# Patient Record
Sex: MALE | Race: WHITE | NOT HISPANIC OR LATINO | URBAN - METROPOLITAN AREA
[De-identification: names, ages, dates, MRNs, and addresses within clinical notes are randomized per-mention and may not be internally consistent; named-entity substitution may affect disease eponyms.]

---

## 2024-07-07 ENCOUNTER — INPATIENT (INPATIENT)
Facility: HOSPITAL | Age: 64
LOS: 8 days | Discharge: HOME | End: 2024-07-16
Attending: INTERNAL MEDICINE | Admitting: INTERNAL MEDICINE
Payer: MEDICAID

## 2024-07-07 VITALS
TEMPERATURE: 98 F | HEART RATE: 60 BPM | WEIGHT: 164.91 LBS | SYSTOLIC BLOOD PRESSURE: 150 MMHG | OXYGEN SATURATION: 100 % | HEIGHT: 76 IN | RESPIRATION RATE: 16 BRPM | DIASTOLIC BLOOD PRESSURE: 86 MMHG

## 2024-07-07 DIAGNOSIS — E87.5 HYPERKALEMIA: ICD-10-CM

## 2024-07-07 DIAGNOSIS — Z29.9 ENCOUNTER FOR PROPHYLACTIC MEASURES, UNSPECIFIED: ICD-10-CM

## 2024-07-07 DIAGNOSIS — N13.8 OTHER OBSTRUCTIVE AND REFLUX UROPATHY: ICD-10-CM

## 2024-07-07 DIAGNOSIS — J32.9 CHRONIC SINUSITIS, UNSPECIFIED: ICD-10-CM

## 2024-07-07 DIAGNOSIS — N17.9 ACUTE KIDNEY FAILURE, UNSPECIFIED: ICD-10-CM

## 2024-07-07 DIAGNOSIS — R51.9 HEADACHE, UNSPECIFIED: ICD-10-CM

## 2024-07-07 DIAGNOSIS — R03.0 ELEVATED BLOOD-PRESSURE READING, WITHOUT DIAGNOSIS OF HYPERTENSION: ICD-10-CM

## 2024-07-07 LAB
ALBUMIN SERPL ELPH-MCNC: 4.7 G/DL — SIGNIFICANT CHANGE UP (ref 3.3–5)
ALP SERPL-CCNC: 56 U/L — SIGNIFICANT CHANGE UP (ref 40–120)
ALT FLD-CCNC: 7 U/L — SIGNIFICANT CHANGE UP (ref 4–41)
ANION GAP SERPL CALC-SCNC: 16 MMOL/L — HIGH (ref 7–14)
ANION GAP SERPL CALC-SCNC: 18 MMOL/L — HIGH (ref 7–14)
ANION GAP SERPL CALC-SCNC: 18 MMOL/L — HIGH (ref 7–14)
APPEARANCE UR: CLEAR — SIGNIFICANT CHANGE UP
AST SERPL-CCNC: <5 U/L — LOW (ref 4–40)
BACTERIA # UR AUTO: NEGATIVE /HPF — SIGNIFICANT CHANGE UP
BASOPHILS # BLD AUTO: 0.01 K/UL — SIGNIFICANT CHANGE UP (ref 0–0.2)
BASOPHILS NFR BLD AUTO: 0.2 % — SIGNIFICANT CHANGE UP (ref 0–2)
BILIRUB SERPL-MCNC: 0.3 MG/DL — SIGNIFICANT CHANGE UP (ref 0.2–1.2)
BILIRUB UR-MCNC: NEGATIVE — SIGNIFICANT CHANGE UP
BLOOD GAS VENOUS COMPREHENSIVE RESULT: SIGNIFICANT CHANGE UP
BUN SERPL-MCNC: 74 MG/DL — HIGH (ref 7–23)
BUN SERPL-MCNC: 76 MG/DL — HIGH (ref 7–23)
BUN SERPL-MCNC: 79 MG/DL — HIGH (ref 7–23)
CALCIUM SERPL-MCNC: 8.9 MG/DL — SIGNIFICANT CHANGE UP (ref 8.4–10.5)
CALCIUM SERPL-MCNC: 9 MG/DL — SIGNIFICANT CHANGE UP (ref 8.4–10.5)
CALCIUM SERPL-MCNC: 9.1 MG/DL — SIGNIFICANT CHANGE UP (ref 8.4–10.5)
CAST: 0 /LPF — SIGNIFICANT CHANGE UP (ref 0–4)
CHLORIDE SERPL-SCNC: 106 MMOL/L — SIGNIFICANT CHANGE UP (ref 98–107)
CHLORIDE SERPL-SCNC: 106 MMOL/L — SIGNIFICANT CHANGE UP (ref 98–107)
CHLORIDE SERPL-SCNC: 107 MMOL/L — SIGNIFICANT CHANGE UP (ref 98–107)
CO2 SERPL-SCNC: 14 MMOL/L — LOW (ref 22–31)
CO2 SERPL-SCNC: 15 MMOL/L — LOW (ref 22–31)
CO2 SERPL-SCNC: 17 MMOL/L — LOW (ref 22–31)
COLOR SPEC: YELLOW — SIGNIFICANT CHANGE UP
CREAT SERPL-MCNC: 4.94 MG/DL — HIGH (ref 0.5–1.3)
CREAT SERPL-MCNC: 5.14 MG/DL — HIGH (ref 0.5–1.3)
CREAT SERPL-MCNC: 5.25 MG/DL — HIGH (ref 0.5–1.3)
DIFF PNL FLD: NEGATIVE — SIGNIFICANT CHANGE UP
EGFR: 11 ML/MIN/1.73M2 — LOW
EGFR: 12 ML/MIN/1.73M2 — LOW
EGFR: 12 ML/MIN/1.73M2 — LOW
EOSINOPHIL # BLD AUTO: 0.11 K/UL — SIGNIFICANT CHANGE UP (ref 0–0.5)
EOSINOPHIL NFR BLD AUTO: 2.1 % — SIGNIFICANT CHANGE UP (ref 0–6)
GLUCOSE SERPL-MCNC: 107 MG/DL — HIGH (ref 70–99)
GLUCOSE SERPL-MCNC: 78 MG/DL — SIGNIFICANT CHANGE UP (ref 70–99)
GLUCOSE SERPL-MCNC: 83 MG/DL — SIGNIFICANT CHANGE UP (ref 70–99)
GLUCOSE UR QL: NEGATIVE MG/DL — SIGNIFICANT CHANGE UP
HCT VFR BLD CALC: 28.6 % — LOW (ref 39–50)
HGB BLD-MCNC: 9.1 G/DL — LOW (ref 13–17)
IANC: 3.68 K/UL — SIGNIFICANT CHANGE UP (ref 1.8–7.4)
IMM GRANULOCYTES NFR BLD AUTO: 0.4 % — SIGNIFICANT CHANGE UP (ref 0–0.9)
KETONES UR-MCNC: NEGATIVE MG/DL — SIGNIFICANT CHANGE UP
LEUKOCYTE ESTERASE UR-ACNC: ABNORMAL
LYMPHOCYTES # BLD AUTO: 0.93 K/UL — LOW (ref 1–3.3)
LYMPHOCYTES # BLD AUTO: 17.9 % — SIGNIFICANT CHANGE UP (ref 13–44)
MCHC RBC-ENTMCNC: 31 PG — SIGNIFICANT CHANGE UP (ref 27–34)
MCHC RBC-ENTMCNC: 31.8 GM/DL — LOW (ref 32–36)
MCV RBC AUTO: 97.3 FL — SIGNIFICANT CHANGE UP (ref 80–100)
MONOCYTES # BLD AUTO: 0.46 K/UL — SIGNIFICANT CHANGE UP (ref 0–0.9)
MONOCYTES NFR BLD AUTO: 8.8 % — SIGNIFICANT CHANGE UP (ref 2–14)
NEUTROPHILS # BLD AUTO: 3.68 K/UL — SIGNIFICANT CHANGE UP (ref 1.8–7.4)
NEUTROPHILS NFR BLD AUTO: 70.6 % — SIGNIFICANT CHANGE UP (ref 43–77)
NITRITE UR-MCNC: NEGATIVE — SIGNIFICANT CHANGE UP
NRBC # BLD: 0 /100 WBCS — SIGNIFICANT CHANGE UP (ref 0–0)
NRBC # FLD: 0.02 K/UL — HIGH (ref 0–0)
PH UR: 6 — SIGNIFICANT CHANGE UP (ref 5–8)
PLATELET # BLD AUTO: 152 K/UL — SIGNIFICANT CHANGE UP (ref 150–400)
POTASSIUM SERPL-MCNC: 5.5 MMOL/L — HIGH (ref 3.5–5.3)
POTASSIUM SERPL-MCNC: 5.5 MMOL/L — HIGH (ref 3.5–5.3)
POTASSIUM SERPL-MCNC: 6.2 MMOL/L — CRITICAL HIGH (ref 3.5–5.3)
POTASSIUM SERPL-SCNC: 5.5 MMOL/L — HIGH (ref 3.5–5.3)
POTASSIUM SERPL-SCNC: 5.5 MMOL/L — HIGH (ref 3.5–5.3)
POTASSIUM SERPL-SCNC: 6.2 MMOL/L — CRITICAL HIGH (ref 3.5–5.3)
PROT SERPL-MCNC: 7.5 G/DL — SIGNIFICANT CHANGE UP (ref 6–8.3)
PROT UR-MCNC: 100 MG/DL
RBC # BLD: 2.94 M/UL — LOW (ref 4.2–5.8)
RBC # FLD: 13.6 % — SIGNIFICANT CHANGE UP (ref 10.3–14.5)
RBC CASTS # UR COMP ASSIST: 0 /HPF — SIGNIFICANT CHANGE UP (ref 0–4)
SODIUM SERPL-SCNC: 137 MMOL/L — SIGNIFICANT CHANGE UP (ref 135–145)
SODIUM SERPL-SCNC: 139 MMOL/L — SIGNIFICANT CHANGE UP (ref 135–145)
SODIUM SERPL-SCNC: 141 MMOL/L — SIGNIFICANT CHANGE UP (ref 135–145)
SP GR SPEC: 1.01 — SIGNIFICANT CHANGE UP (ref 1–1.03)
SQUAMOUS # UR AUTO: 0 /HPF — SIGNIFICANT CHANGE UP (ref 0–5)
UROBILINOGEN FLD QL: 0.2 MG/DL — SIGNIFICANT CHANGE UP (ref 0.2–1)
WBC # BLD: 5.21 K/UL — SIGNIFICANT CHANGE UP (ref 3.8–10.5)
WBC # FLD AUTO: 5.21 K/UL — SIGNIFICANT CHANGE UP (ref 3.8–10.5)
WBC UR QL: 5 /HPF — SIGNIFICANT CHANGE UP (ref 0–5)

## 2024-07-07 PROCEDURE — 99223 1ST HOSP IP/OBS HIGH 75: CPT

## 2024-07-07 PROCEDURE — 99291 CRITICAL CARE FIRST HOUR: CPT

## 2024-07-07 PROCEDURE — 71250 CT THORAX DX C-: CPT | Mod: 26,MC

## 2024-07-07 PROCEDURE — 99255 IP/OBS CONSLTJ NEW/EST HI 80: CPT

## 2024-07-07 PROCEDURE — 70450 CT HEAD/BRAIN W/O DYE: CPT | Mod: 26,MC

## 2024-07-07 PROCEDURE — 74176 CT ABD & PELVIS W/O CONTRAST: CPT | Mod: 26,MC

## 2024-07-07 RX ORDER — TAMSULOSIN HYDROCHLORIDE 0.4 MG/1
0.4 CAPSULE ORAL AT BEDTIME
Refills: 0 | Status: DISCONTINUED | OUTPATIENT
Start: 2024-07-07 | End: 2024-07-16

## 2024-07-07 RX ORDER — ACETAMINOPHEN 325 MG
650 TABLET ORAL EVERY 6 HOURS
Refills: 0 | Status: DISCONTINUED | OUTPATIENT
Start: 2024-07-07 | End: 2024-07-16

## 2024-07-07 RX ORDER — LIDOCAINE HYDROCHLORIDE 20 MG/ML
5 INJECTION, SOLUTION EPIDURAL; INFILTRATION; INTRACAUDAL; PERINEURAL ONCE
Refills: 0 | Status: COMPLETED | OUTPATIENT
Start: 2024-07-07 | End: 2024-07-07

## 2024-07-07 RX ORDER — SODIUM CHLORIDE 0.9 % (FLUSH) 0.9 %
1500 SYRINGE (ML) INJECTION ONCE
Refills: 0 | Status: COMPLETED | OUTPATIENT
Start: 2024-07-07 | End: 2024-07-07

## 2024-07-07 RX ORDER — DEXTROSE MONOHYDRATE AND SODIUM CHLORIDE 5; .3 G/100ML; G/100ML
1000 INJECTION, SOLUTION INTRAVENOUS
Refills: 0 | Status: DISCONTINUED | OUTPATIENT
Start: 2024-07-07 | End: 2024-07-08

## 2024-07-07 RX ORDER — SODIUM CHLORIDE 0.65 %
1 AEROSOL, SPRAY (ML) NASAL
Refills: 0 | Status: DISCONTINUED | OUTPATIENT
Start: 2024-07-07 | End: 2024-07-16

## 2024-07-07 RX ORDER — SODIUM ZIRCONIUM CYCLOSILICATE 10 G/10G
10 POWDER, FOR SUSPENSION ORAL ONCE
Refills: 0 | Status: COMPLETED | OUTPATIENT
Start: 2024-07-07 | End: 2024-07-07

## 2024-07-07 RX ORDER — MAGNESIUM, ALUMINUM HYDROXIDE 400-400
30 TABLET,CHEWABLE ORAL EVERY 4 HOURS
Refills: 0 | Status: DISCONTINUED | OUTPATIENT
Start: 2024-07-07 | End: 2024-07-16

## 2024-07-07 RX ORDER — ONDANSETRON HYDROCHLORIDE 2 MG/ML
4 INJECTION INTRAMUSCULAR; INTRAVENOUS EVERY 8 HOURS
Refills: 0 | Status: DISCONTINUED | OUTPATIENT
Start: 2024-07-07 | End: 2024-07-16

## 2024-07-07 RX ORDER — CALCIUM GLUCONATE 98 MG/ML
1 INJECTION, SOLUTION INTRAVENOUS ONCE
Refills: 0 | Status: COMPLETED | OUTPATIENT
Start: 2024-07-07 | End: 2024-07-07

## 2024-07-07 RX ADMIN — SODIUM ZIRCONIUM CYCLOSILICATE 10 GRAM(S): 10 POWDER, FOR SUSPENSION ORAL at 11:23

## 2024-07-07 RX ADMIN — LIDOCAINE HYDROCHLORIDE 5 MILLILITER(S): 20 INJECTION, SOLUTION EPIDURAL; INFILTRATION; INTRACAUDAL; PERINEURAL at 09:57

## 2024-07-07 RX ADMIN — SODIUM ZIRCONIUM CYCLOSILICATE 10 GRAM(S): 10 POWDER, FOR SUSPENSION ORAL at 21:57

## 2024-07-07 RX ADMIN — Medication 1500 MILLILITER(S): at 11:33

## 2024-07-07 RX ADMIN — LIDOCAINE HYDROCHLORIDE 5 MILLILITER(S): 20 INJECTION, SOLUTION EPIDURAL; INFILTRATION; INTRACAUDAL; PERINEURAL at 13:32

## 2024-07-07 RX ADMIN — TAMSULOSIN HYDROCHLORIDE 0.4 MILLIGRAM(S): 0.4 CAPSULE ORAL at 22:02

## 2024-07-07 RX ADMIN — CALCIUM GLUCONATE 100 GRAM(S): 98 INJECTION, SOLUTION INTRAVENOUS at 11:23

## 2024-07-07 RX ADMIN — DEXTROSE MONOHYDRATE AND SODIUM CHLORIDE 75 MILLILITER(S): 5; .3 INJECTION, SOLUTION INTRAVENOUS at 23:07

## 2024-07-07 NOTE — H&P ADULT - PROBLEM SELECTOR PLAN 2
-pt noted with likely chronic b/l hydronephrosis and high PVR  -2/2 enlarged prostate and possible bladder mass  -silva placed however as per CT scan tip of the Silva catheter projects beyond the left posterolateral bladder wall margin, possibly within a collapsed diverticulum or external to the bladder  -appreciate urology recs. Monitor UOP. Started on IVF for post obstructive diuresis. Monitor urine color, hand irrigate as needed  -CT cystogram to assess for bladder integrity, r/o perforation   -will need outpt cysto to investigate bladder for mucosal anomalies and eval prostate  -start on flomax and proscar   -will likely need to continue silva on d/c as per urology   - Maintain strict intake and output   - Repeat BMP q6 hours and replete electrolytes as needed until UOP normalizes  -Minimize medications with anticholinergic, antihistaminergic, opioid and benzodiazapine properties at tolerated, maximize mobility and minimized constipation with good bowel regimen.

## 2024-07-07 NOTE — CONSULT NOTE ADULT - NS ATTEST RISK PROBLEM GEN_ALL_CORE FT
urinary retention with renal dysfunction and potential long term consequent bladder/ renal compromise

## 2024-07-07 NOTE — ED PROVIDER NOTE - OBJECTIVE STATEMENT
Patient is a 63-year-old male with no pertinent past medical history presents with urinary incontinence x 2 months.  Patient states he has had episodes of nocturnal urinary incontinence when falling asleep intermittently for the past 2 months.  Patient also reports intermittent mild discomfort at left lower quadrant.  He states 1 month ago, he had ultrasound performed which showed "a lobulated solid mass in the posterior wall of the urinary bladder...raising possibility of a neoplastic polyp.  Urinary bladder is markedly dilated with irregular wall thickening likely due to chronic cystitis.  PVR large.  Severe bilateral hydronephrosis.  Severe bilateral hydroureter.  Prostate is not grossly enlarged.  Urinary bladder obstruction considered."  Patient also brings copies of lab work from 6/7/2024 showing a BUN of 62 and a creatinine of 3.85.  Patient also reports intermittent episodes of difficulty starting urinary stream.  Patient also reports mild generalized headaches described as discomfort for the past 2 to 3 months.  He states no active headache at this time.  Patient denies any fevers, chills, changes in vision or hearing, numbness, weakness, dizziness, chest pain, shortness of breath, dysuria, cloudy urine, hematuria, flank pain, testicular pain.

## 2024-07-07 NOTE — H&P ADULT - PROBLEM SELECTOR PLAN 4
-noted with SBP 180s, unclear if related to urinary retention or pain however pt also with subconjunctival hemorrhage of left eye  -monitor BP closely and will need to start medications if continues to be elevated

## 2024-07-07 NOTE — ED ADULT NURSE NOTE - NSFALLUNIVINTERV_ED_ALL_ED
Bed/Stretcher in lowest position, wheels locked, appropriate side rails in place/Call bell, personal items and telephone in reach/Instruct patient to call for assistance before getting out of bed/chair/stretcher/Non-slip footwear applied when patient is off stretcher/[place] to call system/Physically safe environment - no spills, clutter or unnecessary equipment/Purposeful proactive rounding/Room/bathroom lighting operational, light cord in reach

## 2024-07-07 NOTE — H&P ADULT - ASSESSMENT
63-year-old male with no pertinent past medical history presents with urinary incontinence x 2 months and poor urinary stream w/ suprapubic pain found w/ YG on suspected CKD 2/2 obstructive nephropathy 2/2 suspected bladder mass

## 2024-07-07 NOTE — ED PROVIDER NOTE - CRITICAL CARE ATTENDING CONTRIBUTION TO CARE
Patient is a 63-year-old male with no pertinent past medical history presents with urinary incontinence x 2 months.  Patient states he has had episodes of nocturnal urinary incontinence when falling asleep intermittently for the past 2 months.  Patient also reports intermittent mild discomfort at left lower quadrant.  He states 1 month ago, he had ultrasound performed which showed "a lobulated solid mass in the posterior wall of the urinary bladder...raising possibility of a neoplastic polyp.  Urinary bladder is markedly dilated with irregular wall thickening likely due to chronic cystitis.  PVR large.  Severe bilateral hydronephrosis.  Severe bilateral hydroureter.  Prostate is not grossly enlarged.  Urinary bladder obstruction considered."  Patient also brings copies of lab work from 6/7/2024 showing a BUN of 62 and a creatinine of 3.85.  Patient also reports intermittent episodes of difficulty starting urinary stream.  Patient also reports mild generalized headaches described as discomfort for the past 2 to 3 months.  He states no active headache at this time.  Patient denies any fevers, chills, changes in vision or hearing, numbness, weakness, dizziness, chest pain, shortness of breath, dysuria, cloudy urine, hematuria, flank pain, testicular pain.    I performed a face to face evaluation of this patient and obtained a history and performed a full exam.  I agree with the history, physical exam and plan of the PA.   pt with elevated K, requiring intervention including Ca  for mild hyperacute t's. pt well appearing otherwise, hd stable.

## 2024-07-07 NOTE — H&P ADULT - PROBLEM SELECTOR PLAN 1
-Pt p/w Cr 5.25. Cr one month ago was 3.85. Also noted with hyperkalemia and metabolic acidosis 2/2 YG. In the setting of b/l hydronephrosis and distended bladder c/f obstructive process  -s/p silva placement in ED with 5.6L outpt since presentation. Bloody urine but no clots noted  -start on IVF given post obstructive diuresis, encourage pt to drink to thirst   -treat mild hyperkalemia w/ lokelma  -trend BMP q6h for now. F/u repeat VBG  -monitor Is and Os -Pt p/w Cr 5.25. Cr one month ago was 3.85. Also noted with hyperkalemia and metabolic acidosis 2/2 YG. In the setting of b/l hydronephrosis and distended bladder c/f obstructive process  -s/p silva placement in ED with 5.6L outpt since presentation. Bloody urine but no clots noted. Hematuria thought to be 2/2 decompression  -start on IVF given post obstructive diuresis, encourage pt to drink to thirst   -treat mild hyperkalemia w/ lokelma  -trend BMP q6h for now. F/u repeat VBG  -monitor Is and Os

## 2024-07-07 NOTE — CONSULT NOTE ADULT - ATTENDING COMMENTS
NAD   ncat abd nt soft    silva in place cranberry urine.    pt with high pvr, YG, cath placed with 3L drainage maddison chronic appearing hydro.    post cath ct with read suggesting possible extravesical location of cath tip.  suspect in fold of thinned decompressed bladder.  CT cystogram to assess for bladder integrity     will need outpt cysto to investigate bladder for mucosal anomalies and eval prostate.  uroselective alpha blocker if there are no contraindications. Minimize medications with anticholinergic, antihistaminergic, opioid and benzodiazapine properties at tolerated, maximize mobility and minimized constipation with good bowel regimen.    cont cath at discharge  anticipate decompression hematuria that should resolve over next few days may hand irrigate if needed.

## 2024-07-07 NOTE — H&P ADULT - HISTORY OF PRESENT ILLNESS
63-year-old male with no pertinent past medical history presents with urinary incontinence x 2 months and poor urinary stream w/ suprapubic pain. Patient states he has had episodes of nocturnal urinary incontinence when falling asleep intermittently for the past 2 months.  Patient also reports intermittent mild discomfort at left lower quadrant/suprapubic area.  He states 1 month ago, he had ultrasound performed which showed "a lobulated solid mass in the posterior wall of the urinary bladder...raising possibility of a neoplastic polyp.  Urinary bladder is markedly dilated with irregular wall thickening likely due to chronic cystitis.  PVR large.  Severe bilateral hydronephrosis.  Severe bilateral hydroureter.  Prostate is not grossly enlarged.  Urinary bladder obstruction considered."  Patient also brings copies of lab work from 6/7/2024 showing a BUN of 62 and a creatinine of 3.85.  Patient also reports intermittent episodes of difficulty starting urinary stream, has small volume urine output.  Patient also notes that over the last few weeks had had mild generalized headaches described as discomfort without associated visual changes or focal weakness. He also notes feeling malaise and nauseous, poor appetite   He states after placement of silva, he has resolution of headache and generalized malaise. Still noting some LLQ discomfort. Patient denies any fevers, chills, changes in vision or hearing, numbness, focal weakness, dizziness, chest pain, shortness of breath, dysuria, cloudy urine, hematuria, flank pain, testicular pain. He denies any facial pain, purulent nasal discharge or nasal congestion.  Notes blood shot left eye for the last few days, denies trauma, denies any change in visual acuity or eye pain. States he was evaluated by an opthalmologist in the ER and was told everything looked fine.

## 2024-07-07 NOTE — H&P ADULT - NSHPPHYSICALEXAM_GEN_ALL_CORE
GENERAL APPEARANCE: Well developed, alert and cooperative. NAD.   HEENT:  PERRL, EOMI. Subconjunctival hemorrhage of left eye. External auditory canals normal, hearing grossly intact. No facial pain on palpation.   NECK: Neck supple, non-tender   CARDIAC: Normal S1 and S2. No S3, S4 or murmurs. Rhythm is regular.  LUNGS: Clear to auscultation and percussion without rales, rhonchi, wheezing or diminished breath sounds.  ABDOMEN: Positive bowel sounds. Soft, nondistended, mild LLQ tenderness. No guarding or rebound.   : silva in place draining cranberry red urine  MUSCULOSKELETAL: ROM intact spine and extremities. No joint erythema or tenderness.   BACK: normal posture, no spinal deformity, symmetry of spinal muscles, without tenderness, decreased range of motion.  EXTREMITIES: No significant deformity or joint abnormality. No edema. Peripheral pulses intact.   NEUROLOGICAL: CN II-XII intact. Strength and sensation symmetric and intact throughout.   SKIN: Skin normal color, texture and turgor with no lesions or eruptions.  PSYCHIATRIC: AOx3.Normal affect and behavior.

## 2024-07-07 NOTE — H&P ADULT - PROBLEM SELECTOR PLAN 5
-incidential finding on CT head of: near-complete opacification of left sphenoid sinus with internal increased density may relate to fungal type infection/chronic sinusitis with obstruction of the sphenoethmoidal recess  -unlikely cause for headache  -pt without facial pain, nasal congestion, purulent drainage or reduced sense of smell. Low c/f acute sinusitis   -discussed with pt and can provide with ENT referral upon d/c  -saline nasal spray as needed

## 2024-07-07 NOTE — CONSULT NOTE ADULT - ASSESSMENT
Urology Consult    Patient is a 63-year-old male with no pertinent past medical history presents with urinary incontinence x 2 months.  Patient states he has had episodes of nocturnal urinary incontinence when falling asleep intermittently for the past 2 months.  Patient also reports intermittent mild discomfort at left lower quadrant.  He states 1 month ago, he had ultrasound performed which showed "a lobulated solid mass in the posterior wall of the urinary bladder...raising possibility of a neoplastic polyp.  Urinary bladder is markedly dilated with irregular wall thickening likely due to chronic cystitis.  PVR large.  Severe bilateral hydronephrosis.  Severe bilateral hydroureter.  Prostate is not grossly enlarged.  Urinary bladder obstruction considered."  Patient also brings copies of lab work from 6/7/2024 showing a BUN of 62 and a creatinine of 3.85.  Patient also reports intermittent episodes of difficulty starting urinary stream.  Patient also reports mild generalized headaches described as discomfort for the past 2 to 3 months.  He states no active headache at this time.  Patient denies any fevers, chills, changes in vision or hearing, numbness, weakness, dizziness, chest pain, shortness of breath, dysuria, cloudy urine, hematuria, flank pain, testicular pain.      Urology consulted due to CT findings demonstrating tip of the Silva catheter projects beyond the left posterolateral bladder wall margin, possibly within a collapsed diverticulum or external   to the bladder. Correlate with Silva catheter function. Consider revision of position as clinically warranted. Has experienced intermittent episodes of difficulty urinating and weak stream for the past few weeks. No previous episodes. No history of urological interventions, malignancy. Has not seen urologist in past. During ER course 16 French placed by ED staff w/ 3100 ml output. Found to have Cr 5.25, K+ 6.2, UA unremarkable and PH of 7.26. Denies fever, chills, nausea/vomiting.    ------------------------------------------------------------------------------------------------------  Preliminary Recommendations  - Monitor for post obstructive diuresis (urine output >200/hr for 3 hours)  - Maintain strict intake and output (document UOP in flowsheets ever 4 hours)  - Repeat BMP q6 hours and replete electrolytes as needed until UOP normalizes  - Allow patient to drink to thirst, place 2 liters of water at bedside at all times  - Will require 1/2 NS replacement if continued depletion despite adequate PO intake   - Follow up urine culture   - Continue flomax 0.4mg and proscar 5mg, ensure patient has Rx's on discharge  -Optimize for TOV: encourage ambulation/OOB, bowel regiment, minimize narcotics/benzodiazepines/anticholinergics/antihistamines, stand when voiding if possible, timed voids  - Keep silva for at least 5 days   Urology Consult    Patient is a 63-year-old male with no pertinent past medical history presents with urinary incontinence x 2 months.  Patient states he has had episodes of nocturnal urinary incontinence when falling asleep intermittently for the past 2 months.  Patient also reports intermittent mild discomfort at left lower quadrant.  He states 1 month ago, he had ultrasound performed which showed "a lobulated solid mass in the posterior wall of the urinary bladder...raising possibility of a neoplastic polyp.  Urinary bladder is markedly dilated with irregular wall thickening likely due to chronic cystitis.  PVR large.  Severe bilateral hydronephrosis.  Severe bilateral hydroureter.  Prostate is not grossly enlarged.  Urinary bladder obstruction considered."  Patient also brings copies of lab work from 6/7/2024 showing a BUN of 62 and a creatinine of 3.85.  Patient also reports intermittent episodes of difficulty starting urinary stream.  Patient also reports mild generalized headaches described as discomfort for the past 2 to 3 months.  He states no active headache at this time.  Patient denies any fevers, chills, changes in vision or hearing, numbness, weakness, dizziness, chest pain, shortness of breath, dysuria, cloudy urine, hematuria, flank pain, testicular pain.      Urology consulted due to CT findings demonstrating tip of the Silva catheter projects beyond the left posterolateral bladder wall margin, possibly within a collapsed diverticulum or external   to the bladder. Correlate with Silva catheter function. Consider revision of position as clinically warranted. Has experienced intermittent episodes of difficulty urinating and weak stream for the past few weeks. No previous episodes. No history of urological interventions, malignancy. Has not seen urologist in past. During ER course 16 French placed by ED staff w/ 3100 ml output. Found to have Cr 5.25, K+ 6.2, UA unremarkable and PH of 7.26. Denies fever, chills, nausea/vomiting.    ------------------------------------------------------------------------------------------------------   Recommendations  - No acute urologic interventions indicated at this time  - Obtain CT cystogram to r/o bladder perforation  - Minimize opioids anticholinergics and antihistamines as tolerated     - Monitor for post obstructive diuresis (urine output >200/hr for 3 hours)  - Maintain strict intake and output (document UOP in flowsheets ever 4 hours)  - Repeat BMP q6 hours and replete electrolytes as needed until UOP normalizes  - Allow patient to drink to thirst, place 2 liters of water at bedside at all times  - Will require 1/2 NS replacement if continued depletion despite adequate PO intake   - Follow up urine culture   - Continue flomax 0.4mg and proscar 5mg, ensure patient has Rx's on discharge  -Optimize for TOV: encourage ambulation/OOB, bowel regiment, minimize narcotics/benzodiazepines/anticholinergics/antihistamines, stand when voiding if possible, timed voids  - Keep silva for at least 5 days    Plan discussed with Dr. Bianchi   Urology Consult    Patient is a 63-year-old male with no pertinent past medical history presents with urinary incontinence x 2 months.  Patient states he has had episodes of nocturnal urinary incontinence when falling asleep intermittently for the past 2 months.  Patient also reports intermittent mild discomfort at left lower quadrant.  He states 1 month ago, he had ultrasound performed which showed "a lobulated solid mass in the posterior wall of the urinary bladder...raising possibility of a neoplastic polyp.  Urinary bladder is markedly dilated with irregular wall thickening likely due to chronic cystitis.  PVR large.  Severe bilateral hydronephrosis.  Severe bilateral hydroureter.  Prostate is not grossly enlarged.  Urinary bladder obstruction considered."  Patient also brings copies of lab work from 6/7/2024 showing a BUN of 62 and a creatinine of 3.85.  Patient also reports intermittent episodes of difficulty starting urinary stream.  Patient also reports mild generalized headaches described as discomfort for the past 2 to 3 months.  He states no active headache at this time.  Patient denies any fevers, chills, changes in vision or hearing, numbness, weakness, dizziness, chest pain, shortness of breath, dysuria, cloudy urine, hematuria, flank pain, testicular pain.      Urology consulted due to CT findings demonstrating tip of the Silva catheter projects beyond the left posterolateral bladder wall margin, possibly within a collapsed diverticulum or external   to the bladder. Correlate with Silva catheter function. Consider revision of position as clinically warranted. Has experienced intermittent episodes of difficulty urinating and weak stream for the past few weeks. No previous episodes. No history of urological interventions, malignancy. Has not seen urologist in past. During ER course 16 French placed by ED staff w/ 3100 ml output. Found to have Cr 5.25, K+ 6.2, UA unremarkable and PH of 7.26. Denies fever, chills, nausea/vomiting.    ------------------------------------------------------------------------------------------------------   Recommendations  - No acute urologic interventions indicated at this time  - Obtain CT cystogram to r/o bladder perforation  - Minimize opioids anticholinergics and antihistamines as tolerated     - Monitor for post obstructive diuresis (urine output >200/hr for 3 hours)  - Maintain strict intake and output (document UOP in flowsheets ever 4 hours)  - Repeat BMP q6 hours and replete electrolytes as needed until UOP normalizes  - Allow patient to drink to thirst, place 2 liters of water at bedside at all times  - Will require 1/2 NS replacement if continued depletion despite adequate PO intake   - Follow up urine culture   - Continue flomax 0.4mg and proscar 5mg, ensure patient has Rx's on discharge  -Optimize for TOV: encourage ambulation/OOB, bowel regiment, minimize narcotics/benzodiazepines/anticholinergics/antihistamines, stand when voiding if possible, timed voids  - Keep silva for at least 5 days    -f/u outpatient for evaluation of bladder mass    Plan discussed with Dr. Tash Kapadia [facility]  [address] [place], [place] [id number] [phone]

## 2024-07-07 NOTE — ED PROVIDER NOTE - CLINICAL SUMMARY MEDICAL DECISION MAKING FREE TEXT BOX
Patient is a 63-year-old male with no pertinent past medical history presents with urinary incontinence x 2 months.  Patient states he has had episodes of nocturnal urinary incontinence when falling asleep intermittently for the past 2 months.  Patient also reports intermittent mild discomfort at left lower quadrant.  He states 1 month ago, he had ultrasound performed which showed "a lobulated solid mass in the posterior wall of the urinary bladder...raising possibility of a neoplastic polyp.  Urinary bladder is markedly dilated with irregular wall thickening likely due to chronic cystitis.  PVR large.  Severe bilateral hydronephrosis.  Severe bilateral hydroureter.  Prostate is not grossly enlarged.  Urinary bladder obstruction considered."  Patient also brings copies of lab work from 6/7/2024 showing a BUN of 62 and a creatinine of 3.85.  Patient also reports intermittent episodes of difficulty starting urinary stream.  Patient also reports mild generalized headaches described as discomfort for the past 2 to 3 months.  He states no active headache at this time.  Patient denies any fevers, chills, changes in vision or hearing, numbness, weakness, dizziness, chest pain, shortness of breath, dysuria, cloudy urine, hematuria, flank pain, testicular pain.  This is a patient with a bladder mass, hydronephrosis and elevated creatinine as seen in an outpatient workup through his primary physician.  Plan to order labs, CT head, CT chest, CT abdomen pelvis, urinalysis.  Disposition pending workup.

## 2024-07-07 NOTE — ED ADULT NURSE REASSESSMENT NOTE - NS ED NURSE REASSESS COMMENT FT1
BREAK RN: pt. remains A&Ox4, awake and resting. pt. offers no new complaints at this time. no acute distress noted. respirations even and unlabored. VS as noted. MD Castro aware of BP.

## 2024-07-07 NOTE — H&P ADULT - NSHPREVIEWOFSYSTEMS_GEN_ALL_CORE
CONSTITUTIONAL:  +fatigue No weight loss, fever, chills  HEENT:  Eyes:  No visual loss, blurred vision, double vision or yellow sclerae. Ears, Nose, Throat:  No hearing loss, sneezing, congestion, runny nose or sore throat.  SKIN:  No rash or itching.  CARDIOVASCULAR:  No chest pain, chest pressure or chest discomfort. No palpitations or edema.  RESPIRATORY:  No shortness of breath, cough or sputum.  GASTROINTESTINAL:  +anorexia +nausea +abd pain No vomiting or diarrhea. No blood.  GENITOURINARY:  +hematuria after silva placement Denies dysuria.   NEUROLOGICAL: +headache No dizziness, syncope, paralysis, ataxia, numbness or tingling in the extremities. No change in bowel or bladder control.  MUSCULOSKELETAL:  No muscle, back pain, joint pain or stiffness.  PSYCHIATRIC:  No history of depression or anxiety.  ENDOCRINOLOGIC:  No reports of sweating, cold or heat intolerance. No polyuria or polydipsia.  ALLERGIES:  No history of asthma, hives, eczema or rhinitis.

## 2024-07-07 NOTE — ED PROVIDER NOTE - PROGRESS NOTE DETAILS
THAO WOODS:   mL.  Indwelling urinary catheter ordered.  Dr. Castro made aware. THAO WOODS:  Pt accepted for admission under Dr. Hussein.

## 2024-07-07 NOTE — ED ADULT TRIAGE NOTE - CHIEF COMPLAINT QUOTE
"I have a kidney issue." Pt refuses to elaborate, states wants to speak independently to one doctor so he doesn't have to repeat himself.

## 2024-07-07 NOTE — H&P ADULT - NSHPLABSRESULTS_GEN_ALL_CORE
(07-07 @ 09:15)                      9.1  5.21 )-----------( 152                 28.6    Neutrophils = 3.68 (70.6%)  Lymphocytes = 0.93 (17.9%)  Eosinophils = 0.11 (2.1%)  Basophils = 0.01 (0.2%)  Monocytes = 0.46 (8.8%)  Bands = --%    07-07    141  |  106  |  76<H>  ----------------------------<  107<H>  5.5<H>   |  17<L>  |  5.14<H>    Ca    8.9      07 Jul 2024 18:45    TPro  7.5  /  Alb  4.7  /  TBili  0.3  /  DBili  x   /  AST  <5<L>  /  ALT  7   /  AlkPhos  56  07-07          RVP:    Venous Blood Gas:  07-07 @ 10:25  7.26/37/26/17/39.2  VBG Lactate: 0.8        Urinalysis Basic - ( 07 Jul 2024 18:45 )    Color: x / Appearance: x / SG: x / pH: x  Gluc: 107 mg/dL / Ketone: x  / Bili: x / Urobili: x   Blood: x / Protein: x / Nitrite: x   Leuk Esterase: x / RBC: x / WBC x   Sq Epi: x / Non Sq Epi: x / Bacteria: x      < from: CT Chest No Cont (07.07.24 @ 10:09) >    IMPRESSION:    Limited noncontrast study.    CHEST:  -No pneumonia. No clear evidence of intrathoracic malignancy.  -Aortic valve calcification and multivessel coronary artery   calcifications.    ABDOMEN/PELVIS:  -Severe bilateral hydroureteronephrosis. Left renal collecting system   appears duplicated. Bilateral ureteral tortuosity is noted, difficult to   evaluate due to lack of contrast. No clear evidence of obstructing   ureteral calculus.  -Marked lobulated bladder wall thickening despite underdistention   secondary to Pratt catheter. Enlarged prostate with median lobe   hypertrophy. Presence or absence of bladder mass is not adequately   evaluated on this study. Correlate with urinalysis, urine cytology, PSA,   urology evaluation.  -Tip of the Pratt catheter projects beyond the left posterolateral   bladder wall margin, possibly within a collapsed diverticulum or external   to the bladder. Correlate with Pratt catheter function. Consider revision   of position as clinically warranted. This was discussed with Dr. Celestino Alvarado on 7/7/2024 at 11:24AM. CT cystogram can be obtained as clinically   required.  -Left para-aortic density measures 2.3 x 1.4 cm, image 95 series 301,   possibly a lymph node or of vascular etiology.  -Trace ascites.    < end of copied text >    < from: CT Head No Cont (07.07.24 @ 10:09) >      IMPRESSION:  No acute intracranial hemorrhage, mass effect, or midline shift.    Near-complete opacification of left sphenoid sinus with internal   increased density may relate to fungal type infection/chronic sinusitis   with obstruction of the sphenoethmoidal recess.    < end of copied text >        Labs personally reviewed  Imaging reviewed  EKG: sinus aditya @ 54, minimal voltage criteria for LVH

## 2024-07-08 ENCOUNTER — TRANSCRIPTION ENCOUNTER (OUTPATIENT)
Age: 64
End: 2024-07-08

## 2024-07-08 LAB
ANION GAP SERPL CALC-SCNC: 15 MMOL/L — HIGH (ref 7–14)
ANION GAP SERPL CALC-SCNC: 16 MMOL/L — HIGH (ref 7–14)
ANION GAP SERPL CALC-SCNC: 19 MMOL/L — HIGH (ref 7–14)
BASE EXCESS BLDV CALC-SCNC: -10 MMOL/L — LOW (ref -2–3)
BLOOD GAS VENOUS COMPREHENSIVE RESULT: SIGNIFICANT CHANGE UP
BUN SERPL-MCNC: 76 MG/DL — HIGH (ref 7–23)
BUN SERPL-MCNC: 83 MG/DL — HIGH (ref 7–23)
BUN SERPL-MCNC: 86 MG/DL — HIGH (ref 7–23)
CALCIUM SERPL-MCNC: 8.4 MG/DL — SIGNIFICANT CHANGE UP (ref 8.4–10.5)
CALCIUM SERPL-MCNC: 8.7 MG/DL — SIGNIFICANT CHANGE UP (ref 8.4–10.5)
CALCIUM SERPL-MCNC: 8.9 MG/DL — SIGNIFICANT CHANGE UP (ref 8.4–10.5)
CHLORIDE BLDV-SCNC: 110 MMOL/L — HIGH (ref 96–108)
CHLORIDE SERPL-SCNC: 106 MMOL/L — SIGNIFICANT CHANGE UP (ref 98–107)
CHLORIDE SERPL-SCNC: 108 MMOL/L — HIGH (ref 98–107)
CHLORIDE SERPL-SCNC: 108 MMOL/L — HIGH (ref 98–107)
CO2 BLDV-SCNC: 17.9 MMOL/L — LOW (ref 22–26)
CO2 SERPL-SCNC: 14 MMOL/L — LOW (ref 22–31)
CO2 SERPL-SCNC: 15 MMOL/L — LOW (ref 22–31)
CO2 SERPL-SCNC: 17 MMOL/L — LOW (ref 22–31)
CREAT SERPL-MCNC: 4.49 MG/DL — HIGH (ref 0.5–1.3)
CREAT SERPL-MCNC: 4.8 MG/DL — HIGH (ref 0.5–1.3)
CREAT SERPL-MCNC: 5.28 MG/DL — HIGH (ref 0.5–1.3)
CULTURE RESULTS: NO GROWTH — SIGNIFICANT CHANGE UP
EGFR: 11 ML/MIN/1.73M2 — LOW
EGFR: 13 ML/MIN/1.73M2 — LOW
EGFR: 14 ML/MIN/1.73M2 — LOW
GAS PNL BLDV: 138 MMOL/L — SIGNIFICANT CHANGE UP (ref 136–145)
GLUCOSE BLDV-MCNC: 89 MG/DL — SIGNIFICANT CHANGE UP (ref 70–99)
GLUCOSE SERPL-MCNC: 104 MG/DL — HIGH (ref 70–99)
GLUCOSE SERPL-MCNC: 123 MG/DL — HIGH (ref 70–99)
GLUCOSE SERPL-MCNC: 95 MG/DL — SIGNIFICANT CHANGE UP (ref 70–99)
HCO3 BLDV-SCNC: 17 MMOL/L — LOW (ref 22–29)
HCT VFR BLD CALC: 30.3 % — LOW (ref 39–50)
HCT VFR BLDA CALC: 31 % — LOW (ref 39–51)
HGB BLD CALC-MCNC: 10.2 G/DL — LOW (ref 12.6–17.4)
HGB BLD-MCNC: 9.8 G/DL — LOW (ref 13–17)
LACTATE BLDV-MCNC: 1.2 MMOL/L — SIGNIFICANT CHANGE UP (ref 0.5–2)
MAGNESIUM SERPL-MCNC: 1.3 MG/DL — LOW (ref 1.6–2.6)
MCHC RBC-ENTMCNC: 30.9 PG — SIGNIFICANT CHANGE UP (ref 27–34)
MCHC RBC-ENTMCNC: 32.3 GM/DL — SIGNIFICANT CHANGE UP (ref 32–36)
MCV RBC AUTO: 95.6 FL — SIGNIFICANT CHANGE UP (ref 80–100)
NRBC # BLD: 0 /100 WBCS — SIGNIFICANT CHANGE UP (ref 0–0)
NRBC # FLD: 0 K/UL — SIGNIFICANT CHANGE UP (ref 0–0)
PCO2 BLDV: 39 MMHG — LOW (ref 42–55)
PH BLDV: 7.24 — LOW (ref 7.32–7.43)
PHOSPHATE SERPL-MCNC: 3.7 MG/DL — SIGNIFICANT CHANGE UP (ref 2.5–4.5)
PLATELET # BLD AUTO: 160 K/UL — SIGNIFICANT CHANGE UP (ref 150–400)
PO2 BLDV: 25 MMHG — SIGNIFICANT CHANGE UP (ref 25–45)
POTASSIUM BLDV-SCNC: 4.9 MMOL/L — SIGNIFICANT CHANGE UP (ref 3.5–5.1)
POTASSIUM SERPL-MCNC: 4.7 MMOL/L — SIGNIFICANT CHANGE UP (ref 3.5–5.3)
POTASSIUM SERPL-MCNC: 5.1 MMOL/L — SIGNIFICANT CHANGE UP (ref 3.5–5.3)
POTASSIUM SERPL-MCNC: 6 MMOL/L — HIGH (ref 3.5–5.3)
POTASSIUM SERPL-SCNC: 4.7 MMOL/L — SIGNIFICANT CHANGE UP (ref 3.5–5.3)
POTASSIUM SERPL-SCNC: 5.1 MMOL/L — SIGNIFICANT CHANGE UP (ref 3.5–5.3)
POTASSIUM SERPL-SCNC: 6 MMOL/L — HIGH (ref 3.5–5.3)
RBC # BLD: 3.17 M/UL — LOW (ref 4.2–5.8)
RBC # FLD: 13.6 % — SIGNIFICANT CHANGE UP (ref 10.3–14.5)
SAO2 % BLDV: 35.3 % — LOW (ref 67–88)
SODIUM SERPL-SCNC: 138 MMOL/L — SIGNIFICANT CHANGE UP (ref 135–145)
SODIUM SERPL-SCNC: 139 MMOL/L — SIGNIFICANT CHANGE UP (ref 135–145)
SODIUM SERPL-SCNC: 141 MMOL/L — SIGNIFICANT CHANGE UP (ref 135–145)
SPECIMEN SOURCE: SIGNIFICANT CHANGE UP
WBC # BLD: 6.93 K/UL — SIGNIFICANT CHANGE UP (ref 3.8–10.5)
WBC # FLD AUTO: 6.93 K/UL — SIGNIFICANT CHANGE UP (ref 3.8–10.5)

## 2024-07-08 PROCEDURE — 99232 SBSQ HOSP IP/OBS MODERATE 35: CPT

## 2024-07-08 RX ORDER — DEXTROSE 30 % IN WATER 30 %
25 VIAL (ML) INTRAVENOUS ONCE
Refills: 0 | Status: COMPLETED | OUTPATIENT
Start: 2024-07-08 | End: 2024-07-08

## 2024-07-08 RX ORDER — ALBUTEROL 90 MCG
10 AEROSOL REFILL (GRAM) INHALATION ONCE
Refills: 0 | Status: COMPLETED | OUTPATIENT
Start: 2024-07-08 | End: 2024-07-08

## 2024-07-08 RX ORDER — SODIUM ZIRCONIUM CYCLOSILICATE 10 G/10G
10 POWDER, FOR SUSPENSION ORAL ONCE
Refills: 0 | Status: COMPLETED | OUTPATIENT
Start: 2024-07-08 | End: 2024-07-08

## 2024-07-08 RX ORDER — CALCIUM GLUCONATE 98 MG/ML
1 INJECTION, SOLUTION INTRAVENOUS ONCE
Refills: 0 | Status: COMPLETED | OUTPATIENT
Start: 2024-07-08 | End: 2024-07-08

## 2024-07-08 RX ORDER — INSULIN REGULAR, HUMAN 100/ML
5 VIAL (ML) INJECTION ONCE
Refills: 0 | Status: COMPLETED | OUTPATIENT
Start: 2024-07-08 | End: 2024-07-08

## 2024-07-08 RX ORDER — SODIUM BICARBONATE 650 MG/1
0.25 TABLET ORAL
Qty: 150 | Refills: 0 | Status: DISCONTINUED | OUTPATIENT
Start: 2024-07-08 | End: 2024-07-10

## 2024-07-08 RX ADMIN — Medication 10 MILLIGRAM(S): at 03:57

## 2024-07-08 RX ADMIN — SODIUM BICARBONATE 125 MEQ/KG/HR: 650 TABLET ORAL at 14:26

## 2024-07-08 RX ADMIN — Medication 25 MILLILITER(S): at 03:36

## 2024-07-08 RX ADMIN — CALCIUM GLUCONATE 100 GRAM(S): 98 INJECTION, SOLUTION INTRAVENOUS at 03:12

## 2024-07-08 RX ADMIN — Medication 5 MILLIGRAM(S): at 11:34

## 2024-07-08 RX ADMIN — SODIUM ZIRCONIUM CYCLOSILICATE 10 GRAM(S): 10 POWDER, FOR SUSPENSION ORAL at 03:12

## 2024-07-08 RX ADMIN — DEXTROSE MONOHYDRATE AND SODIUM CHLORIDE 150 MILLILITER(S): 5; .3 INJECTION, SOLUTION INTRAVENOUS at 04:04

## 2024-07-08 RX ADMIN — Medication 5 UNIT(S): at 03:36

## 2024-07-08 RX ADMIN — SODIUM ZIRCONIUM CYCLOSILICATE 10 GRAM(S): 10 POWDER, FOR SUSPENSION ORAL at 17:52

## 2024-07-08 NOTE — PROGRESS NOTE ADULT - ATTENDING COMMENTS
nad ncat abd nd nt     cr 5.28    AUR with probable unrecognized ongoing chronic component preceding admission  decompresion hematuria improved   uroselective alpha blocker if there are no contraindications. Minimize medications with anticholinergic, antihistaminergic, opioid and benzodiazapine properties at tolerated, maximize mobility and minimized constipation with good bowel regimen.      pending cystogram to confirm bladder intact/without extrav.

## 2024-07-08 NOTE — PROGRESS NOTE ADULT - ASSESSMENT
63-year-old male with no pertinent past medical history presents with urinary incontinence x 2 months. CT findings with bilateral hydronephrosis, left duplicated system and demonstrating tip of the silva catheter projects beyond the left posterolateral bladder wall margin, possibly within a collapsed diverticulum or external to the bladder. Correlate with Silva catheter function. Consider revision of position as clinically warranted. Has experienced intermittent episodes of difficulty urinating and weak stream for the past few weeks. No previous episodes. No history of urological interventions, malignancy. Has not seen urologist in past. During ER course 16 French placed by ED staff w/ 3100 ml output. Found to have Cr 5.25, K+ 6.2, UA unremarkable and PH of 7.26.    Plan:  - Obtain CT cystogram to r/o bladder perforation  - Trend cr, baseline 3.85 one month ago  - Minimize opioids anticholinergics and antihistamines as tolerated  - Monitor for post obstructive diuresis (urine output >200/hr for 3 hours)  - Repeat BMP q6 hours and replete electrolytes as needed until UOP normalizes  - Allow patient to drink to thirst, place 2 liters of water at bedside at all times  - Follow up urine culture   - Continue flomax 0.4mg and proscar 5mg, ensure patient has Rx's on discharge  - Continue silva at discharge  - Follow up with urology as an outpatient for TOV and cystoscopy    Bret Zheng, available on teams   Plan discussed with Dr. Tash [facility]  [address] [place], [place] [address] [phone]

## 2024-07-08 NOTE — CONSULT NOTE ADULT - ASSESSMENT
63-year-old male with no pertinent past medical history presents with urinary incontinence x 2 months. CT findings with bilateral hydronephrosis, left duplicated system and demonstrating tip of the silva catheter projects beyond the left posterolateral bladder wall margin, possibly within a collapsed diverticulum or external to the bladder.  nephrology consulted for yg and electrolyte abnormalities     YG  no hx of ckd per patient  YG likely sec to obstructive uropathy. scr improving. continue ivf   change 1/2ns to sodium bicarb gtt  silva inplace. drained >3L in ED  monitor bmp, urine output  f/u     acidosis  mix  likely sec to uremia and obstructive uropathy  change ivf as above  monitor    hyperkalemia  sec to yg and acidosis  ivf as above  renal function improving  s/p treatment  monitor close    severe b/l hydro  f/u gu  silva in place 63-year-old male with no pertinent past medical history presents with urinary incontinence x 2 months. CT findings with bilateral hydronephrosis, left duplicated system and demonstrating tip of the silva catheter projects beyond the left posterolateral bladder wall margin, possibly within a collapsed diverticulum or external to the bladder.  nephrology consulted for yg and electrolyte abnormalities     YG  no hx of ckd per patient  YG likely sec to obstructive uropathy. scr improving. continue ivf   change 1/2ns to sodium bicarb gtt  silva inplace. drained >3L in ED  monitor bmp, urine output  f/u     acidosis  mix-AG+non-AG  likely sec to uremia and obstructive uropathy  change ivf as above  monitor    hyperkalemia  sec to yg and acidosis  ivf as above  Serum K  improving  s/p treatment  low K diet  monitor close    severe b/l hydro  f/u gu  silva in place

## 2024-07-08 NOTE — DISCHARGE NOTE PROVIDER - PROVIDER TOKENS
FREE:[LAST:[Primary Care Provider],PHONE:[(   )    -],FAX:[(   )    -],FOLLOWUP:[Routine],ESTABLISHEDPATIENT:[T]] PROVIDER:[TOKEN:[[id number]],FOLLOWUP:[2 weeks]],FREE:[LAST:[Primary Care Provider],PHONE:[(   )    -],FAX:[(   )    -],FOLLOWUP:[1 week],ESTABLISHEDPATIENT:[T]],PROVIDER:[TOKEN:[[id number]],FOLLOWUP:[2 weeks]]

## 2024-07-08 NOTE — DISCHARGE NOTE PROVIDER - HOSPITAL COURSE
63-year-old male with no pertinent past medical history presents with urinary incontinence x 2 months and poor urinary stream w/ suprapubic pain found w/ YG on suspected CKD 2/2 obstructive nephropathy 2/2 suspected bladder mass.  Patient was admitted to telemetry service for further evaluation and treatment.  Labs notable for YG and hyperkalemia requiring lokelma as well as insulin & dextrose administration.  Indwelling silva catheter was placed with output of 5.6L.  Urology was consulted and recommended CT cystogram which showed---------.  Tamsulosin & proscar were continued, outpatient follow up with a cystoscopy and trial of void were recommended.  Patient was evaluated by infectious diseases and he was monitored off of antibiotics, urine culture was NGTD.  Nephorology was consulted given YG & acidemia, patient was treated w/IVF and bicarb w/improvement in renal function.     On ---- patient medically cleared for discharge home by Dr. Barone       63M no PMH presents with urinary incontinence x 2 months and poor urinary stream w/ suprapubic pain found w/ YG on suspected CKD 2/2 obstructive nephropathy 2/2 suspected bladder mass.    Acute kidney injury superimposed on CKD  - resolved; s/p silva placement  - Cr 5.25 on admission; Cr one month ago was 3.85; also noted w/ hyperK & metabolic acidosis 2/2 YG i/s/o b/l hydronephrosis and distended bladder c/f obstructive process  - nephro consulted: c/w sodium bicarb; outpatient follow up with Dr. Agustin in 2 weeks    Obstructive nephropathy  - patient noted with likely chronic b/l hydronephrosis and high PVR 2/2 enlarged prostate  - CT cystogram w/ enlarged prostate w/ prominent median lobe hypertrophy, diffuse bladder wall thickening 2/2 chronic bladder outlet obstruction  - urology consulted: c/w flomax, proscar; c/w silva catheter; no acute intervention; outpatient urology follow up with urology for TOV, cystoscopy    Chronic sinusitis  - incidental finding on CT head of near-complete opacification of L sphenoid sinus w/ internal inc'd density may relate to fungal type infection/chronic sinusitis w/ obstruction of sphenoethmoidal recess  - outpatient ENT follow up    Case discussed with Dr. Barone on 7/16. Patient is medically stable and optimized for discharge home as per attending. All medications were reviewed and prescriptions were sent to a mutually agreed upon pharmacy. Discharge plan reviewed with patient.

## 2024-07-08 NOTE — DISCHARGE NOTE PROVIDER - CARE PROVIDER_API CALL
Primary Care Provider,   Phone: (   )    -  Fax: (   )    -  Established Patient  Follow Up Time: Routine   Christiano Agustin  Nephrology  [address], [address]  [place] [address]  Phone: [phone]  Fax: [phone]  Follow Up Time: 2 weeks    Primary Care Provider,   Phone: (   )    -  Fax: (   )    -  Established Patient  Follow Up Time: 1 week    Florian Bianchi  Urology  [address] [place] [address], [address]  [place] [address]  Phone: [phone]  Fax: [phone]  Follow Up Time: 2 weeks

## 2024-07-08 NOTE — PROGRESS NOTE ADULT - ASSESSMENT
{\rtf1\iocuhb27213\ansi\dudpjan5814\ftnbj\uc1\deff0  {\fonttbl{\f0 \fnil Segoe UI;}{\f1 \fnil \fcharset0 Segoe UI;}{\f2 \fnil [employer] [place];}}  {\colortbl ;\plf773\wloty071\zosq688 ;\red0\green0\blue0 ;\red0\green0\toxl371 ;\red0\green0\blue0 ;}  {\stylesheet{\f0\fs20 Normal;}{\cs1 Default Paragraph Font;}{\cs2\f0\fs16 Line Number;}{\cs3\f2\fs24\ul\cf3 Hyperlink;}}  {\*\revtbl{Unknown;}}  \dumzdo97573\wrsnpu50308\xvaod4469\ijhfa3824\pwans7038\dyoyd0621\hbcaahg254\qozoqth773\nogrowautofit\vjugui442\formshade\nofeaturethrottle1\dntblnsbdb\fet4\aendnotes\aftnnrlc\pgbrdrhead\pgbrdrfoot  \sectd\yigfve62267\alzwdy15587\guttersxn0\caywnbmr2950\qmwcvjwj7017\zcaocqkl9352\gbtaimsh8780\amjoemr493\fmfjbxj024\sbkpage\pgncont\pgndec  \plain\plain\f0\fs24\ql\plain\f0\fs24\plain\f1\fs16\hyoc4242\hich\f1\dbch\f1\loch\f1\cf2\fs16 63-year-old male with no pertinent past medical history presents with urinary incontinence x 2 months and poor urinary stream w/ suprapubic pain found w/ YG   on suspected CKD 2/2 obstructive nephropathy 2/2 suspected bladder mass\par  \par  \par  \plain\f1\fs16\wunu4165\hich\f1\dbch\f1\loch\f1\cf2\fs16\b\ul{\field{\*\fldinst HYPERLINK [id number] }{\fldrslt Problem/Plan - 1:}}\plain\f1\fs16\hrvo6077\hich\f1\dbch\f1\loch\f1\cf2\fs16\ql\par  \'b7  {\*\bkmkstart jz24496472419}{\*\bkmkend hp07942958834}Problem: {\*\bkmkstart xs80465583765}{\*\bkmkend tv07278407686}Acute kidney injury superimposed on CKD. \par  \'b7  {\*\bkmkstart tl07214150987}{\*\bkmkend cd93990161512}Plan: {\*\bkmkstart oa37713793862}{\*\bkmkend cp43704388283}-Pt p/w Cr 5.25. Cr one month ago was 3.85. Also noted with hyperkalemia and metabolic acidosis 2/2 YG. In the setting of b/l hydronephrosis   and distended bladder c/f obstructive process\par  -s/p silva placement in ED with 5.6L outpt since presentation. Bloody urine but no clots noted. \par  -trend BMP q6h for now. -monitor Is and Os.\plain\f1\fs16\phaf8205\hich\f1\dbch\f1\loch\f1\cf2\fs16\strike\plain\f1\fs16\dqog0568\hich\f1\dbch\f1\loch\f1\cf2\fs16\par  \par  \plain\f1\fs16\ptyl7459\hich\f1\dbch\f1\loch\f1\cf2\fs16\b\ul{\field{\*\fldinst HYPERLINK [id number] }{\fldrslt Problem/Plan - 2:}}\plain\f1\fs16\yjcs7931\hich\f1\dbch\f1\loch\f1\cf2\fs16\ql\par  \'b7  {\*\bkmkstart jk57980799097}{\*\bkmkend fp19292239826}Problem: {\*\bkmkstart vv78612754412}{\*\bkmkend pk10759734655}Obstructive nephropathy. \par  \'b7  {\*\bkmkstart hl77329833841}{\*\bkmkend uq74299549822}Plan: {\*\bkmkstart st00843905893}{\*\bkmkend io55364268694}-pt noted with likely chronic b/l hydronephrosis and high PVR\par  -2/2 enlarged prostate and possible bladder mass\par  -silva placed however as per CT scan tip of the Silva catheter projects beyond the left posterolateral bladder wall margin, possibly within a collapsed diverticulum or external to the bladder\par  - check cystogram\par  - urology fu \par  \par  \plain\f1\fs16\uhai8644\hich\f1\dbch\f1\loch\f1\cf2\fs16\b\ul{\field{\*\fldinst HYPERLINK [id number] }{\fldrslt Problem/Plan - 3:}}\plain\f1\fs16\fvyv9863\hich\f1\dbch\f1\loch\f1\cf2\fs16\ql\par  \'b7  {\*\bkmkstart ax13903648388}{\*\bkmkend dy63288882885}Problem: {\*\bkmkstart lf48774499404}{\*\bkmkend jf59519518646}Headache. \par  \'b7  {\*\bkmkstart ku70784839591}{\*\bkmkend im98764987385}Plan: {\*\bkmkstart zc98844716351}{\*\bkmkend yf04576819349}-pt reports headache now resolved\par  -suspect likely tension headache 2/2 worsening uremia, urinary rentention\par  -CT head no acute pathology.\par  \par  \plain\f1\fs16\ubkf6079\hich\f1\dbch\f1\loch\f1\cf2\fs16\b\ul{\field{\*\fldinst HYPERLINK [id number] }{\fldrslt Problem/Plan - 4:}}\plain\f1\fs16\jczv3403\hich\f1\dbch\f1\loch\f1\cf2\fs16\ql\par  \'b7  {\*\bkmkstart xe19632826053}{\*\bkmkend bh73593856293}Problem: {\*\bkmkstart ro25771044037}{\*\bkmkend ll41815602839}Elevated blood pressure reading. \par  \'b7  {\*\bkmkstart ad50979036006}{\*\bkmkend it41183541287}Plan: {\*\bkmkstart oy27310543484}{\*\bkmkend js59184392839}-noted with SBP 180s, unclear if related to urinary retention or pain however pt also with subconjunctival hemorrhage of left eye\par  -monitor BP closely and will need to start medications if continues to be elevated.\par  \par  \plain\f1\fs16\pyvo4397\hich\f1\dbch\f1\loch\f1\cf2\fs16\b\ul{\field{\*\fldinst HYPERLINK [id number] }{\fldrslt Problem/Plan - 5:}}\plain\f1\fs16\znpq7635\hich\f1\dbch\f1\loch\f1\cf2\fs16\ql\par  \'b7  {\*\bkmkstart en23734216011}{\*\bkmkend pn32467588393}Problem: {\*\bkmkstart jy23120116598}{\*\bkmkend tu76363993932}Chronic sinusitis. \par  \'b7  {\*\bkmkstart mj14334204254}{\*\bkmkend qq70976014741}Plan: {\*\bkmkstart ke50138806070}{\*\bkmkend io99156595119}-incidential finding on CT head of: near-complete opacification of left sphenoid sinus with internal increased density may relate to   fungal type infection/chronic sinusitis with obstruction of the sphenoethmoidal recess\par  - outpt fu \par  \par  \plain\f1\fs16\ydsn3091\hich\f1\dbch\f1\loch\f1\cf2\fs16\b\ul{\field{\*\fldinst HYPERLINK [id number] }{\fldrslt Problem/Plan - 6:}}\plain\f1\fs16\xynp4416\hich\f1\dbch\f1\loch\f1\cf2\fs16\ql\par  \'b7  {\*\bkmkstart ed18726429476}{\*\bkmkend qb78335456574}Problem: {\*\bkmkstart zu84741113714}{\*\bkmkend ne31808787476}Need for prophylactic measure. \par  \'b7  {\*\bkmkstart bw87561502166}{\*\bkmkend ve07490524001}Plan: {\*\bkmkstart lt79983534273}{\*\bkmkend um87991117351}DVT ppx: SCDs given hematuria.\par  \par  \plain\f0\fs20\rhmb6124\hich\f0\dbch\f0\loch\f0\fs20\par  }

## 2024-07-08 NOTE — DISCHARGE NOTE PROVIDER - CARE PROVIDERS DIRECT ADDRESSES
,DirectAddress_Unknown ,[email or web address],DirectAddress_Unknown,DirectAddress_Unknown

## 2024-07-08 NOTE — CONSULT NOTE ADULT - ASSESSMENT
62 y/o M PMhx w/ no pertinent past medical history who presented w/ urinary incontinence and suprapubic pain    YG, hydronephrosis  afebrile, no leukocytosis  no dysuria, flank pain  UA negative  CT- Severe bilateral hydroureteronephrosis. No clear evidence of obstructing ureteral calculus. Marked lobulated bladder wall thickening despite underdistention secondary to Silva catheter. Enlarged prostate Presence or absence of bladder mass is not adequately evaluated. Tip of the Silva catheter projects beyond the left posterolateral bladder wall margin, possibly within a collapsed diverticulum or external to the bladder.   endorses frequency/ incontinence, nocturia- symptoms may be related to BPH or ?bladder mass    Recommendations  monitor off antibiotics  silva per nephrology    Alessandro Loaiza M.D.  [facility], Division of Infectious Diseases  [phone]  After 5pm on weekdays and all day on weekends - please call [phone]  64 y/o M PMhx w/ no pertinent past medical history who presented w/ urinary incontinence and suprapubic pain    YG, hydronephrosis, urinary retention  afebrile, no leukocytosis  no dysuria, flank pain  s/p silva placement  UA negative  CT- Severe bilateral hydroureteronephrosis. No clear evidence of obstructing ureteral calculus. Marked lobulated bladder wall thickening despite underdistention secondary to Sivla catheter. Enlarged prostate Presence or absence of bladder mass is not adequately evaluated. Tip of the Silva catheter projects beyond the left posterolateral bladder wall margin, possibly within a collapsed diverticulum or external to the bladder.   endorses frequency/ incontinence, nocturia- symptoms may be related to BPH or ?bladder mass    Recommendations  monitor off antibiotics  silva per nephrology    Alessandro Loaiza M.D.  [facility], Division of Infectious Diseases  [phone]  After 5pm on weekdays and all day on weekends - please call [phone]

## 2024-07-08 NOTE — DISCHARGE NOTE PROVIDER - NSDCFUADDAPPT_GEN_ALL_CORE_FT
Follow up with your primary care provider in 1-2 weeks of discharge.    Follow up with urology at the [facility] in 2 weeks of discharge for trial of void and cystoscopy.    Follow up with nephrology, Dr. Agustin, in 2 weeks of discharge.    Follow up with ENT, outpatient for evaluation of possible sinusitis seen on imaging.

## 2024-07-08 NOTE — DISCHARGE NOTE PROVIDER - NSFOLLOWUPCLINICS_GEN_ALL_ED_FT
[facility] at [facility]  Urology  [address] [place] [address], [place] [address]  Phone: [phone]  Fax:   Follow Up Time: Routine

## 2024-07-08 NOTE — DISCHARGE NOTE PROVIDER - NSDCCPTREATMENT_GEN_ALL_CORE_FT
PRINCIPAL PROCEDURE  Procedure: CT scan of abdomen and pelvis  Findings and Treatment: IMPRESSION:  Enlarged prostate with prominent median lobe hypertrophy. No evidence of bladder perforation. Diffuse bladder wall thickening with circumferential trabeculated appearance, likely in the setting of chronic bladder outlet obstruction. Debris surrounding Pratt catheter tip, possibly small blood products.      SECONDARY PROCEDURE  Procedure: CT head  Findings and Treatment: IMPRESSION:  No acute intracranial hemorrhage, mass effect, or midline shift.  Near-complete opacification of left sphenoid sinus with internal   increased density may relate to fungal type infection/chronic sinusitis   with obstruction of the sphenoethmoidal recess.

## 2024-07-08 NOTE — CONSULT NOTE ADULT - SUBJECTIVE AND OBJECTIVE BOX
[facility] [place] [facility]   MD LASHON GARCIA MD ANGELA WONG, PA        TEL:  OFFICE: [phone]  From 5pm-7am answering service [phone]    --- INITIAL RENAL CONSULT NOTE ---date of service 07-08-24 @ 11:54    HPI:  63-year-old male with no pertinent past medical history presents with urinary incontinence x 2 months and poor urinary stream w/ suprapubic pain. Patient states he has had episodes of nocturnal urinary incontinence when falling asleep intermittently for the past 2 months.  Patient also reports intermittent mild discomfort at left lower quadrant/suprapubic area.  He states 1 month ago, he had ultrasound performed which showed "a lobulated solid mass in the posterior wall of the urinary bladder...raising possibility of a neoplastic polyp.  Urinary bladder is markedly dilated with irregular wall thickening likely due to chronic cystitis.  PVR large.  Severe bilateral hydronephrosis.  Severe bilateral hydroureter.  Prostate is not grossly enlarged.  Urinary bladder obstruction considered."  Patient also brings copies of lab work from 6/7/2024 showing a BUN of 62 and a creatinine of 3.85.  Patient also reports intermittent episodes of difficulty starting urinary stream, has small volume urine output.  Patient also notes that over the last few weeks had had mild generalized headaches described as discomfort without associated visual changes or focal weakness. He also notes feeling malaise and nauseous, poor appetite   He states after placement of silva, he has resolution of headache and generalized malaise. Still noting some LLQ discomfort. Patient denies any fevers, chills, changes in vision or hearing, numbness, focal weakness, dizziness, chest pain, shortness of breath, dysuria, cloudy urine, hematuria, flank pain, testicular pain. He denies any facial pain, purulent nasal discharge or nasal congestion.  Notes blood shot left eye for the last few days, denies trauma, denies any change in visual acuity or eye pain. States he was evaluated by an opthalmologist in the ER and was told everything looked fine. nephrology consulted for al and electrolyte abnormalities         Allergies:  No Known Allergies      PAST MEDICAL & SURGICAL HISTORY:  No pertinent past medical history      No significant past surgical history          Home Medications Reviewed    Hospital Medications:   MEDICATIONS  (STANDING):  finasteride 5 milliGRAM(s) Oral daily  sodium bicarbonate  Infusion 0.251 mEq/kG/Hr (125 mL/Hr) IV Continuous <Continuous>  tamsulosin 0.4 milliGRAM(s) Oral at bedtime      SOCIAL HISTORY:  Denies ETOh, Smoking,     FAMILY HISTORY:  No pertinent family history in first degree relatives        REVIEW OF SYSTEMS:  CONSTITUTIONAL: + weakness, no fevers or chills  EYES/ENT: No visual changes;  No vertigo or throat pain, left eye blood shot  NECK: No pain or stiffness  RESPIRATORY: No cough, wheezing, hemoptysis; No shortness of breath  CARDIOVASCULAR: No chest pain or palpitations.  GASTROINTESTINAL: see hpi  GENITOURINARY: No dysuria, frequency, foamy urine, urinary urgency, incontinence or hematuria  NEUROLOGICAL: No numbness or weakness  SKIN: No itching, burning, rashes, or lesions   VASCULAR: No bilateral lower extremity edema.   All other review of systems is negative unless indicated above.    VITALS:  T(F): 98 (07-08-24 @ 09:04), Max: 98.7 (07-08-24 @ 02:00)  HR: 83 (07-08-24 @ 09:04)  BP: 118/74 (07-08-24 @ 09:04)  RR: 16 (07-08-24 @ 09:04)  SpO2: 100% (07-08-24 @ 09:04)  Wt(kg): --    07-07 @ 07:01  -  07-08 @ 07:00  --------------------------------------------------------  IN: 480 mL / OUT: 7350 mL / NET: -6870 mL          PHYSICAL EXAM:  General: NAD  HEENT: left conjunctival hemorrhage   Neck: No JVD  Respiratory: CTAB, no wheezes, rales or rhonchi  Cardiovascular: S1, S2, RRR  Gastrointestinal: BS+, soft, NT/ND  Extremities: No cyanosis or clubbing. No peripheral edema  Neurological: A/O x 3, no focal deficits  Psychiatric: Normal mood, normal affect  : + silva.   Skin: No rashes  Vascular Access: none    LABS:  07-08    139  |  106  |  83<H>  ----------------------------<  95  4.7   |  14<L>  |  4.80<H>    Ca    8.9      08 Jul 2024 07:05    TPro  7.5  /  Alb  4.7  /  TBili  0.3  /  DBili      /  AST  <5<L>  /  ALT  7   /  AlkPhos  56  07-07    Creatinine Trend: 4.80 <--, 5.28 <--, 5.14 <--, 4.94 <--, 5.25 <--                        9.8    6.93  )-----------( 160      ( 08 Jul 2024 07:05 )             30.3     Urine Studies:  Urinalysis Basic - ( 08 Jul 2024 07:05 )    Color:  / Appearance:  / SG:  / pH:   Gluc: 95 mg/dL / Ketone:   / Bili:  / Urobili:    Blood:  / Protein:  / Nitrite:    Leuk Esterase:  / RBC:  / WBC    Sq Epi:  / Non Sq Epi:  / Bacteria:           RADIOLOGY & ADDITIONAL STUDIES:                
[facility], Division of Infectious Diseases  DOMINGO Torre S. Shah, Y. Patel, G. Josse  [phone]  ([phone] - weekdays after 5pm and weekends)    CRYSTAL SIEGEL  63y, Male  [id number]    HPI--  HPI:  64 y/o M PMhx w/ no pertinent past medical history who presented w/ urinary incontinence and suprapubic pain. He reports urinary incontinence for about 2 months w/ weak urinary stream along w/ nocturia. States lower quadrant/suprapubic pain for 1-2 months. During this time has also had nausea and decreased PO intake. Per notes prior US demonstrated "a lobulated solid mass in the posterior wall of the urinary bladder...raising possibility of a neoplastic polyp." "severe bilateral hydronephrosis". Also endorsed headache which has improved since admission. Denies sinus pain, discharge, nasal congestion. Reports waking one day w/ color change to his left eye for which he saw an ophthalmologist and was told it will clear up on its own in a couple of days.  Denies fever, chills, chest pain, SOB, diarrhea, dysuria.     ROS: 10 point review of systems completed, pertinent positives and negatives as per HPI.    Allergies: No Known Allergies    PMH -- No pertinent past medical history      PSH -- No significant past surgical history      FH -- No pertinent family history in first degree relatives      Social History -- denies tobacco, alcohol or illicit drug use    Physical Exam--  Vital Signs Last 24 Hrs  T(F): 98 (08 Jul 2024 09:04), Max: 98.7 (08 Jul 2024 02:00)  HR: 83 (08 Jul 2024 09:04) (76 - 87)  BP: 118/74 (08 Jul 2024 09:04) (118/74 - 183/109)  RR: 16 (08 Jul 2024 09:04) (16 - 18)  SpO2: 100% (08 Jul 2024 09:04) (98% - 100%)  General: nontoxic-appearing, no acute distress  HEENT: anicteric, conjunctival hemorrhage  Lungs: Clear bilaterally without rales  Heart: S1, S2, normal rate.   Abdomen: Soft. Nondistended. Nontender.   : silva  Neuro: AAOx3, no obvious focal deficits   Back: No costovertebral angle tenderness.  Extremities: trace LE edema.   Skin: Warm. Dry. No rash.  Psychiatric: Appropriate affect and mood for situation.     Laboratory & Imaging Data--  CBC:                       9.8    6.93  )-----------( 160      ( 08 Jul 2024 07:05 )             30.3     WBC Count: 6.93 K/uL (07-08-24 @ 07:05)  WBC Count: 5.21 K/uL (07-07-24 @ 09:15)    CMP: 07-08    139  |  106  |  83<H>  ----------------------------<  95  4.7   |  14<L>  |  4.80<H>    Ca    8.9      08 Jul 2024 07:05    TPro  7.5  /  Alb  4.7  /  TBili  0.3  /  DBili  x   /  AST  <5<L>  /  ALT  7   /  AlkPhos  56  07-07    LIVER FUNCTIONS - ( 07 Jul 2024 09:15 )  Alb: 4.7 g/dL / Pro: 7.5 g/dL / ALK PHOS: 56 U/L / ALT: 7 U/L / AST: <5 U/L / GGT: x           Urinalysis Basic - ( 08 Jul 2024 07:05 )    Color: x / Appearance: x / SG: x / pH: x  Gluc: 95 mg/dL / Ketone: x  / Bili: x / Urobili: x   Blood: x / Protein: x / Nitrite: x   Leuk Esterase: x / RBC: x / WBC x   Sq Epi: x / Non Sq Epi: x / Bacteria: x      Microbiology:       Radiology--  ***  Active Medications--  acetaminophen     Tablet .. 650 milliGRAM(s) Oral every 6 hours PRN  aluminum hydroxide/magnesium hydroxide/simethicone Suspension 30 milliLiter(s) Oral every 4 hours PRN  finasteride 5 milliGRAM(s) Oral daily  melatonin 3 milliGRAM(s) Oral at bedtime PRN  ondansetron Injectable 4 milliGRAM(s) IV Push every 8 hours PRN  sodium bicarbonate  Infusion 0.251 mEq/kG/Hr IV Continuous <Continuous>  sodium chloride 0.65% Nasal 1 Spray(s) Both Nostrils two times a day PRN  tamsulosin 0.4 milliGRAM(s) Oral at bedtime    Antimicrobials:     Immunologic:   
Urology Consult    Patient is a 63-year-old male with no pertinent past medical history presents with urinary incontinence x 2 months.  Patient states he has had episodes of nocturnal urinary incontinence when falling asleep intermittently for the past 2 months.  Patient also reports intermittent mild discomfort at left lower quadrant.  He states 1 month ago, he had ultrasound performed which showed "a lobulated solid mass in the posterior wall of the urinary bladder...raising possibility of a neoplastic polyp.  Urinary bladder is markedly dilated with irregular wall thickening likely due to chronic cystitis.  PVR large.  Severe bilateral hydronephrosis.  Severe bilateral hydroureter.  Prostate is not grossly enlarged.  Urinary bladder obstruction considered."  Patient also brings copies of lab work from 2024 showing a BUN of 62 and a creatinine of 3.85.  Patient also reports intermittent episodes of difficulty starting urinary stream.  Patient also reports mild generalized headaches described as discomfort for the past 2 to 3 months.  He states no active headache at this time.  Patient denies any fevers, chills, changes in vision or hearing, numbness, weakness, dizziness, chest pain, shortness of breath, dysuria, cloudy urine, hematuria, flank pain, testicular pain.      Urology consulted due to CT findings demonstrating tip of the Pratt catheter projects beyond the left posterolateral bladder wall margin, possibly within a collapsed diverticulum or external   to the bladder. Correlate with Pratt catheter function. Consider revision of position as clinically warranted. Has experienced intermittent episodes of difficulty urinating and weak stream for the past few weeks. No previous episodes. No history of urological interventions, malignancy. Has not seen urologist in past. During ER course 16 Ecu[place] placed by ED staff w/ 3100 ml output. Found to have Cr 5.25, K+ 6.2, UA unremarkable and PH of 7.26. Denies fever, chills, nausea/vomiting.         PAST MEDICAL & SURGICAL HISTORY:    FAMILY HISTORY:    SOCIAL HISTORY:   Tobacco hx:    MEDICATIONS  (STANDING):    MEDICATIONS  (PRN):    Allergies    No Known Allergies    Intolerances    REVIEW OF SYSTEMS: Pertinent positives and negatives as stated in HPI, otherwise negative    Vital signs  T(C): 36.7 (24 @ 07:45), Max: 36.7 (24 @ 07:45)  HR: 60 (24 @ 07:45)  BP: 150/86 (24 @ 07:45)  SpO2: 100% (24 @ 07:45)  Wt(kg): --    Vital signs reviewed.   CONSTITUTIONAL: Well-appearing; well-nourished; in no apparent distress. Non-toxic appearing.   HEAD: Normocephalic, atraumatic.  EYES: Normal conjunctiva and no sclera injection noted  ENT: Normal nose; no rhinorrhea.  CARD: Normal S1, S2  RESP: Normal chest excursion with respiration; breath sounds clear and equal bilaterally  ABD/GI: Soft, non-distended; non-tender.  EXT/MS: moves all extremities; distal pulses are normal, no pedal edema.  SKIN: Normal for age and race; warm  NEURO: Awake, alert, oriented x 3  PSYCH: Normal mood; appropriate affect.    LABS:       @ 09:15    WBC 5.21  / Hct 28.6  / SCr 5.25         137  |  106  |  79<H>  ----------------------------<  83  6.2<HH>   |  15<L>  |  5.25<H>    Ca    9.1      2024 09:15    TPro  7.5  /  Alb  4.7  /  TBili  0.3  /  DBili  x   /  AST  <5<L>  /  ALT  7   /  AlkPhos  56        Urinalysis Basic - ( 2024 09:15 )    Color: Yellow / Appearance: Clear / S.014 / pH: x  Gluc: 83 mg/dL / Ketone: Negative mg/dL  / Bili: Negative / Urobili: 0.2 mg/dL   Blood: x / Protein: 100 mg/dL / Nitrite: Negative   Leuk Esterase: Trace / RBC: 0 /HPF / WBC 5 /HPF   Sq Epi: x / Non Sq Epi: 0 /HPF / Bacteria: Negative /HPF        Urine Cx:   Blood Cx:    RADIOLOGY:      CONTRAST/COMPLICATIONS:  IV Contrast: NONE  Oral Contrast: NONE  Complications: None reported at time of study completion    PROCEDURE:  CT of the Chest, Abdomen and Pelvis was performed.  Sagittal and coronal reformats were performed.    FINDINGS:    CHEST:  LUNGS AND LARGE AIRWAYS: Central airways are patent. No large focal   consolidation. Minimal lung scarring.  PLEURA: No pleural effusion or pneumothorax.  VESSELS: Normal caliber of the thoracic aorta and main pulmonary artery.  HEART: Heart size is qualitatively within normal limits. Aortic valve and   multivessel coronary artery calcifications. Trace pericardial fluid. Low   attenuation of the intracardiac blood pool suggesting anemia.  MEDIASTINUM AND LALO: No enlarged lymph nodes of the thorax. Esophagus is   nondistended  CHEST WALL AND LOWER NECK: No chest wall hematoma. Visualized thyroid is   unremarkable.    ABDOMEN AND PELVIS:    Solid organ evaluation limited due to noncontrast technique.    LIVER: Liver size within normal limits.  BILE DUCTS: No distention  GALLBLADDER: Unremarkable CT appearance  SPLEEN: Spleen size within normal limits  PANCREAS: No acute peripancreatic inflammation  ADRENALS: Unremarkable  KIDNEYS/URETERS: Severe bilateral hydroureteronephrosis. Left renal   collecting system appears duplicated, poorly delineated due to lack of IV   contrast. Bilateral ureteral tortuosity is noted, difficult to evaluate   due to lack of contrast. No clear evidence of obstructing ureteral   calculus.    BLADDER: Marked lobulated bladder wall thickening despite underdistention   secondary to Pratt catheter. Correlate with urinalysis, urine cytology,   and urology evaluation given concern for bladder mass. Presence or   absence of bladder mass is not adequately evaluated on this study.    Tip of the Pratt catheter projects beyond the left posterolateral bladder   wall margin, possibly within a collapsed diverticulum or external to the   bladder. Correlate with Pratt catheter function. Consider revision of   position as clinically warranted. CT cystogram can be obtained as   clinically required.    REPRODUCTIVE ORGANS: Enlarged prostate, protruding into the urinary   bladder base secondary to median lobe hypertrophy.    BOWEL: Stomach is underdistended. No small bowel distention. Appendix is   not visualized. Mild stool burden of the colon limits evaluation of the   colonic mucosa. Colonic diverticulosis, without diverticulitis.  PERITONEUM/RETROPERITONEUM: Trace ascites.  VESSELS: No abdominal aortic aneurysm. Atheromatous changes.  LYMPH NODES: Left para-aortic density measures 2.3 x 1.4 cm, image 95   series 301, possibly a lymph node or of vascular etiology.  ABDOMINAL WALL: Tiny fat-containing umbilical hernia.  BONES: Degenerative changes. Osseous demineralization.    IMPRESSION:    Limited noncontrast study.    CHEST:  -No pneumonia. No clear evidence of intrathoracic malignancy.  -Aortic valve calcification and multivessel coronary artery   calcifications.    ABDOMEN/PELVIS:  -Severe bilateral hydroureteronephrosis. Left renal collecting system   appears duplicated. Bilateral ureteral tortuosity is noted, difficult to   evaluate due to lack of contrast. No clear evidence of obstructing   ureteral calculus.  -Marked lobulated bladder wall thickening despite underdistention   secondary to Pratt catheter. Enlarged prostate with median lobe   hypertrophy. Presence or absence of bladder mass is not adequately   evaluated on this study. Correlate with urinalysis, urine cytology, PSA,   urology evaluation.  -Tip of the Pratt catheter projects beyond the left posterolateral   bladder wall margin, possibly within a collapsed diverticulum or external   to the bladder. Correlate with Pratt catheter function. Consider revision   of position as clinically warranted. This was discussed with Dr. Celestino Alvarado on 2024 at 11:24AM. CT cystogram can be obtained as clinically   required.  -Left para-aortic density measures 2.3 x 1.4 cm, image 95 series 301,   possibly a lymph node or of vascular etiology.  -Trace ascites.    --- End of Report ---      MARCELINA STEWART MD; Resident Radiologist  This document has been electronically signed.  YESSY LOYD M.D., ATTENDING RADIOLOGIST  This document has been electronically signed. 2024 11:26AM

## 2024-07-08 NOTE — DISCHARGE NOTE PROVIDER - NSDCCPCAREPLAN_GEN_ALL_CORE_FT
PRINCIPAL DISCHARGE DIAGNOSIS  Diagnosis: Hyperkalemia  Assessment and Plan of Treatment: Your potassium levels were high because your kidney function was damaged.  Continue to follow up with your kidney doctor for continued monitoring of your kidney function.      SECONDARY DISCHARGE DIAGNOSES  Diagnosis: YG (acute kidney injury)  Assessment and Plan of Treatment:     Diagnosis: Obstructive nephropathy  Assessment and Plan of Treatment: You will go home with your silva catheter.  Shanna melendez with urology as an outpatient for a cystoscopy and trial of void (catheter removal)     PRINCIPAL DISCHARGE DIAGNOSIS  Diagnosis: Hyperkalemia  Assessment and Plan of Treatment: Your potassium levels were high because your kidney function was decreased. Continue to follow up with your kidney doctor for continued monitoring of your kidney function. Follow up with your primary care provider in 1-2 weeks of discharge.      SECONDARY DISCHARGE DIAGNOSES  Diagnosis: Obstructive nephropathy  Assessment and Plan of Treatment: You will go home with your silva catheter.  Follow up with urology, Dr. Bianchi, at the [facility] as an outpatient for a cystoscopy and trial of void (catheter removal).    Diagnosis: YG (acute kidney injury)  Assessment and Plan of Treatment: You were found to have an acute injury superimposed on chronic kidney disease due to obstruction caused by an enlarged prostate.    Diagnosis: Chronic sinusitis  Assessment and Plan of Treatment: Follow up with an ear, nose, and throat doctor on discharge for evaluation of chronic sinusitis (inflammation of your sinuses).

## 2024-07-08 NOTE — DISCHARGE NOTE PROVIDER - NSDCMRMEDTOKEN_GEN_ALL_CORE_FT
finasteride 5 mg oral tablet: 1 tab(s) orally once a day  sodium bicarbonate 650 mg oral tablet: 1 tab(s) orally 3 times a day  sodium chloride 0.65% nasal spray: 1 application nasal 2 times a day  tamsulosin 0.4 mg oral capsule: 1 cap(s) orally once a day (at bedtime)

## 2024-07-09 LAB
ANION GAP SERPL CALC-SCNC: 18 MMOL/L — HIGH (ref 7–14)
BUN SERPL-MCNC: 67 MG/DL — HIGH (ref 7–23)
CALCIUM SERPL-MCNC: 8.4 MG/DL — SIGNIFICANT CHANGE UP (ref 8.4–10.5)
CHLORIDE SERPL-SCNC: 104 MMOL/L — SIGNIFICANT CHANGE UP (ref 98–107)
CO2 SERPL-SCNC: 17 MMOL/L — LOW (ref 22–31)
CREAT SERPL-MCNC: 3.71 MG/DL — HIGH (ref 0.5–1.3)
EGFR: 17 ML/MIN/1.73M2 — LOW
GLUCOSE SERPL-MCNC: 100 MG/DL — HIGH (ref 70–99)
HCT VFR BLD CALC: 27.2 % — LOW (ref 39–50)
HGB BLD-MCNC: 9.2 G/DL — LOW (ref 13–17)
MAGNESIUM SERPL-MCNC: 1.7 MG/DL — SIGNIFICANT CHANGE UP (ref 1.6–2.6)
MCHC RBC-ENTMCNC: 31.1 PG — SIGNIFICANT CHANGE UP (ref 27–34)
MCHC RBC-ENTMCNC: 33.8 GM/DL — SIGNIFICANT CHANGE UP (ref 32–36)
MCV RBC AUTO: 91.9 FL — SIGNIFICANT CHANGE UP (ref 80–100)
NRBC # BLD: 0 /100 WBCS — SIGNIFICANT CHANGE UP (ref 0–0)
NRBC # FLD: 0 K/UL — SIGNIFICANT CHANGE UP (ref 0–0)
PHOSPHATE SERPL-MCNC: 4.3 MG/DL — SIGNIFICANT CHANGE UP (ref 2.5–4.5)
PLATELET # BLD AUTO: 145 K/UL — LOW (ref 150–400)
POTASSIUM SERPL-MCNC: 4.8 MMOL/L — SIGNIFICANT CHANGE UP (ref 3.5–5.3)
POTASSIUM SERPL-SCNC: 4.8 MMOL/L — SIGNIFICANT CHANGE UP (ref 3.5–5.3)
RBC # BLD: 2.96 M/UL — LOW (ref 4.2–5.8)
RBC # FLD: 13.6 % — SIGNIFICANT CHANGE UP (ref 10.3–14.5)
SODIUM SERPL-SCNC: 139 MMOL/L — SIGNIFICANT CHANGE UP (ref 135–145)
WBC # BLD: 5.51 K/UL — SIGNIFICANT CHANGE UP (ref 3.8–10.5)
WBC # FLD AUTO: 5.51 K/UL — SIGNIFICANT CHANGE UP (ref 3.8–10.5)

## 2024-07-09 PROCEDURE — 99232 SBSQ HOSP IP/OBS MODERATE 35: CPT

## 2024-07-09 PROCEDURE — 74176 CT ABD & PELVIS W/O CONTRAST: CPT | Mod: 26

## 2024-07-09 RX ADMIN — SODIUM BICARBONATE 125 MEQ/KG/HR: 650 TABLET ORAL at 09:27

## 2024-07-09 RX ADMIN — Medication 5 MILLIGRAM(S): at 13:06

## 2024-07-09 RX ADMIN — SODIUM BICARBONATE 125 MEQ/KG/HR: 650 TABLET ORAL at 22:19

## 2024-07-09 RX ADMIN — SODIUM BICARBONATE 125 MEQ/KG/HR: 650 TABLET ORAL at 06:10

## 2024-07-09 NOTE — PROGRESS NOTE ADULT - ASSESSMENT
64 y/o M PMhx w/ no pertinent past medical history who presented w/ urinary incontinence and suprapubic pain    YG, hydronephrosis, urinary retention  afebrile, no leukocytosis  no dysuria, flank pain  s/p silva placement  UA and urine culture  CT- Severe bilateral hydroureteronephrosis. Marked lobulated bladder wall thickening. Enlarged prostate; Presence or absence of bladder mass is not adequately evaluated. Tip of the Silva catheter projects beyond the left posterolateral bladder wall margin, possibly within a collapsed diverticulum or external to the bladder.   endorses frequency/ incontinence, nocturia- symptoms may be related to BPH or ?bladder mass    Recommendations  monitor off antibiotics  silva per urology  CT urogram pending    Alessandro Loaiza M.D.  [facility], Division of Infectious Diseases  [phone]  After 5pm on weekdays and all day on weekends - please call [phone]  64 y/o M PMhx w/ no pertinent past medical history who presented w/ urinary incontinence and suprapubic pain    YG, hydronephrosis, urinary retention  afebrile, no leukocytosis  no dysuria, flank pain  s/p silva placement  UA and urine culture  CT- Severe bilateral hydroureteronephrosis. Marked lobulated bladder wall thickening. Enlarged prostate; Presence or absence of bladder mass is not adequately evaluated. Tip of the Silva catheter projects beyond the left posterolateral bladder wall margin, possibly within a collapsed diverticulum or external to the bladder.   endorses frequency/ incontinence, nocturia- symptoms may be related to BPH or ?bladder mass    Recommendations  monitor off antibiotics  trend creatinine  silva per urology  CT urogram pending    Alessandro Loaiza M.D.  [facility], Division of Infectious Diseases  [phone]  After 5pm on weekdays and all day on weekends - please call [phone]

## 2024-07-09 NOTE — PROGRESS NOTE ADULT - ASSESSMENT
63-year-old male with no pertinent past medical history presents with urinary incontinence x 2 months and poor urinary stream w/ suprapubic pain found w/ YG on suspected CKD 2/2 obstructive nephropathy 2/2 suspected bladder mass      Problem/Plan - 1:  ·  Problem: Acute kidney injury superimposed on CKD.   ·  Plan: -Pt p/w Cr 5.25. Cr one month ago was 3.85. Also noted with hyperkalemia and metabolic acidosis 2/2 YG. In the setting of b/l hydronephrosis and distended bladder c/f obstructive process  -s/p silva placement in ED with 5.6L outpt since presentation. Bloody urine but no clots noted.   -trend BMP q6h for now. -monitor Is and Os.    Problem/Plan - 2:  ·  Problem: Obstructive nephropathy.   ·  Plan: -pt noted with likely chronic b/l hydronephrosis and high PVR  -2/2 enlarged prostate and possible bladder mass  -silva placed however as per CT scan tip of the Silva catheter projects beyond the left posterolateral bladder wall margin, possibly within a collapsed diverticulum or external to the bladder  - check cystogram  - urology fu   Problem/Plan - 3:  ·  Problem: Headache.   ·  Plan: -pt reports headache now resolved  -suspect likely tension headache 2/2 worsening uremia, urinary rentention  -CT head no acute pathology.    Problem/Plan - 4:  ·  Problem: Elevated blood pressure reading.   ·  Plan: -noted with SBP 180s, unclear if related to urinary retention or pain however pt also with subconjunctival hemorrhage of left eye  -monitor BP closely and will need to start medications if continues to be elevated.    Problem/Plan - 5:  ·  Problem: Chronic sinusitis.   ·  Plan: -incidential finding on CT head of: near-complete opacification of left sphenoid sinus with internal increased density may relate to fungal type infection/chronic sinusitis with obstruction of the sphenoethmoidal recess  - outpt fu     Problem/Plan - 6:  ·  Problem: Need for prophylactic measure.   ·  Plan: DVT ppx: SCDs given hematuria.

## 2024-07-09 NOTE — PROGRESS NOTE ADULT - ASSESSMENT
63-year-old male with no pertinent past medical history presents with urinary incontinence x 2 months. CT findings with bilateral hydronephrosis, left duplicated system and demonstrating tip of the silva catheter projects beyond the left posterolateral bladder wall margin, possibly within a collapsed diverticulum or external to the bladder.  nephrology consulted for yg and electrolyte abnormalities     YG  no hx of ckd per patient  YG likely sec to obstructive uropathy. scr improving.   continue ivf sodium bicarb gtt  silva inplace. nonoliguric   monitor bmp, urine output  f/u     acidosis  mix-AG+non-AG  likely sec to uremia and obstructive uropathy  continue bicarb gtt  monitor    hyperkalemia  sec to yg and acidosis  ivf as above  Serum K  improving  s/p treatment  low K diet  monitor close    severe b/l hydro  f/u gu  silva in place

## 2024-07-09 NOTE — CHART NOTE - NSCHARTNOTEFT_GEN_A_CORE
63-year-old male with no pertinent past medical history presents with urinary incontinence x 2 months. CT findings with bilateral hydronephrosis, left duplicated system and demonstrating tip of the silva catheter projects beyond the left posterolateral bladder wall margin, possibly within a collapsed diverticulum or external to the bladder. Correlate with Silva catheter function. Consider revision of position as clinically warranted. Has experienced intermittent episodes of difficulty urinating and weak stream for the past few weeks. No previous episodes. No history of urological interventions, malignancy. Has not seen urologist in past. During ER course 16 [place] placed by ED staff w/ 3100 ml output. Found to have Cr 5.25, K+ 6.2, UA unremarkable and PH of 7.26. CT cystogram as below:    BLADDER: Diffuse bladder wall thickening with circumferential   trabeculated appearance. Silva catheter in place. Tip appears to be   within small bladder diverticulum. There is debris in this area   surrounding the Silva tip, possibly small blood products.  No extravasation to suggest bladder perforation.    Plan:  - No further urologic intervention at this time  - No indication for intervention for hematuria, patient refusing irrigations   - Trend cr, improving   - Minimize opioids anticholinergics and antihistamines as tolerated  - Monitor for post obstructive diuresis (urine output >200/hr for 3 hours)  - Continue flomax 0.4mg and proscar 5mg, ensure patient has Rx's on discharge  - Continue silva at discharge  - Follow up with urology as an outpatient for TOV and cystoscopy    Discussed with Dr. Bianchi
Spoke with urology regarding plan of care regarding ongoing hematuria with small clots. Per discussion, urology offered to flush silva catheter at bedside, however pt refused. Pt is AxOx4 and has capacity to refuse treatment. Urology to sign off.     Gloria Chaudhari PA-C  Department of Medicine   In-house pager #[phone]

## 2024-07-10 LAB
ANION GAP SERPL CALC-SCNC: 13 MMOL/L — SIGNIFICANT CHANGE UP (ref 7–14)
BUN SERPL-MCNC: 55 MG/DL — HIGH (ref 7–23)
CALCIUM SERPL-MCNC: 8.4 MG/DL — SIGNIFICANT CHANGE UP (ref 8.4–10.5)
CHLORIDE SERPL-SCNC: 102 MMOL/L — SIGNIFICANT CHANGE UP (ref 98–107)
CO2 SERPL-SCNC: 28 MMOL/L — SIGNIFICANT CHANGE UP (ref 22–31)
CREAT SERPL-MCNC: 3.27 MG/DL — HIGH (ref 0.5–1.3)
EGFR: 20 ML/MIN/1.73M2 — LOW
GLUCOSE SERPL-MCNC: 90 MG/DL — SIGNIFICANT CHANGE UP (ref 70–99)
HCT VFR BLD CALC: 26.1 % — LOW (ref 39–50)
HGB BLD-MCNC: 8.5 G/DL — LOW (ref 13–17)
MAGNESIUM SERPL-MCNC: 1.6 MG/DL — SIGNIFICANT CHANGE UP (ref 1.6–2.6)
MCHC RBC-ENTMCNC: 31.4 PG — SIGNIFICANT CHANGE UP (ref 27–34)
MCHC RBC-ENTMCNC: 32.6 GM/DL — SIGNIFICANT CHANGE UP (ref 32–36)
MCV RBC AUTO: 96.3 FL — SIGNIFICANT CHANGE UP (ref 80–100)
MRSA PCR RESULT.: SIGNIFICANT CHANGE UP
NRBC # BLD: 0 /100 WBCS — SIGNIFICANT CHANGE UP (ref 0–0)
NRBC # FLD: 0 K/UL — SIGNIFICANT CHANGE UP (ref 0–0)
PHOSPHATE SERPL-MCNC: 3.8 MG/DL — SIGNIFICANT CHANGE UP (ref 2.5–4.5)
PLATELET # BLD AUTO: 125 K/UL — LOW (ref 150–400)
POTASSIUM SERPL-MCNC: 3.7 MMOL/L — SIGNIFICANT CHANGE UP (ref 3.5–5.3)
POTASSIUM SERPL-SCNC: 3.7 MMOL/L — SIGNIFICANT CHANGE UP (ref 3.5–5.3)
RBC # BLD: 2.71 M/UL — LOW (ref 4.2–5.8)
RBC # FLD: 13.3 % — SIGNIFICANT CHANGE UP (ref 10.3–14.5)
S AUREUS DNA NOSE QL NAA+PROBE: SIGNIFICANT CHANGE UP
SODIUM SERPL-SCNC: 143 MMOL/L — SIGNIFICANT CHANGE UP (ref 135–145)
WBC # BLD: 4.35 K/UL — SIGNIFICANT CHANGE UP (ref 3.8–10.5)
WBC # FLD AUTO: 4.35 K/UL — SIGNIFICANT CHANGE UP (ref 3.8–10.5)

## 2024-07-10 RX ORDER — SODIUM CHLORIDE 0.9 % (FLUSH) 0.9 %
1000 SYRINGE (ML) INJECTION
Refills: 0 | Status: DISCONTINUED | OUTPATIENT
Start: 2024-07-10 | End: 2024-07-13

## 2024-07-10 RX ADMIN — Medication 1 APPLICATION(S): at 11:01

## 2024-07-10 RX ADMIN — SODIUM BICARBONATE 125 MEQ/KG/HR: 650 TABLET ORAL at 01:10

## 2024-07-10 RX ADMIN — Medication 125 MILLILITER(S): at 10:15

## 2024-07-10 RX ADMIN — Medication 125 MILLILITER(S): at 23:48

## 2024-07-10 NOTE — PROGRESS NOTE ADULT - ASSESSMENT
63-year-old male with no pertinent past medical history presents with urinary incontinence x 2 months and poor urinary stream w/ suprapubic pain found w/ YG on suspected CKD 2/2 obstructive nephropathy 2/2 suspected bladder mass      Problem/Plan - 1:  ·  Problem: Acute kidney injury superimposed on CKD.   ·  Plan: -Pt p/w Cr 5.25. Cr one month ago was 3.85. Also noted with hyperkalemia and metabolic acidosis 2/2 YG. In the setting of b/l hydronephrosis and distended bladder c/f obstructive process  -s/p silva placement in ED with 5.6L outpt since presentation. Bloody urine but no clots noted.    -monitor Is and Os.    Problem/Plan - 2:  ·  Problem: Obstructive nephropathy. ·  Plan: -pt noted with likely chronic b/l hydronephrosis and high PVR  -2/2 enlarged prostate and possible bladder mass  -silva placed however as per CT scan tip of the Silva catheter projects beyond the left posterolateral bladder wall margin, possibly within a collapsed diverticulum or external to the bladder  - urology fu     Problem/Plan - 3:  ·  Problem: Headache.   ·  Plan: -pt reports headache now resolved  -suspect likely tension headache 2/2 worsening uremia, urinary rentention  -CT head no acute pathology.    Problem/Plan - 4:  ·  Problem: Elevated blood pressure reading.   ·  Plan: -noted with SBP 180s, unclear if related to urinary retention or pain however pt also with subconjunctival hemorrhage of left eye  -monitor BP closely and will need to start medications if continues to be elevated.    Problem/Plan - 5:  ·  Problem: Chronic sinusitis.   ·  Plan: -incidential finding on CT head of: near-complete opacification of left sphenoid sinus with internal increased density may relate to fungal type infection/chronic sinusitis with obstruction of the sphenoethmoidal recess  - outpt fu     Problem/Plan - 6:  ·  Problem: Need for prophylactic measure.   ·  Plan: DVT ppx: SCDs given hematuria.

## 2024-07-10 NOTE — PROGRESS NOTE ADULT - ASSESSMENT
64 y/o M PMhx w/ no pertinent past medical history who presented w/ urinary incontinence and suprapubic pain    YG, hydronephrosis, urinary retention  afebrile, no leukocytosis  no dysuria, flank pain  s/p silva placement  UA and urine culture  CT- Severe bilateral hydroureteronephrosis. Presence or absence of bladder mass is not adequately evaluated. Tip of the Silva catheter projects beyond the left posterolateral bladder wall margin, possibly within a collapsed diverticulum or external to the bladder.   CT urogram- Enlarged prostate. No evidence of bladder perforation. Diffuse bladder wall thickening with circumferential trabeculated appearance, likely in the setting of chronic bladder outlet obstruction.     Recommendations  continue off antibiotics  trend creatinine  silva care  f/u nephrology and urology recs    Alessandro Loaiza M.D.  [facility], Division of Infectious Diseases  [phone]  After 5pm on weekdays and all day on weekends - please call [phone]

## 2024-07-10 NOTE — PROGRESS NOTE ADULT - ASSESSMENT
63-year-old male with no pertinent past medical history presents with urinary incontinence x 2 months. CT findings with bilateral hydronephrosis, left duplicated system and demonstrating tip of the silva catheter projects beyond the left posterolateral bladder wall margin, possibly within a collapsed diverticulum or external to the bladder.  nephrology consulted for yg and electrolyte abnormalities     YG  no hx of ckd per patient  GY likely sec to obstructive uropathy. scr improving.   on sodium bicarb gtt can change to NS IV given improved bicarb  silva inplace. nonoliguric   monitor bmp, urine output  repeat CT with improving hydro. cleared from renal for dc. pt to follow up with Dr Agustin in 1-2 wks  dc on oral sodium bicarb 650 tid.  continue IVF while patient in hospital  f/u     acidosis  mix-AG+non-AG  likely sec to uremia and obstructive uropathy  improved. dc bicarb gtt  po bicarb on dc  monitor    hyperkalemia  sec to yg and acidosis  ivf as above  Serum K  improving  s/p treatment  low K diet  monitor close    severe b/l hydro  f/u gu  silva in place  ct shows improvment

## 2024-07-11 RX ORDER — SODIUM BICARBONATE 650 MG/1
650 TABLET ORAL THREE TIMES A DAY
Refills: 0 | Status: DISCONTINUED | OUTPATIENT
Start: 2024-07-11 | End: 2024-07-16

## 2024-07-11 RX ADMIN — SODIUM BICARBONATE 650 MILLIGRAM(S): 650 TABLET ORAL at 13:08

## 2024-07-11 RX ADMIN — SODIUM BICARBONATE 650 MILLIGRAM(S): 650 TABLET ORAL at 22:25

## 2024-07-11 NOTE — PROGRESS NOTE ADULT - ASSESSMENT
63-year-old male with no pertinent past medical history presents with urinary incontinence x 2 months and poor urinary stream w/ suprapubic pain found w/ YG on suspected CKD 2/2 obstructive nephropathy 2/2 suspected bladder mass      Problem/Plan - 1:  ·  Problem: Acute kidney injury superimposed on CKD.   ·  Plan: -Pt p/w Cr 5.25. Cr one month ago was 3.85. Also noted with hyperkalemia and metabolic acidosis 2/2 YG. In the setting of b/l hydronephrosis and distended bladder c/f obstructive process  -s/p silva placement in ED with 5.6L outpt since presentation. Bloody urine but no clots noted.    -monitor Is and Os.    Problem/Plan - 2:·  Problem: Obstructive nephropathy. ·  Plan: -pt noted with likely chronic b/l hydronephrosis and high PVR  -2/2 enlarged prostate and possible bladder mass  -silva placed however as per CT scan tip of the Silva catheter projects beyond the left posterolateral bladder wall margin, possibly within a collapsed diverticulum or external to the bladder  - urology fu   - maintain Silva till hematuria resolves     Problem/Plan - 3:  ·  Problem: Headache.   ·  Plan: -pt reports headache now resolved  -suspect likely tension headache 2/2 worsening uremia, urinary rentention  -CT head no acute pathology.    Problem/Plan - 4:  ·  Problem: Elevated blood pressure reading.   ·  Plan: -noted with SBP 180s, unclear if related to urinary retention or pain however pt also with subconjunctival hemorrhage of left eye  -monitor BP closely and will need to start medications if continues to be elevated.    Problem/Plan - 5:  ·  Problem: Chronic sinusitis.   ·  Plan: -incidential finding on CT head of: near-complete opacification of left sphenoid sinus with internal increased density may relate to fungal type infection/chronic sinusitis with obstruction of the sphenoethmoidal recess  - outpt fu     Problem/Plan - 6:  ·  Problem: Need for prophylactic measure.   ·  Plan: DVT ppx: SCDs given hematuria.

## 2024-07-11 NOTE — PROGRESS NOTE ADULT - ASSESSMENT
62 y/o M PMhx w/ no pertinent past medical history who presented w/ urinary incontinence and suprapubic pain    YG, hydronephrosis, urinary retention  afebrile, no leukocytosis  no dysuria, flank pain  s/p silva placement  UA and urine culture  CT- Severe bilateral hydroureteronephrosis. Presence or absence of bladder mass is not adequately evaluated. Tip of the Silva catheter projects beyond the left posterolateral bladder wall margin, possibly within a collapsed diverticulum or external to the bladder.   CT urogram- Enlarged prostate. No evidence of bladder perforation. Diffuse bladder wall thickening with circumferential trabeculated appearance, likely in the setting of chronic bladder outlet obstruction.   hematuria resolved    Recommendations  continue off antibiotics  silva care  rest of care per nephrology and urology recs    Alessandro Loaiza M.D.  [facility], Division of Infectious Diseases  [phone]  After 5pm on weekdays and all day on weekends - please call [phone]

## 2024-07-11 NOTE — PROGRESS NOTE ADULT - ASSESSMENT
63-year-old male with no pertinent past medical history presents with urinary incontinence x 2 months. CT findings with bilateral hydronephrosis, left duplicated system and demonstrating tip of the silva catheter projects beyond the left posterolateral bladder wall margin, possibly within a collapsed diverticulum or external to the bladder.  nephrology consulted for yg and electrolyte abnormalities     YG  no hx of ckd per patient  YG likely sec to obstructive uropathy. scr improving.   on NS f/u lab today  silva inplace. nonoliguric   monitor bmp, urine output  repeat CT with improving hydro. cleared from renal for dc. pt to follow up with Dr Agustin in 1-2 wks  dc on oral sodium bicarb 650 tid.  continue IVF while patient in hospital  f/u     acidosis  mix-AG+non-AG  likely sec to uremia and obstructive uropathy  improved. dc bicarb gtt  po bicarb 650 tid on dc  monitor    hyperkalemia  sec to yg and acidosis  ivf as above  Serum K  improving  s/p treatment  low K diet  monitor close    severe b/l hydro  f/u gu  silva in place  ct shows improvment

## 2024-07-11 NOTE — PATIENT PROFILE ADULT - STATED REASON FOR ADMISSION
63-year-old male with no pertinent past medical history presents with urinary incontinence x 2 months.  ultrasound showed "a lobulated solid mass in the posterior wall of the urinary bladder...raising possibility of a neoplastic polyp.

## 2024-07-11 NOTE — PATIENT PROFILE ADULT - DO YOU EVER NEED HELP READING HOSPITAL MATERIALS?
Daily Note     Today's date: 2023  Patient name: Aggie Travis  : 2021  MRN: [id number]  Referring provider: Corry Jimenez PA-C  Dx:   Encounter Diagnosis     ICD-10-CM    1. In-toeing of both feet  M20.5X1     M20.5X2       2. Torticollis  M43.6                      Subjective:  Jessica Sawant arrived with her mother and sister to physical therapy today, with family remaining on the pool deck for the session. Jessica Sawant again had significantly improved tolerance to completing her home exercises this past week. Mother does not have any concerns with Radha's current gross motor skills. Objective:  Wearing Puddle Jumper throughout session  - Independent swimming through pool  - Use of portable stand for:   - Single-leg stance on each leg: Right 8 seconds, left 5 seconds   - Single leg stance to lift ring on ankle to pool water surface without hand support   - Manual stretching into right cervical rotation and left lateral flexion   - Active right cervical rotation with left shoulder blocked  - Tailor sitting on blue floatation mat (left leg over right) while manipulating squirt gun  - Single-leg hopping trials on pool deck each leg: Bilateral 1-2 hops   - Followed by DL forward jumping into shallow end with 2 hands held  - Pool stairs with use of one railing, verbal cues for reciprocal pattern    Assessment: Jessica Sawant tolerated her second pool therapy session very well. She showed improvements in her single leg balance, specifically on her right leg, which is typically the harder leg for her to balance on. Jessica Sawant demonstrated carryover of single leg hopping, and mother reports that she is able to complete an increased number of consecutive repetitions at home, closer to 3-4 reps per leg. Jessica Sawant showed greater independence in swimming today, and also greater ability to transition between prone and supine independently.   In regards to her torticollis, Jessica Sawant was able to be stretched to 85-90 degrees of cervical rotation over each shoulder. She also reached full left lateral flexion range passively. She continues to have deficits in strength to rotate and turn over her right shoulder, today reaching about 70 degrees of right cervical rotation. Therapist and mother discussed Radha's current progress and plans to continue home exercise program to meet remaining areas of deficit. Plan:  Continue per plan of care. Kennedi Duran is scheduled to return to therapy for a follow-up in 2-3 months, given her improved tolerance to home exercise program.  Family does not have eminent concerns of Radha's gross motor skills. Therapist emailed home exercise program to family following today's visit. Family was instructed to reach out if any new concerns arise prior to follow-up. no

## 2024-07-12 RX ADMIN — Medication 1 APPLICATION(S): at 13:07

## 2024-07-12 RX ADMIN — Medication 125 MILLILITER(S): at 22:58

## 2024-07-12 NOTE — PROGRESS NOTE ADULT - ASSESSMENT
64 y/o M PMhx w/ no pertinent past medical history who presented w/ urinary incontinence and suprapubic pain    YG, hydronephrosis, urinary retention, hematuria  afebrile, no leukocytosis  no dysuria, flank pain  s/p silva placement  UA and urine culture negative  CT- Severe bilateral hydroureteronephrosis. Presence or absence of bladder mass is not adequately evaluated. Tip of the Silva catheter projects beyond the left posterolateral bladder wall margin, possibly within a collapsed diverticulum or external to the bladder.   CT urogram- Enlarged prostate. No evidence of bladder perforation. Diffuse bladder wall thickening with circumferential trabeculated appearance, likely in the setting of chronic bladder outlet obstruction.     Recommendations  continue off antibiotics  silva care  rest of care per nephrology and urology recs    We will sign off. Thank you for allowing us to participate in the care of Mr. Love. Please feel free to call with any questions or concerns.     Alessandro Loaiza M.D.  [facility], Division of Infectious Diseases  [phone]  After 5pm on weekdays and all day on weekends - please call [phone]  62 y/o M PMhx w/ no pertinent past medical history who presented w/ urinary incontinence and suprapubic pain    YG, hydronephrosis, urinary retention, hematuria  afebrile, no leukocytosis  no dysuria, flank pain  s/p silva placement  UA and urine culture negative  CT- Severe bilateral hydroureteronephrosis. Presence or absence of bladder mass is not adequately evaluated. Tip of the Silva catheter projects beyond the left posterolateral bladder wall margin, possibly within a collapsed diverticulum or external to the bladder.   CT urogram- Enlarged prostate. No evidence of bladder perforation. Diffuse bladder wall thickening with circumferential trabeculated appearance, likely in the setting of chronic bladder outlet obstruction.     chronic sinusitis  CT head- near-complete opacification of left sphenoid sinus with internal increased density may relate to fungal type infection/chronic sinusitis with obstruction of the sphenoethmoidal recess.  asymptomatic  no treatment needed    Recommendations  continue off antibiotics  silva care  rest of care per nephrology and urology recs    We will sign off. Thank you for allowing us to participate in the care of Mr. Love. Please feel free to call with any questions or concerns.     Alessandro Loaiza M.D.  [facility], Division of Infectious Diseases  [phone]  After 5pm on weekdays and all day on weekends - please call [phone]

## 2024-07-12 NOTE — PROGRESS NOTE ADULT - ASSESSMENT
63-year-old male with no pertinent past medical history presents with urinary incontinence x 2 months. CT findings with bilateral hydronephrosis, left duplicated system and demonstrating tip of the silva catheter projects beyond the left posterolateral bladder wall margin, possibly within a collapsed diverticulum or external to the bladder.  nephrology consulted for yg and electrolyte abnormalities     YG  no hx of ckd per patient  YG likely sec to obstructive uropathy. scr improving.   on NS f/u lab today  silva inplace. nonoliguric   monitor bmp, urine output  repeat CT with improving hydro. cleared from renal for dc. pt to follow up with Dr Agustin in 1-2 wks  dc on oral sodium bicarb 650 tid.  continue IVF while patient in hospital  f/u     acidosis  mix-AG+non-AG  likely sec to uremia and obstructive uropathy  improved. dc bicarb gtt  po bicarb 650 tid on dc  monitor    hyperkalemia  sec to yg and acidosis  ivf as above  Serum K  improving  s/p treatment  low K diet  monitor close    severe b/l hydro  f/u gu  silva in place  ct shows improvement

## 2024-07-13 LAB
ANION GAP SERPL CALC-SCNC: 13 MMOL/L — SIGNIFICANT CHANGE UP (ref 7–14)
BUN SERPL-MCNC: 42 MG/DL — HIGH (ref 7–23)
CALCIUM SERPL-MCNC: 8.4 MG/DL — SIGNIFICANT CHANGE UP (ref 8.4–10.5)
CHLORIDE SERPL-SCNC: 110 MMOL/L — HIGH (ref 98–107)
CO2 SERPL-SCNC: 14 MMOL/L — LOW (ref 22–31)
CREAT SERPL-MCNC: 2.49 MG/DL — HIGH (ref 0.5–1.3)
EGFR: 28 ML/MIN/1.73M2 — LOW
GLUCOSE SERPL-MCNC: 83 MG/DL — SIGNIFICANT CHANGE UP (ref 70–99)
HCT VFR BLD CALC: 28.5 % — LOW (ref 39–50)
HGB BLD-MCNC: 8.8 G/DL — LOW (ref 13–17)
MAGNESIUM SERPL-MCNC: 1.4 MG/DL — LOW (ref 1.6–2.6)
MCHC RBC-ENTMCNC: 30.9 GM/DL — LOW (ref 32–36)
MCHC RBC-ENTMCNC: 31.3 PG — SIGNIFICANT CHANGE UP (ref 27–34)
MCV RBC AUTO: 101.4 FL — HIGH (ref 80–100)
NRBC # BLD: 0 /100 WBCS — SIGNIFICANT CHANGE UP (ref 0–0)
NRBC # FLD: 0 K/UL — SIGNIFICANT CHANGE UP (ref 0–0)
PHOSPHATE SERPL-MCNC: 2.7 MG/DL — SIGNIFICANT CHANGE UP (ref 2.5–4.5)
PLATELET # BLD AUTO: 125 K/UL — LOW (ref 150–400)
POTASSIUM SERPL-MCNC: 5.2 MMOL/L — SIGNIFICANT CHANGE UP (ref 3.5–5.3)
POTASSIUM SERPL-SCNC: 5.2 MMOL/L — SIGNIFICANT CHANGE UP (ref 3.5–5.3)
RBC # BLD: 2.81 M/UL — LOW (ref 4.2–5.8)
RBC # FLD: 13.3 % — SIGNIFICANT CHANGE UP (ref 10.3–14.5)
SODIUM SERPL-SCNC: 137 MMOL/L — SIGNIFICANT CHANGE UP (ref 135–145)
WBC # BLD: 4.76 K/UL — SIGNIFICANT CHANGE UP (ref 3.8–10.5)
WBC # FLD AUTO: 4.76 K/UL — SIGNIFICANT CHANGE UP (ref 3.8–10.5)

## 2024-07-13 RX ORDER — MAGNESIUM OXIDE 400 MG/1
400 TABLET ORAL
Refills: 0 | Status: COMPLETED | OUTPATIENT
Start: 2024-07-13 | End: 2024-07-14

## 2024-07-13 RX ORDER — SODIUM BICARBONATE 650 MG/1
0.2 TABLET ORAL
Qty: 150 | Refills: 0 | Status: DISCONTINUED | OUTPATIENT
Start: 2024-07-13 | End: 2024-07-16

## 2024-07-13 RX ORDER — MAGNESIUM SULFATE 100 %
1 POWDER (GRAM) MISCELLANEOUS ONCE
Refills: 0 | Status: COMPLETED | OUTPATIENT
Start: 2024-07-13 | End: 2024-07-13

## 2024-07-13 RX ADMIN — Medication 1 APPLICATION(S): at 13:22

## 2024-07-13 RX ADMIN — SODIUM BICARBONATE 100 MEQ/KG/HR: 650 TABLET ORAL at 15:14

## 2024-07-13 RX ADMIN — Medication 125 MILLILITER(S): at 06:56

## 2024-07-13 RX ADMIN — Medication 100 GRAM(S): at 15:14

## 2024-07-13 RX ADMIN — SODIUM BICARBONATE 100 MEQ/KG/HR: 650 TABLET ORAL at 20:20

## 2024-07-13 RX ADMIN — Medication 125 MILLILITER(S): at 13:18

## 2024-07-13 RX ADMIN — MAGNESIUM OXIDE 400 MILLIGRAM(S): 400 TABLET ORAL at 18:35

## 2024-07-13 NOTE — PROGRESS NOTE ADULT - ASSESSMENT
63-year-old male with no pertinent past medical history presents with urinary incontinence x 2 months. CT findings with bilateral hydronephrosis, left duplicated system and demonstrating tip of the silva catheter projects beyond the left posterolateral bladder wall margin, possibly within a collapsed diverticulum or external to the bladder.  nephrology consulted for yg and electrolyte abnormalities     YG  no hx of ckd per patient  YG likely sec to obstructive uropathy. scr improving.   on NS f/u lab today  silva inplace. nonoliguric   monitor bmp, urine output  repeat CT with improving hydro.   continue IVF while patient in hospital  f/u     acidosis  mix-AG+non-AG  likely sec to uremia and obstructive uropathy  on po bicarb 650 tid    acidosis-worsened  change IVF NaHCO3 15meq/l-100cc/hr  monitor    hyperkalemia  sec to yg and acidosis  ivf as above  Serum K  improving  s/p treatment  low K diet  monitor close    severe b/l hydro  f/u gu  silva in place  ct shows improvement    63-year-old male with no pertinent past medical history presents with urinary incontinence x 2 months. CT findings with bilateral hydronephrosis, left duplicated system and demonstrating tip of the silva catheter projects beyond the left posterolateral bladder wall margin, possibly within a collapsed diverticulum or external to the bladder.  nephrology consulted for yg and electrolyte abnormalities     YG  no hx of ckd per patient  YG likely sec to obstructive uropathy. scr improving.   on NS f/u lab today  silva inplace. nonoliguric   monitor bmp, urine output  repeat CT with improving hydro.   continue IVF while patient in hospital  f/u     acidosis  mix-AG+non-AG  likely sec to uremia and obstructive uropathy  on po bicarb 650 tid    acidosis-worsened  change IVF NaHCO3 15meq/l-100cc/hr  monitor    hyperkalemia  sec to yg and acidosis  ivf as above  Serum K  improving  s/p treatment  low K diet  monitor close    Hypomagnesemia:  supplement MgSo4 2gm IV one dose    severe b/l hydro  f/u gu  silva in place  ct shows improvement    63-year-old male with no pertinent past medical history presents with urinary incontinence x 2 months. CT findings with bilateral hydronephrosis, left duplicated system and demonstrating tip of the silva catheter projects beyond the left posterolateral bladder wall margin, possibly within a collapsed diverticulum or external to the bladder.  nephrology consulted for yg and electrolyte abnormalities     YG  no hx of ckd per patient  YG likely sec to obstructive uropathy. scr improving.   silva inplace. nonoliguric   monitor bmp, urine output  repeat CT with improving hydro.   continue IVF-change to bicarb as below  f/u     acidosis  mix-AG+non-AG  likely sec to uremia and obstructive uropathy  on po bicarb 650 tid    acidosis-worsened  change IVF NaHCO3 15meq/l-100cc/hr  monitor    hyperkalemia  sec to yg and acidosis  ivf as above  Serum K  improving  s/p treatment  low K diet  monitor close    Hypomagnesemia:  supplement MgSo4 1gm IV one dose    severe b/l hydro  f/u gu  silva in place  ct shows improvement

## 2024-07-14 LAB — SARS-COV-2 RNA SPEC QL NAA+PROBE: SIGNIFICANT CHANGE UP

## 2024-07-14 RX ADMIN — MAGNESIUM OXIDE 400 MILLIGRAM(S): 400 TABLET ORAL at 12:07

## 2024-07-14 RX ADMIN — MAGNESIUM OXIDE 400 MILLIGRAM(S): 400 TABLET ORAL at 08:50

## 2024-07-14 RX ADMIN — Medication 1 APPLICATION(S): at 12:09

## 2024-07-14 NOTE — DIETITIAN INITIAL EVALUATION ADULT - REASON FOR ADMISSION
63-year-old male with no pertinent past medical history presents with urinary incontinence x 2 months and poor urinary stream w/ suprapubic pain found w/ YG on suspected CKD 2/2 obstructive nephropathy 2/2 suspected bladder mass per chart

## 2024-07-14 NOTE — DIETITIAN INITIAL EVALUATION ADULT - OTHER INFO
Pt initially noted with hyperkalemia. Currently noted resolved per updated labs. GFR improving since admit. Pt initially noted with hyperkalemia. Currently noted resolved per updated labs. GFR improving since admit. Pt with mild to severe depletions per NFPE findings suggestive of chronic etiology however unable to confirm further criteria for mln dx at this time based on current assessment information. Pt at nutritional risk.

## 2024-07-14 NOTE — DIETITIAN INITIAL EVALUATION ADULT - ORAL INTAKE PTA/DIET HISTORY
As pet patient, intake is reported to be good at home usually completes meals. Denied any concerns for N/V/D or abdominal pain prior to admission. Reports NKFA. Denied chewing or swallowing difficulty. Reported UBW is 170 lbs (77.2 kg) prior to admit. As per chart pt is 164 lbs/74.8 kg reflects -3.5% wt loss over 1 week based on reported UBW. No recent HIE wts to assess wt trend.

## 2024-07-14 NOTE — DIETITIAN INITIAL EVALUATION ADULT - EDUCATION DIETARY MODIFICATIONS
Explained initial hyperkalemia and indication for renal diet in setting of YG. Answered questions. Encouraged general healthful diet, increasing protein rich foods, F/V for micronutrient needs./(2) meets goals/outcomes/verbalization

## 2024-07-14 NOTE — PROGRESS NOTE ADULT - ASSESSMENT
63-year-old male with no pertinent past medical history presents with urinary incontinence x 2 months and poor urinary stream w/ suprapubic pain found w/ YG on suspected CKD 2/2 obstructive nephropathy 2/2 suspected bladder mass      Problem/Plan - 1:  ·  Problem: Acute kidney injury superimposed on CKD.   ·  Plan: - improving cr  - renal fu   -s/p silva placement in ED    -monitor Is and Os.    Problem/Plan - 2:·  Problem: Obstructive nephropathy. ·  Plan: -pt noted with likely chronic b/l hydronephrosis and high PVR  -2/2 enlarged prostate and possible bladder mass  -silva placed however as per CT scan tip of the Silva catheter projects beyond the left posterolateral bladder wall margin, possibly within a collapsed diverticulum or external to the bladder  - urology fu   - maintain Silva till hematuria resolves     Problem/Plan - 3:  ·  Problem: Headache.   ·  Plan: -pt reports headache now resolved  -suspect likely tension headache 2/2 worsening uremia, urinary rentention  -CT head no acute pathology.    Problem/Plan - 4:  ·  Problem: Elevated blood pressure reading.   ·  Plan: -noted with SBP 180s, unclear if related to urinary retention or pain however pt also with subconjunctival hemorrhage of left eye  -monitor BP closely and will need to start medications if continues to be elevated.    Problem/Plan - 5:  ·  Problem: Chronic sinusitis.   ·  Plan: -incidential finding on CT head of: near-complete opacification of left sphenoid sinus with internal increased density may relate to fungal type infection/chronic sinusitis with obstruction of the sphenoethmoidal recess  - outpt fu   Problem/Plan - 6:  ·  Problem: Need for prophylactic measure.   ·  Plan: DVT ppx: SCDs given hematuria.

## 2024-07-14 NOTE — DIETITIAN INITIAL EVALUATION ADULT - PROBLEM SELECTOR PLAN 1
-Pt p/w Cr 5.25. Cr one month ago was 3.85. Also noted with hyperkalemia and metabolic acidosis 2/2 YG. In the setting of b/l hydronephrosis and distended bladder c/f obstructive process  -s/p silva placement in ED with 5.6L outpt since presentation. Bloody urine but no clots noted. Hematuria thought to be 2/2 decompression  -start on IVF given post obstructive diuresis, encourage pt to drink to thirst   -treat mild hyperkalemia w/ lokelma  -trend BMP q6h for now. F/u repeat VBG  -monitor Is and Os

## 2024-07-14 NOTE — DIETITIAN INITIAL EVALUATION ADULT - ENERGY INTAKE
As per patient pt is eating 100% of meals in the hospital enjoys meals provided. Recent RN flowsheet documentation noted % intake at meal. Denied current concerns for N/V/D or abdominal pain or other factors affecting appetite or intake. Pt however observed with physical depletions per NFPE. Acceptable to receive Orgain ONS to support nutritional needs.  Adequate (%) As per patient pt is eating 100% of meals in the hospital enjoys meals provided. Recent RN flowsheet documentation noted % intake at meal. Denied current concerns for N/V/D or abdominal pain or other factors affecting appetite or intake.  Acceptable to receive Orgain ONS to support nutritional needs. Additional preferences discussed.

## 2024-07-14 NOTE — DIETITIAN INITIAL EVALUATION ADULT - PROBLEM SELECTOR PLAN 3
-pt reports headache now resolved  -suspect likely tension headache 2/2 worsening uremia, urinary rentention  -CT head no acute pathology

## 2024-07-14 NOTE — DIETITIAN INITIAL EVALUATION ADULT - PERTINENT MEDS FT
MEDICATIONS  (STANDING):  chlorhexidine 2% Cloths 1 Application(s) Topical daily  finasteride 5 milliGRAM(s) Oral daily  sodium bicarbonate 650 milliGRAM(s) Oral three times a day  sodium bicarbonate  Infusion 0.201 mEq/kG/Hr (100 mL/Hr) IV Continuous <Continuous>  tamsulosin 0.4 milliGRAM(s) Oral at bedtime    MEDICATIONS  (PRN):  acetaminophen     Tablet .. 650 milliGRAM(s) Oral every 6 hours PRN Temp greater or equal to 38C (100.4F), Mild Pain (1 - 3)  aluminum hydroxide/magnesium hydroxide/simethicone Suspension 30 milliLiter(s) Oral every 4 hours PRN Dyspepsia  melatonin 3 milliGRAM(s) Oral at bedtime PRN Insomnia  ondansetron Injectable 4 milliGRAM(s) IV Push every 8 hours PRN Nausea and/or Vomiting  sodium chloride 0.65% Nasal 1 Spray(s) Both Nostrils two times a day PRN Nasal Congestion

## 2024-07-14 NOTE — DIETITIAN INITIAL EVALUATION ADULT - PROBLEM SELECTOR PLAN 4
Follow up with PMD in 1-2 days. -noted with SBP 180s, unclear if related to urinary retention or pain however pt also with subconjunctival hemorrhage of left eye  -monitor BP closely and will need to start medications if continues to be elevated

## 2024-07-14 NOTE — PROGRESS NOTE ADULT - ASSESSMENT
63-year-old male with no pertinent past medical history presents with urinary incontinence x 2 months. CT findings with bilateral hydronephrosis, left duplicated system and demonstrating tip of the silva catheter projects beyond the left posterolateral bladder wall margin, possibly within a collapsed diverticulum or external to the bladder.  nephrology consulted for yg and electrolyte abnormalities     YG  no hx of ckd per patient  YG likely sec to obstructive uropathy. scr improving.   silva in place. non oliguric   monitor bmp, urine output (no labs today)  repeat CT with improving hydro.   continue IV bicarb   f/u     acidosis  mix-AG+non-AG  likely sec to uremia and obstructive uropathy  on po bicarb 650 tid and IVF NaHCO3 15meq/l-100cc/hr  acidosis-worsened. No labs today  monitor    hyperkalemia  sec to yg and acidosis  ivf as above  Serum K  improving (no labs today)  s/p treatment  low K diet  monitor close    Hypomagnesemia:  supplemented  No labs today  Monitor    Anemia  No labs today  Transfuse to maintain Hb above 8  Monitor Hb      severe b/l hydro  f/u gu  silva in place  ct shows improvement

## 2024-07-15 LAB — SARS-COV-2 RNA SPEC QL NAA+PROBE: SIGNIFICANT CHANGE UP

## 2024-07-15 RX ADMIN — SODIUM BICARBONATE 650 MILLIGRAM(S): 650 TABLET ORAL at 11:38

## 2024-07-15 NOTE — PROVIDER CONTACT NOTE (OTHER) - ASSESSMENT
Pt is A&O x4. He denies chest pain, difficulty breathing and numbness/tingling in hands and feet. No s/s of distress noted
pt states "he does not want morning labs, he has too many holes" RN educated pt on importance of blood work, pt responded "he will stay a couple of months if he has to". pt is refusing AM labs at this time.
Patient admitted with YG and on continuos fluids, patient refusing PO sodium bicarb and Finasteride patient states he no longer takes Finasteride and only wants sodium bicarb in IVPB form
pt A&Ox4, denies nausea, vomiting, dizziness, chest pain
Patient A&Ox4. Patient denies chest pain, shortness of breath, generalized pain, or any other signs of distress. Pt breathing even and unlabored on room air. Pt educated on importance of taking meds, being compliant with blood draws and vital sign monitoring.
Patient A&Ox4.Patient denies chest pain, shortness of breath, generalized pain, or any other signs of distress. Pt breathing even and unlabored on room air. Pt educated on importance of taking meds and being compliant with blood draws.
Pt A&Ox4. VSS. NAD.
pt asymptomatic. no s/s of distress noted.
pt states "he wants his tele off until later". RN educated patient on importance of telemetry monitory. Pt states "he will put it on later".
pt no c/o pain from silva insertion site but urine appears bright red. pt very firm and refusing blood draw.

## 2024-07-15 NOTE — PROVIDER CONTACT NOTE (OTHER) - BACKGROUND
pt admit dx of hyperkalemia. No pertinent past medical history.
63-year-old male with no pertinent past medical history presents with urinary incontinence x 2 months. Admitted with hyperkalemia and YG
63-year-old male with no pertinent past medical history presents with urinary incontinence x 2 months. Admitted with hyperkalemia and YG.
63-year-old male with no pertinent past medical history presents with urinary incontinence x 2 months. Admitted with hyperkalemia and YG
PmHx: Hyperkalemia  (PMH) No pertinent past medical history  (Principal DC/DX) Hyperkalemia  (Problem/DX) Need for prophylactic measure  (Problem/DX) Chronic sinusitis
63 yr old male presenting to ED with nocturnal urinary incontinence for 2 months, no pmhx
pt admit dx of hyperkalemia. no pertinent past medical hx.

## 2024-07-15 NOTE — PROVIDER CONTACT NOTE (OTHER) - DATE AND TIME:
07-Jul-2024 15:55
09-Jul-2024 16:48
15-Jul-2024 21:00
10-Jul-2024 23:29
12-Jul-2024 04:29
12-Jul-2024 07:01
13-Jul-2024 22:00
09-Jul-2024 10:00
13-Jul-2024 13:27
13-Jul-2024 23:15

## 2024-07-15 NOTE — PROGRESS NOTE ADULT - ASSESSMENT
63-year-old male with no pertinent past medical history presents with urinary incontinence x 2 months. CT findings with bilateral hydronephrosis, left duplicated system and demonstrating tip of the silva catheter projects beyond the left posterolateral bladder wall margin, possibly within a collapsed diverticulum or external to the bladder.  nephrology consulted for yg and electrolyte abnormalities     YG  no hx of ckd per patient  YG likely sec to obstructive uropathy. scr improving. (no lab today)  silva in place. non oliguric   monitor bmp, urine output (no labs today)  repeat CT with improving hydro.   on IVF  f/u     acidosis  mix-AG+non-AG  likely sec to uremia and obstructive uropathy  on po bicarb 650 tid and IVF NaHCO3 15meq/l-100cc/hr  acidosis-worsened. No labs today  monitor    hyperkalemia  sec to yg and acidosis  ivf as above  Serum K  improving (no labs today)  s/p treatment  low K diet  monitor close    Hypomagnesemia:  supplemented  No labs today  Monitor    Anemia  No labs today  Transfuse to maintain Hb above 8  Monitor Hb      severe b/l hydro  f/u gu  silva in place  ct shows improvement

## 2024-07-15 NOTE — PROVIDER CONTACT NOTE (OTHER) - ACTION/TREATMENT ORDERED:
Provider made aware.
will relay to day RN to attempt later on in the AM
continue to educated pt on importance of blood draw in relation to bloody urine
provider michelle suero notified and aware
No blood draws or PO meds at this time, continue to monitor.
No blood draws, PO meds, or vital signs at this time, continue to monitor.
Pt's decision respected. No further actions taken at time this.
continue to monitor patient on fluids, discuss whether IV sodium bicarb is necessary or appropriate
none at this time

## 2024-07-15 NOTE — PROVIDER CONTACT NOTE (OTHER) - NAME OF MD/NP/PA/DO NOTIFIED:
YADIEL Gordillo
YADIEL Chaudhari
ACP Bindhu
Moody Muir, NP
YADIEL Alas
YADIEL Walters
michelle suero
YADIEL Chaudhari
Brenden Walters
YADIEL Ramirez

## 2024-07-15 NOTE — PROVIDER CONTACT NOTE (OTHER) - REASON
Pt refusing all medications including continuous fluids.
pt bp 183/109
pt is refusing AM labs
pt is refusing tele
Pt refusing all PO meds, labs, vitals
patient refusing medications
pt refusing blood draw for BMP
Pt refused flomax
pt silva urine appear brighter red, but pt refusing blood draw
Pt refusing all PO meds and labs

## 2024-07-15 NOTE — PROVIDER CONTACT NOTE (OTHER) - RECOMMENDATIONS
patient educated on indication for sodium bicarb and Finasteride and risk of not taking medications, patient verbalizes he does not need them and only want IV sodium bicarb
YADIEL Chaudhari made aware
will relay to day RN to attempt during the day
Pt's decision respected. No further actions taken at time this.
Provider notified. Education provided.
YADIEL Chaudhari made aware
Provider notified. Education provided.

## 2024-07-15 NOTE — PROVIDER CONTACT NOTE (OTHER) - SITUATION
Pt refusing all PO meds and labs.
Pt is refusing to take all his medications (sodium bicarbonate and tamsulosin) despite explained the benefits and risks associated with this behavior.
Pt refused flomax. Educated pt regarding purpose of medication. Pt verbalized understanding.
pt is AxOx4, no signs of distress
patient refusing PO sodium bicarb, refused it this AM as well, patient refused PO Finasteride as well states "I don't take that"
pt refusing blood draw for BMP
pt silva urine appear brighter red, but pt refusing blood draw
pt bp 183/109
pt is AxOx4, no signs of distress noted
Pt refusing all PO meds, labs, and taking vital signs.

## 2024-07-15 NOTE — PROGRESS NOTE ADULT - ASSESSMENT
63-year-old male with no pertinent past medical history presents with urinary incontinence x 2 months and poor urinary stream w/ suprapubic pain found w/ GY on suspected CKD 2/2 obstructive nephropathy 2/2 suspected bladder mass      Problem/Plan - 1:  ·  Problem: Acute kidney injury superimposed on CKD.   ·  Plan: - improving cr  - renal fu   -s/p silva placement in ED    -monitor Is and Os.    Problem/Plan - 2:·  Problem: Obstructive nephropathy. ·  Plan: -pt noted with likely chronic b/l hydronephrosis and high PVR  -2/2 enlarged prostate and possible bladder mass  -silva placed however as per CT scan tip of the Silva catheter projects beyond the left posterolateral bladder wall margin, possibly within a collapsed diverticulum or external to the bladder  - urology fu   - maintain Silva till hematuria resolves   - urology fu     Problem/Plan - 3:  ·  Problem: Headache.   ·  Plan: -pt reports headache now resolved  -suspect likely tension headache 2/2 worsening uremia, urinary rentention  -CT head no acute pathology.    Problem/Plan - 4:  ·  Problem: Elevated blood pressure reading.   ·  Plan: -noted with SBP 180s, unclear if related to urinary retention or pain however pt also with subconjunctival hemorrhage of left eye  -monitor BP closely and will need to start medications if continues to be elevated.    Problem/Plan - 5:  ·  Problem: Chronic sinusitis.   ·  Plan: -incidential finding on CT head of: near-complete opacification of left sphenoid sinus with internal increased density may relate to fungal type infection/chronic sinusitis with obstruction of the sphenoethmoidal recess  - outpt fu   Problem/Plan - 6:  ·  Problem: Need for prophylactic measure.   ·  Plan: DVT ppx: SCDs given hematuria.

## 2024-07-16 ENCOUNTER — TRANSCRIPTION ENCOUNTER (OUTPATIENT)
Age: 64
End: 2024-07-16

## 2024-07-16 VITALS
TEMPERATURE: 98 F | HEART RATE: 86 BPM | DIASTOLIC BLOOD PRESSURE: 68 MMHG | RESPIRATION RATE: 17 BRPM | SYSTOLIC BLOOD PRESSURE: 102 MMHG | OXYGEN SATURATION: 100 %

## 2024-07-16 LAB
ALBUMIN SERPL ELPH-MCNC: 3.8 G/DL — SIGNIFICANT CHANGE UP (ref 3.3–5)
ALP SERPL-CCNC: 56 U/L — SIGNIFICANT CHANGE UP (ref 40–120)
ALT FLD-CCNC: 12 U/L — SIGNIFICANT CHANGE UP (ref 4–41)
ANION GAP SERPL CALC-SCNC: 15 MMOL/L — HIGH (ref 7–14)
AST SERPL-CCNC: 5 U/L — SIGNIFICANT CHANGE UP (ref 4–40)
BILIRUB SERPL-MCNC: 0.2 MG/DL — SIGNIFICANT CHANGE UP (ref 0.2–1.2)
BUN SERPL-MCNC: 64 MG/DL — HIGH (ref 7–23)
CALCIUM SERPL-MCNC: 9.1 MG/DL — SIGNIFICANT CHANGE UP (ref 8.4–10.5)
CHLORIDE SERPL-SCNC: 104 MMOL/L — SIGNIFICANT CHANGE UP (ref 98–107)
CO2 SERPL-SCNC: 19 MMOL/L — LOW (ref 22–31)
CREAT SERPL-MCNC: 2.8 MG/DL — HIGH (ref 0.5–1.3)
EGFR: 24 ML/MIN/1.73M2 — LOW
GLUCOSE SERPL-MCNC: 98 MG/DL — SIGNIFICANT CHANGE UP (ref 70–99)
HCT VFR BLD CALC: 27.1 % — LOW (ref 39–50)
HGB BLD-MCNC: 8.5 G/DL — LOW (ref 13–17)
MAGNESIUM SERPL-MCNC: 1.8 MG/DL — SIGNIFICANT CHANGE UP (ref 1.6–2.6)
MCHC RBC-ENTMCNC: 30.9 PG — SIGNIFICANT CHANGE UP (ref 27–34)
MCHC RBC-ENTMCNC: 31.4 GM/DL — LOW (ref 32–36)
MCV RBC AUTO: 98.5 FL — SIGNIFICANT CHANGE UP (ref 80–100)
NRBC # BLD: 0 /100 WBCS — SIGNIFICANT CHANGE UP (ref 0–0)
NRBC # FLD: 0 K/UL — SIGNIFICANT CHANGE UP (ref 0–0)
PHOSPHATE SERPL-MCNC: 3 MG/DL — SIGNIFICANT CHANGE UP (ref 2.5–4.5)
PLATELET # BLD AUTO: 131 K/UL — LOW (ref 150–400)
POTASSIUM SERPL-MCNC: 4.6 MMOL/L — SIGNIFICANT CHANGE UP (ref 3.5–5.3)
POTASSIUM SERPL-SCNC: 4.6 MMOL/L — SIGNIFICANT CHANGE UP (ref 3.5–5.3)
PROT SERPL-MCNC: 6.7 G/DL — SIGNIFICANT CHANGE UP (ref 6–8.3)
RBC # BLD: 2.75 M/UL — LOW (ref 4.2–5.8)
RBC # FLD: 13.1 % — SIGNIFICANT CHANGE UP (ref 10.3–14.5)
SODIUM SERPL-SCNC: 138 MMOL/L — SIGNIFICANT CHANGE UP (ref 135–145)
WBC # BLD: 7.27 K/UL — SIGNIFICANT CHANGE UP (ref 3.8–10.5)
WBC # FLD AUTO: 7.27 K/UL — SIGNIFICANT CHANGE UP (ref 3.8–10.5)

## 2024-07-16 RX ORDER — TAMSULOSIN HYDROCHLORIDE 0.4 MG/1
1 CAPSULE ORAL
Qty: 30 | Refills: 0
Start: 2024-07-16 | End: 2024-08-14

## 2024-07-16 RX ORDER — SODIUM BICARBONATE 650 MG/1
1 TABLET ORAL
Qty: 90 | Refills: 0
Start: 2024-07-16 | End: 2024-08-14

## 2024-07-16 RX ORDER — TAMSULOSIN HCL 0.4 MG
1 CAPSULE ORAL
Qty: 30 | Refills: 0 | DISCHARGE
Start: 2024-07-16 | End: 2024-08-14

## 2024-07-16 RX ORDER — FINASTERIDE 5 MG/1
1 TABLET, FILM COATED ORAL
Qty: 30 | Refills: 0 | DISCHARGE
Start: 2024-07-16 | End: 2024-08-14

## 2024-07-16 RX ORDER — SODIUM CHLORIDE 0.65 %
1 AEROSOL, SPRAY (ML) NASAL
Qty: 0 | Refills: 0 | DISCHARGE
Start: 2024-07-16

## 2024-07-16 NOTE — PROGRESS NOTE ADULT - ASSESSMENT
63-year-old male with no pertinent past medical history presents with urinary incontinence x 2 months. CT findings with bilateral hydronephrosis, left duplicated system and demonstrating tip of the silva catheter projects beyond the left posterolateral bladder wall margin, possibly within a collapsed diverticulum or external to the bladder.  nephrology consulted for yg and electrolyte abnormalities     YG  no hx of ckd per patient  YG likely sec to obstructive uropathy. scr was improving with slight bump today. pt has been refusing IVF  possible slight dehydration vs baseline cr.   silva in place. non oliguric   monitor bmp, urine output (no labs today)  repeat CT with improving hydro.   f/u   pt advised to follow up with Dr. Agustin in 2 wks    acidosis  mix-AG+non-AG  likely sec to uremia and obstructive uropathy  on po bicarb 650 tid and IVF NaHCO3 15meq/l-100cc/hr  pt non compliant   monitor    hyperkalemia  sec to yg and acidosis  ivf as above  Serum K  improved  s/p treatment  low K diet  monitor close    Hypomagnesemia:  supplemented  No labs today  Monitor    Anemia  No labs today  Transfuse to maintain Hb above 8  Monitor Hb      severe b/l hydro  f/u gu  silva in place  ct shows improvement    63-year-old male with no pertinent past medical history presents with urinary incontinence x 2 months. CT findings with bilateral hydronephrosis, left duplicated system and demonstrating tip of the silva catheter projects beyond the left posterolateral bladder wall margin, possibly within a collapsed diverticulum or external to the bladder.  nephrology consulted for yg and electrolyte abnormalities     YG  no hx of ckd per patient  YG likely sec to obstructive uropathy. scr was improving with slight bump today. pt has been refusing IVF  possible slight dehydration vs baseline cr.   silva in place. non oliguric   monitor bmp, urine output   repeat CT with improving hydro.   f/u   Continue IVF if patient allows  pt advised to follow up with Dr. Agustin in 2 wks    acidosis  mix-AG+non-AG  likely sec to uremia and obstructive uropathy  on po bicarb 650 tid and IVF NaHCO3 150meq/l-100cc/hr  pt non compliant   monitor    hyperkalemia  sec to yg and acidosis  ivf as above  Serum K  improved  s/p treatment  low K diet  monitor close    Hypomagnesemia:  supplemented  No labs today  Monitor    Anemia  No labs today  Transfuse to maintain Hb above 8  Monitor Hb    severe b/l hydro  f/u gu  silva in place  ct shows improvement

## 2024-07-16 NOTE — PROGRESS NOTE ADULT - SUBJECTIVE AND OBJECTIVE BOX
Dr. Agustin  Office [phone] (9 am to 5 pm)  Service: [phone] (5pm to 9am)  Alvina OSUNA      RENAL PROGRESS NOTE: DATE OF SERVICE 07-13-24 @ 13:58    Patient is a 63y old  Male who presents with a chief complaint of suprapubic pain (12 Jul 2024 14:35)      Patient seen and examined at bedside. No chest pain/sob    VITALS:  T(F): 98.1 (07-13-24 @ 12:11), Max: 98.9 (07-12-24 @ 21:51)  HR: 63 (07-13-24 @ 12:11)  BP: 112/74 (07-13-24 @ 12:11)  RR: 17 (07-13-24 @ 12:11)  SpO2: 100% (07-13-24 @ 12:11)  Wt(kg): --    07-12 @ 07:01  -  07-13 @ 07:00  --------------------------------------------------------  IN: 0 mL / OUT: 3300 mL / NET: -3300 mL    07-13 @ 07:01  -  07-13 @ 13:58  --------------------------------------------------------  IN: 0 mL / OUT: 900 mL / NET: -900 mL          PHYSICAL EXAM:  Constitutional: NAD  Neck: No JVD  Respiratory: CTAB, no wheezes, rales or rhonchi  Cardiovascular: S1, S2, RRR  Gastrointestinal: BS+, soft, NT/ND  Extremities: No peripheral edema    Hospital Medications:   MEDICATIONS  (STANDING):  chlorhexidine 2% Cloths 1 Application(s) Topical daily  finasteride 5 milliGRAM(s) Oral daily  sodium bicarbonate 650 milliGRAM(s) Oral three times a day  sodium chloride 0.9%. 1000 milliLiter(s) (125 mL/Hr) IV Continuous <Continuous>  tamsulosin 0.4 milliGRAM(s) Oral at bedtime      LABS:  07-13    137  |  110<H>  |  42<H>  ----------------------------<  83  5.2   |  14<L>  |  2.49<H>    Ca    8.4      13 Jul 2024 08:10  Phos  2.7     07-13  Mg     1.40     07-13      Creatinine Trend: 2.49 <--, 3.27 <--, 3.71 <--, 4.49 <--, 4.80 <--, 5.28 <--, 5.14 <--, 4.94 <--, 5.25 <--    Phosphorus: 2.7 mg/dL (07-13 @ 08:10)                              8.8    4.76  )-----------( 125      ( 13 Jul 2024 08:10 )             28.5     Urine Studies:  Urinalysis - [07-13-24 @ 08:10]      Color  / Appearance  / SG  / pH       Gluc 83 / Ketone   / Bili  / Urobili        Blood  / Protein  / Leuk Est  / Nitrite       RBC  / WBC  / Hyaline  / Gran  / Sq Epi  / Non Sq Epi  / Bacteria             RADIOLOGY & ADDITIONAL STUDIES:  
[facility] [place] [facility]   MD LASHON GARCIA MD ANGELA WONG, PA    TEL:  OFFICE: [phone]  From 5pm-7am Answering Service [phone]    -- RENAL FOLLOW UP NOTE ---Date of Service 07-16-24 @ 13:36    Patient is a 63y old  Male who presents with a chief complaint of suprapubic pain (15 Jul 2024 16:56)      Patient seen and examined at bedside. No chest pain/sob    VITALS:  T(F): 98.1 (07-16-24 @ 09:40), Max: 98.5 (07-15-24 @ 17:29)  HR: 86 (07-16-24 @ 09:40)  BP: 102/68 (07-16-24 @ 09:40)  RR: 17 (07-16-24 @ 09:40)  SpO2: 100% (07-16-24 @ 09:40)  Wt(kg): --    07-15 @ 07:01  -  07-16 @ 07:00  --------------------------------------------------------  IN: 600 mL / OUT: 1600 mL / NET: -1000 mL    07-16 @ 07:01  -  07-16 @ 13:36  --------------------------------------------------------  IN: 240 mL / OUT: 0 mL / NET: 240 mL          PHYSICAL EXAM:  General: NAD  Neck: No JVD  Respiratory: CTAB, no wheezes, rales or rhonchi  Cardiovascular: S1, S2, RRR  Gastrointestinal: BS+, soft, NT/ND  Extremities: No peripheral edema    Hospital Medications:   MEDICATIONS  (STANDING):  chlorhexidine 2% Cloths 1 Application(s) Topical daily  finasteride 5 milliGRAM(s) Oral daily  sodium bicarbonate 650 milliGRAM(s) Oral three times a day  sodium bicarbonate  Infusion 0.201 mEq/kG/Hr (100 mL/Hr) IV Continuous <Continuous>  tamsulosin 0.4 milliGRAM(s) Oral at bedtime      LABS:  07-16    138  |  104  |  64<H>  ----------------------------<  98  4.6   |  19<L>  |  2.80<H>    Ca    9.1      16 Jul 2024 10:40  Phos  3.0     07-16  Mg     1.80     07-16    TPro  6.7  /  Alb  3.8  /  TBili  0.2  /  DBili      /  AST  5   /  ALT  12  /  AlkPhos  56  07-16    Creatinine Trend: 2.80 <--, 2.49 <--, 3.27 <--    Albumin: 3.8 g/dL (07-16 @ 10:40)  Phosphorus: 3.0 mg/dL (07-16 @ 10:40)                              8.5    7.27  )-----------( 131      ( 16 Jul 2024 10:40 )             27.1     Urine Studies:  Urinalysis - [07-16-24 @ 10:40]      Color  / Appearance  / SG  / pH       Gluc 98 / Ketone   / Bili  / Urobili        Blood  / Protein  / Leuk Est  / Nitrite       RBC  / WBC  / Hyaline  / Gran  / Sq Epi  / Non Sq Epi  / Bacteria             RADIOLOGY & ADDITIONAL STUDIES:  
[facility] [place] [facility]   MD LASHON GARCIA MD ANGELA WONG, PA    TEL:  OFFICE: [phone]  From 5pm-7am Answering Service [phone]    -- RENAL FOLLOW UP NOTE ---Date of Service 07-09-24 @ 14:16    Patient is a 63y old  Male who presents with a chief complaint of suprapubic pain (09 Jul 2024 13:51)      Patient seen and examined at bedside. No chest pain/sob    VITALS:  T(F): 98.2 (07-09-24 @ 14:04), Max: 98.2 (07-09-24 @ 08:09)  HR: 76 (07-09-24 @ 14:04)  BP: 145/71 (07-09-24 @ 14:04)  RR: 18 (07-09-24 @ 14:04)  SpO2: 100% (07-09-24 @ 14:04)  Wt(kg): --    07-08 @ 07:01  -  07-09 @ 07:00  --------------------------------------------------------  IN: 0 mL / OUT: 2800 mL / NET: -2800 mL          PHYSICAL EXAM:  General: NAD  Neck: No JVD  Respiratory: CTAB, no wheezes, rales or rhonchi  Cardiovascular: S1, S2, RRR  Gastrointestinal: BS+, soft, NT/ND   +silva with blood tinged urine  Extremities: No peripheral edema    Hospital Medications:   MEDICATIONS  (STANDING):  chlorhexidine 2% Cloths 1 Application(s) Topical daily  finasteride 5 milliGRAM(s) Oral daily  sodium bicarbonate  Infusion 0.251 mEq/kG/Hr (125 mL/Hr) IV Continuous <Continuous>  tamsulosin 0.4 milliGRAM(s) Oral at bedtime      LABS:  07-09    139  |  104  |  67<H>  ----------------------------<  100<H>  4.8   |  17<L>  |  3.71<H>    Ca    8.4      09 Jul 2024 04:18  Phos  4.3     07-09  Mg     1.70     07-09      Creatinine Trend: 3.71 <--, 4.49 <--, 4.80 <--, 5.28 <--, 5.14 <--, 4.94 <--, 5.25 <--    Phosphorus: 4.3 mg/dL (07-09 @ 04:18)  Phosphorus: TNP mg/dL (07-08 @ 19:21)                              9.2    5.51  )-----------( 145      ( 09 Jul 2024 04:18 )             27.2     Urine Studies:  Urinalysis - [07-09-24 @ 04:18]      Color  / Appearance  / SG  / pH       Gluc 100 / Ketone   / Bili  / Urobili        Blood  / Protein  / Leuk Est  / Nitrite       RBC  / WBC  / Hyaline  / Gran  / Sq Epi  / Non Sq Epi  / Bacteria             RADIOLOGY & ADDITIONAL STUDIES:  
[facility] [place] [facility]   MD LASHON GARCIA MD ANGELA WONG, PA    TEL:  OFFICE: [phone]  From 5pm-7am Answering Service [phone]    -- RENAL FOLLOW UP NOTE ---Date of Service 07-12-24 @ 09:00    Patient is a 63y old  Male who presents with a chief complaint of suprapubic pain (12 Jul 2024 07:37)      Patient seen and examined at bedside. No chest pain/sob    VITALS:  T(F): 98.2 (07-12-24 @ 04:37), Max: 98.3 (07-11-24 @ 21:25)  HR: 63 (07-12-24 @ 04:37)  BP: 138/78 (07-12-24 @ 04:37)  RR: 18 (07-12-24 @ 04:37)  SpO2: 100% (07-12-24 @ 04:37)  Wt(kg): --    07-11 @ 07:01  -  07-12 @ 07:00  --------------------------------------------------------  IN: 1780 mL / OUT: 4500 mL / NET: -2720 mL          PHYSICAL EXAM:  General: NAD  Neck: No JVD  Respiratory: CTAB, no wheezes, rales or rhonchi  Cardiovascular: S1, S2, RRR  Gastrointestinal: BS+, soft, NT/ND  Extremities: No peripheral edema    Hospital Medications:   MEDICATIONS  (STANDING):  chlorhexidine 2% Cloths 1 Application(s) Topical daily  finasteride 5 milliGRAM(s) Oral daily  sodium bicarbonate 650 milliGRAM(s) Oral three times a day  sodium chloride 0.9%. 1000 milliLiter(s) (125 mL/Hr) IV Continuous <Continuous>  tamsulosin 0.4 milliGRAM(s) Oral at bedtime      LABS:        Creatinine Trend: 3.27 <--, 3.71 <--, 4.49 <--, 4.80 <--, 5.28 <--, 5.14 <--, 4.94 <--, 5.25 <--            Urine Studies:  Urinalysis - [07-10-24 @ 06:17]      Color  / Appearance  / SG  / pH       Gluc 90 / Ketone   / Bili  / Urobili        Blood  / Protein  / Leuk Est  / Nitrite       RBC  / WBC  / Hyaline  / Gran  / Sq Epi  / Non Sq Epi  / Bacteria             RADIOLOGY & ADDITIONAL STUDIES:  
[facility], Division of Infectious Diseases  DOMINGO Torre Y. Patel, S. Shah, G. Josse  [phone]  ([phone] - weekdays after 5pm and weekends)    Name: CRYSTAL SIEGEL  Age/Gender: 63y Male  MRN: [id number]    Interval History:  Notes reviewed.   No concerning overnight events.  Afebrile.   denies dysuria, flank pain  states he does not want to take any medications when he leaves and wants to have silva removed    Allergies: No Known Allergies      Objective:  Vitals:   T(F): 98.6 (07-11-24 @ 06:22), Max: 98.6 (07-11-24 @ 06:22)  HR: 76 (07-11-24 @ 06:22) (72 - 83)  BP: 134/86 (07-11-24 @ 06:22) (104/63 - 140/75)  RR: 18 (07-11-24 @ 06:22) (18 - 18)  SpO2: 100% (07-11-24 @ 06:22) (97% - 100%)  Physical Examination:  General: no acute distress  HEENT: anicteric  Cardio: normal rate  Resp: breathing comfortably on RA   Abd: soft, NT, ND  : silva   Ext: no LE edema  Skin: warm, dry    Laboratory Studies:  CBC:                       8.5    4.35  )-----------( 125      ( 10 Jul 2024 06:17 )             26.1     WBC Trend:  4.35 07-10-24 @ 06:17  5.51 07-09-24 @ 04:18  6.93 07-08-24 @ 07:05  5.21 07-07-24 @ 09:15    CMP: 07-10    143  |  102  |  55<H>  ----------------------------<  90  3.7   |  28  |  3.27<H>    Ca    8.4      10 Jul 2024 06:17  Phos  3.8     07-10  Mg     1.60     07-10            Urinalysis Basic - ( 10 Jul 2024 06:17 )    Color: x / Appearance: x / SG: x / pH: x  Gluc: 90 mg/dL / Ketone: x  / Bili: x / Urobili: x   Blood: x / Protein: x / Nitrite: x   Leuk Esterase: x / RBC: x / WBC x   Sq Epi: x / Non Sq Epi: x / Bacteria: x      Microbiology: reviewed     Culture - Urine (collected 07-07-24 @ 13:22)  Source: Clean Catch Clean Catch (Midstream)  Final Report (07-08-24 @ 16:05):    No growth        Radiology: reviewed     Medications:  acetaminophen     Tablet .. 650 milliGRAM(s) Oral every 6 hours PRN  aluminum hydroxide/magnesium hydroxide/simethicone Suspension 30 milliLiter(s) Oral every 4 hours PRN  chlorhexidine 2% Cloths 1 Application(s) Topical daily  finasteride 5 milliGRAM(s) Oral daily  melatonin 3 milliGRAM(s) Oral at bedtime PRN  ondansetron Injectable 4 milliGRAM(s) IV Push every 8 hours PRN  sodium bicarbonate 650 milliGRAM(s) Oral three times a day  sodium chloride 0.65% Nasal 1 Spray(s) Both Nostrils two times a day PRN  sodium chloride 0.9%. 1000 milliLiter(s) IV Continuous <Continuous>  tamsulosin 0.4 milliGRAM(s) Oral at bedtime    Antimicrobials:  
Patient is a 63y old  Male who presents with a chief complaint of suprapubic pain (11 Jul 2024 09:49)    Date of servie : 07-11-24 @ 18:58  INTERVAL HPI/OVERNIGHT EVENTS:  T(C): 36.8 (07-11-24 @ 12:58), Max: 37 (07-11-24 @ 06:22)  HR: 68 (07-11-24 @ 12:58) (68 - 76)  BP: 119/76 (07-11-24 @ 12:58) (104/63 - 140/75)  RR: 18 (07-11-24 @ 12:58) (18 - 18)  SpO2: 100% (07-11-24 @ 12:58) (99% - 100%)  Wt(kg): --  I&O's Summary    10 Jul 2024 07:01  -  11 Jul 2024 07:00  --------------------------------------------------------  IN: 1655 mL / OUT: 2700 mL / NET: -1045 mL        LABS:                        8.5    4.35  )-----------( 125      ( 10 Jul 2024 06:17 )             26.1     07-10    143  |  102  |  55<H>  ----------------------------<  90  3.7   |  28  |  3.27<H>    Ca    8.4      10 Jul 2024 06:17  Phos  3.8     07-10  Mg     1.60     07-10        Urinalysis Basic - ( 10 Jul 2024 06:17 )    Color: x / Appearance: x / SG: x / pH: x  Gluc: 90 mg/dL / Ketone: x  / Bili: x / Urobili: x   Blood: x / Protein: x / Nitrite: x   Leuk Esterase: x / RBC: x / WBC x   Sq Epi: x / Non Sq Epi: x / Bacteria: x      CAPILLARY BLOOD GLUCOSE            Urinalysis Basic - ( 10 Jul 2024 06:17 )    Color: x / Appearance: x / SG: x / pH: x  Gluc: 90 mg/dL / Ketone: x  / Bili: x / Urobili: x   Blood: x / Protein: x / Nitrite: x   Leuk Esterase: x / RBC: x / WBC x   Sq Epi: x / Non Sq Epi: x / Bacteria: x        MEDICATIONS  (STANDING):  chlorhexidine 2% Cloths 1 Application(s) Topical daily  finasteride 5 milliGRAM(s) Oral daily  sodium bicarbonate 650 milliGRAM(s) Oral three times a day  sodium chloride 0.9%. 1000 milliLiter(s) (125 mL/Hr) IV Continuous <Continuous>  tamsulosin 0.4 milliGRAM(s) Oral at bedtime    MEDICATIONS  (PRN):  acetaminophen     Tablet .. 650 milliGRAM(s) Oral every 6 hours PRN Temp greater or equal to 38C (100.4F), Mild Pain (1 - 3)  aluminum hydroxide/magnesium hydroxide/simethicone Suspension 30 milliLiter(s) Oral every 4 hours PRN Dyspepsia  melatonin 3 milliGRAM(s) Oral at bedtime PRN Insomnia  ondansetron Injectable 4 milliGRAM(s) IV Push every 8 hours PRN Nausea and/or Vomiting  sodium chloride 0.65% Nasal 1 Spray(s) Both Nostrils two times a day PRN Nasal Congestion          PHYSICAL EXAM:  GENERAL: NAD, well-groomed, well-developed  HEAD:  Atraumatic, Normocephalic  CHEST/LUNG: Clear to percussion bilaterally; No rales, rhonchi, wheezing, or rubs  HEART: Regular rate and rhythm; No murmurs, rubs, or gallops  ABDOMEN: Soft, Nontender, Nondistended; Bowel sounds present  EXTREMITIES:  2+ Peripheral Pulses, No clubbing, cyanosis, or edema  LYMPH: No lymphadenopathy noted  SKIN: No rashes or lesions    Care Discussed with Consultants/Other Providers [ ] YES  [ ] NO
pt resting at time of my eval    nad nact silva slight orange tinge      cr 3.71 downtrending   urinary retention question of bladder mucsal integrity    cont cath  pending ct cystogram  await cr jose raul
[facility] [place] [facility]OLOGY [facility]ACT[facility]   MD LASHON GARCIA MD RUORU WONG, PA    TEL:  OFFICE: [phone]  DR RANDI FIGUEROA     RENAL FOLLOW UP NOTE-- Date of Service ;; 07-14-24 @ 09:50  --------------------------------------------------------------------------------  HPI:  Pt seen and examined at bedside          PAST HISTORY  --------------------------------------------------------------------------------  No significant changes to PMH, PSH, FHx, SHx, unless otherwise noted    ALLERGIES & MEDICATIONS  --------------------------------------------------------------------------------  Allergies    No Known Allergies    Intolerances      Standing Inpatient Medications  chlorhexidine 2% Cloths 1 Application(s) Topical daily  finasteride 5 milliGRAM(s) Oral daily  magnesium oxide 400 milliGRAM(s) Oral three times a day with meals  sodium bicarbonate 650 milliGRAM(s) Oral three times a day  sodium bicarbonate  Infusion 0.201 mEq/kG/Hr IV Continuous <Continuous>  tamsulosin 0.4 milliGRAM(s) Oral at bedtime    PRN Inpatient Medications  acetaminophen     Tablet .. 650 milliGRAM(s) Oral every 6 hours PRN  aluminum hydroxide/magnesium hydroxide/simethicone Suspension 30 milliLiter(s) Oral every 4 hours PRN  melatonin 3 milliGRAM(s) Oral at bedtime PRN  ondansetron Injectable 4 milliGRAM(s) IV Push every 8 hours PRN  sodium chloride 0.65% Nasal 1 Spray(s) Both Nostrils two times a day PRN      REVIEW OF SYSTEMS  --------------------------------------------------------------------------------  General: no fever  CVS: no chest pain  RESP: no sob, no cough  ABD: no abdominal pain  : no dysuria,  MSK: no edema     VITALS/PHYSICAL EXAM  --------------------------------------------------------------------------------  T(C): 36.6 (07-14-24 @ 05:35), Max: 37.2 (07-13-24 @ 21:00)  HR: 77 (07-14-24 @ 05:35) (63 - 88)  BP: 140/77 (07-14-24 @ 05:35) (102/69 - 140/77)  RR: 18 (07-14-24 @ 05:35) (16 - 18)  SpO2: 100% (07-14-24 @ 05:35) (100% - 100%)  Wt(kg): --        07-13-24 @ 07:01  -  07-14-24 @ 07:00  --------------------------------------------------------  IN: 1840 mL / OUT: 3600 mL / NET: -1760 mL    07-14-24 @ 07:01  -  07-14-24 @ 09:50  --------------------------------------------------------  IN: 0 mL / OUT: 1600 mL / NET: -1600 mL      Physical Exam:  	Gen: NAD  	HEENT: MMM  	Pulm: CTA B/L  	CV: S1S2  	Abd: Soft, +BS  	Ext: No LE edema B/L                      Neuro: Awake , alert  	Skin: Warm and Dry   	Vascular access:           VANGIE silva    LABS/STUDIES  --------------------------------------------------------------------------------              8.8    4.76  >-----------<  125      [07-13-24 @ 08:10]              28.5     137  |  110  |  42  ----------------------------<  83      [07-13-24 @ 08:10]  5.2   |  14  |  2.49        Ca     8.4     [07-13-24 @ 08:10]      Mg     1.40     [07-13-24 @ 08:10]      Phos  2.7     [07-13-24 @ 08:10]            Creatinine Trend:  SCr 2.49 [07-13 @ 08:10]  SCr 3.27 [07-10 @ 06:17]  SCr 3.71 [07-09 @ 04:18]  SCr 4.49 [07-08 @ 12:45]  SCr 4.80 [07-08 @ 07:05]    Urinalysis - [07-13-24 @ 08:10]      Color  / Appearance  / SG  / pH       Gluc 83 / Ketone   / Bili  / Urobili        Blood  / Protein  / Leuk Est  / Nitrite       RBC  / WBC  / Hyaline  / Gran  / Sq Epi  / Non Sq Epi  / Bacteria           
[facility], Division of Infectious Diseases  DOMINGO Torre Y. Patel, S. Shah, G. Josse  [phone]  ([phone] - weekdays after 5pm and weekends)    Name: CRYSTAL SIEGEL  Age/Gender: 63y Male  MRN: [id number]    Interval History:  Notes reviewed.   No concerning overnight events.  Afebrile.   denies dysuria, reports headache is resolved and feeling of bladder pressure is better    Allergies: No Known Allergies      Objective:  Vitals:   T(F): 98.2 (07-09-24 @ 08:09), Max: 98.2 (07-09-24 @ 08:09)  HR: 67 (07-09-24 @ 08:09) (67 - 82)  BP: 121/84 (07-09-24 @ 08:09) (113/71 - 127/81)  RR: 18 (07-09-24 @ 08:09) (16 - 18)  SpO2: 100% (07-09-24 @ 08:09) (100% - 100%)  Physical Examination:  General: no acute distress  HEENT: anicteric  Cardio: S1, S2, normal rate  Resp: breathing comfortably on RA   Abd: soft, NT, ND  : silva  Ext: no LE edema  Skin: warm, dry    Laboratory Studies:  CBC:                       9.2    5.51  )-----------( 145      ( 09 Jul 2024 04:18 )             27.2     WBC Trend:  5.51 07-09-24 @ 04:18  6.93 07-08-24 @ 07:05  5.21 07-07-24 @ 09:15    CMP: 07-09    139  |  104  |  67<H>  ----------------------------<  100<H>  4.8   |  17<L>  |  3.71<H>    Ca    8.4      09 Jul 2024 04:18  Phos  4.3     07-09  Mg     1.70     07-09    TPro  7.5  /  Alb  4.7  /  TBili  0.3  /  DBili  x   /  AST  <5<L>  /  ALT  7   /  AlkPhos  56  07-07      LIVER FUNCTIONS - ( 07 Jul 2024 09:15 )  Alb: 4.7 g/dL / Pro: 7.5 g/dL / ALK PHOS: 56 U/L / ALT: 7 U/L / AST: <5 U/L / GGT: x             Urinalysis Basic - ( 09 Jul 2024 04:18 )    Color: x / Appearance: x / SG: x / pH: x  Gluc: 100 mg/dL / Ketone: x  / Bili: x / Urobili: x   Blood: x / Protein: x / Nitrite: x   Leuk Esterase: x / RBC: x / WBC x   Sq Epi: x / Non Sq Epi: x / Bacteria: x      Microbiology: reviewed     Culture - Urine (collected 07-07-24 @ 13:22)  Source: Clean Catch Clean Catch (Midstream)  Final Report (07-08-24 @ 16:05):    No growth        Radiology: reviewed     Medications:  acetaminophen     Tablet .. 650 milliGRAM(s) Oral every 6 hours PRN  aluminum hydroxide/magnesium hydroxide/simethicone Suspension 30 milliLiter(s) Oral every 4 hours PRN  finasteride 5 milliGRAM(s) Oral daily  melatonin 3 milliGRAM(s) Oral at bedtime PRN  ondansetron Injectable 4 milliGRAM(s) IV Push every 8 hours PRN  sodium bicarbonate  Infusion 0.251 mEq/kG/Hr IV Continuous <Continuous>  sodium chloride 0.65% Nasal 1 Spray(s) Both Nostrils two times a day PRN  tamsulosin 0.4 milliGRAM(s) Oral at bedtime    Antimicrobials:  
[facility], Division of Infectious Diseases  DOMINGO Torre Y. Patel, S. Shah, G. Josse  [phone]  ([phone] - weekdays after 5pm and weekends)    Name: CRYSTAL SIEGEL  Age/Gender: 63y Male  MRN: [id number]    Interval History:  Notes reviewed.   No concerning overnight events.  Afebrile.   he is eager to go home soon  s/p CT urogram    Allergies: No Known Allergies      Objective:  Vitals:   T(F): 98.6 (07-10-24 @ 05:30), Max: 99.2 (07-09-24 @ 20:26)  HR: 79 (07-10-24 @ 05:30) (69 - 89)  BP: 148/82 (07-10-24 @ 05:30) (112/60 - 148/82)  RR: 18 (07-10-24 @ 05:30) (17 - 19)  SpO2: 100% (07-10-24 @ 05:30) (100% - 100%)  Physical Examination:  General: no acute distress  HEENT: anicteric  Cardio: normal rate  Resp: breathing comfortably on RA   Abd: soft, NT, ND  : silva w/ blood tinged urine  Ext: no LE edema  Skin: warm, dry      Laboratory Studies:  CBC:                       8.5    4.35  )-----------( 125      ( 10 Jul 2024 06:17 )             26.1     WBC Trend:  4.35 07-10-24 @ 06:17  5.51 07-09-24 @ 04:18  6.93 07-08-24 @ 07:05  5.21 07-07-24 @ 09:15    CMP: 07-10    143  |  102  |  55<H>  ----------------------------<  90  3.7   |  28  |  3.27<H>    Ca    8.4      10 Jul 2024 06:17  Phos  3.8     07-10  Mg     1.60     07-10            Urinalysis Basic - ( 10 Jul 2024 06:17 )    Color: x / Appearance: x / SG: x / pH: x  Gluc: 90 mg/dL / Ketone: x  / Bili: x / Urobili: x   Blood: x / Protein: x / Nitrite: x   Leuk Esterase: x / RBC: x / WBC x   Sq Epi: x / Non Sq Epi: x / Bacteria: x      Microbiology: reviewed     Culture - Urine (collected 07-07-24 @ 13:22)  Source: Clean Catch Clean Catch (Midstream)  Final Report (07-08-24 @ 16:05):    No growth        Radiology: reviewed     Medications:  acetaminophen     Tablet .. 650 milliGRAM(s) Oral every 6 hours PRN  aluminum hydroxide/magnesium hydroxide/simethicone Suspension 30 milliLiter(s) Oral every 4 hours PRN  chlorhexidine 2% Cloths 1 Application(s) Topical daily  finasteride 5 milliGRAM(s) Oral daily  melatonin 3 milliGRAM(s) Oral at bedtime PRN  ondansetron Injectable 4 milliGRAM(s) IV Push every 8 hours PRN  sodium chloride 0.65% Nasal 1 Spray(s) Both Nostrils two times a day PRN  sodium chloride 0.9%. 1000 milliLiter(s) IV Continuous <Continuous>  tamsulosin 0.4 milliGRAM(s) Oral at bedtime    Antimicrobials:  
Patient is a 63y old  Male who presents with a chief complaint of suprapubic pain (08 Jul 2024 16:34)    Date of servie : 07-08-24 @ 17:29  INTERVAL HPI/OVERNIGHT EVENTS:  T(C): 36.5 (07-08-24 @ 13:09), Max: 37.1 (07-08-24 @ 02:00)  HR: 82 (07-08-24 @ 13:09) (81 - 87)  BP: 125/82 (07-08-24 @ 13:09) (118/74 - 147/92)  RR: 17 (07-08-24 @ 13:09) (16 - 18)  SpO2: 100% (07-08-24 @ 13:09) (98% - 100%)  Wt(kg): --  I&O's Summary    07 Jul 2024 07:01  -  08 Jul 2024 07:00  --------------------------------------------------------  IN: 480 mL / OUT: 7350 mL / NET: -6870 mL    08 Jul 2024 07:01  -  08 Jul 2024 17:29  --------------------------------------------------------  IN: 0 mL / OUT: 1400 mL / NET: -1400 mL        LABS:                        9.8    6.93  )-----------( 160      ( 08 Jul 2024 07:05 )             30.3     07-08    141  |  108<H>  |  76<H>  ----------------------------<  123<H>  5.1   |  17<L>  |  4.49<H>    Ca    8.4      08 Jul 2024 12:45    TPro  7.5  /  Alb  4.7  /  TBili  0.3  /  DBili  x   /  AST  <5<L>  /  ALT  7   /  AlkPhos  56  07-07      Urinalysis Basic - ( 08 Jul 2024 12:45 )    Color: x / Appearance: x / SG: x / pH: x  Gluc: 123 mg/dL / Ketone: x  / Bili: x / Urobili: x   Blood: x / Protein: x / Nitrite: x   Leuk Esterase: x / RBC: x / WBC x   Sq Epi: x / Non Sq Epi: x / Bacteria: x      CAPILLARY BLOOD GLUCOSE      POCT Blood Glucose.: 75 mg/dL (08 Jul 2024 05:35)  POCT Blood Glucose.: 86 mg/dL (08 Jul 2024 03:34)        Urinalysis Basic - ( 08 Jul 2024 12:45 )    Color: x / Appearance: x / SG: x / pH: x  Gluc: 123 mg/dL / Ketone: x  / Bili: x / Urobili: x   Blood: x / Protein: x / Nitrite: x   Leuk Esterase: x / RBC: x / WBC x   Sq Epi: x / Non Sq Epi: x / Bacteria: x        MEDICATIONS  (STANDING):  finasteride 5 milliGRAM(s) Oral daily  sodium bicarbonate  Infusion 0.251 mEq/kG/Hr (125 mL/Hr) IV Continuous <Continuous>  sodium zirconium cyclosilicate 10 Gram(s) Oral once  tamsulosin 0.4 milliGRAM(s) Oral at bedtime    MEDICATIONS  (PRN):  acetaminophen     Tablet .. 650 milliGRAM(s) Oral every 6 hours PRN Temp greater or equal to 38C (100.4F), Mild Pain (1 - 3)  aluminum hydroxide/magnesium hydroxide/simethicone Suspension 30 milliLiter(s) Oral every 4 hours PRN Dyspepsia  melatonin 3 milliGRAM(s) Oral at bedtime PRN Insomnia  ondansetron Injectable 4 milliGRAM(s) IV Push every 8 hours PRN Nausea and/or Vomiting  sodium chloride 0.65% Nasal 1 Spray(s) Both Nostrils two times a day PRN Nasal Congestion      
Patient is a 63y old  Male who presents with a chief complaint of suprapubic pain (10 Jul 2024 09:56)    Date of servie : 07-10-24 @ 15:24  INTERVAL HPI/OVERNIGHT EVENTS:  T(C): 36.9 (07-10-24 @ 11:55), Max: 37.3 (07-09-24 @ 20:26)  HR: 83 (07-10-24 @ 11:55) (69 - 83)  BP: 118/66 (07-10-24 @ 11:55) (118/66 - 148/82)  RR: 18 (07-10-24 @ 11:55) (18 - 19)  SpO2: 97% (07-10-24 @ 11:55) (97% - 100%)  Wt(kg): --  I&O's Summary    09 Jul 2024 07:01  -  10 Jul 2024 07:00  --------------------------------------------------------  IN: 720 mL / OUT: 4100 mL / NET: -3380 mL        LABS:                        8.5    4.35  )-----------( 125      ( 10 Jul 2024 06:17 )             26.1     07-10    143  |  102  |  55<H>  ----------------------------<  90  3.7   |  28  |  3.27<H>    Ca    8.4      10 Jul 2024 06:17  Phos  3.8     07-10  Mg     1.60     07-10        Urinalysis Basic - ( 10 Jul 2024 06:17 )    Color: x / Appearance: x / SG: x / pH: x  Gluc: 90 mg/dL / Ketone: x  / Bili: x / Urobili: x   Blood: x / Protein: x / Nitrite: x   Leuk Esterase: x / RBC: x / WBC x   Sq Epi: x / Non Sq Epi: x / Bacteria: x      CAPILLARY BLOOD GLUCOSE            Urinalysis Basic - ( 10 Jul 2024 06:17 )    Color: x / Appearance: x / SG: x / pH: x  Gluc: 90 mg/dL / Ketone: x  / Bili: x / Urobili: x   Blood: x / Protein: x / Nitrite: x   Leuk Esterase: x / RBC: x / WBC x   Sq Epi: x / Non Sq Epi: x / Bacteria: x        MEDICATIONS  (STANDING):  chlorhexidine 2% Cloths 1 Application(s) Topical daily  finasteride 5 milliGRAM(s) Oral daily  sodium chloride 0.9%. 1000 milliLiter(s) (125 mL/Hr) IV Continuous <Continuous>  tamsulosin 0.4 milliGRAM(s) Oral at bedtime    MEDICATIONS  (PRN):  acetaminophen     Tablet .. 650 milliGRAM(s) Oral every 6 hours PRN Temp greater or equal to 38C (100.4F), Mild Pain (1 - 3)  aluminum hydroxide/magnesium hydroxide/simethicone Suspension 30 milliLiter(s) Oral every 4 hours PRN Dyspepsia  melatonin 3 milliGRAM(s) Oral at bedtime PRN Insomnia  ondansetron Injectable 4 milliGRAM(s) IV Push every 8 hours PRN Nausea and/or Vomiting  sodium chloride 0.65% Nasal 1 Spray(s) Both Nostrils two times a day PRN Nasal Congestion          PHYSICAL EXAM:  GENERAL: NAD, well-groomed, well-developed  HEAD:  Atraumatic, Normocephalic  CHEST/LUNG: Clear to percussion bilaterally; No rales, rhonchi, wheezing, or rubs  HEART: Regular rate and rhythm; No murmurs, rubs, or gallops  ABDOMEN: Soft, Nontender, Nondistended; Bowel sounds present  EXTREMITIES:  2+ Peripheral Pulses, No clubbing, cyanosis, or edema  LYMPH: No lymphadenopathy noted  SKIN: No rashes or lesions    Care Discussed with Consultants/Other Providers [ ] YES  [ ] NO
Patient is a 63y old  Male who presents with a chief complaint of suprapubic pain (15 Jul 2024 09:59)    Date of servie : 07-15-24 @ 16:56  INTERVAL HPI/OVERNIGHT EVENTS:  T(C): 36.6 (07-15-24 @ 12:40), Max: 36.9 (07-14-24 @ 17:04)  HR: 60 (07-15-24 @ 12:40) (60 - 75)  BP: 128/75 (07-15-24 @ 12:40) (111/65 - 128/75)  RR: 18 (07-15-24 @ 12:40) (18 - 18)  SpO2: 100% (07-15-24 @ 12:40) (98% - 100%)  Wt(kg): --  I&O's Summary    14 Jul 2024 07:01  -  15 Jul 2024 07:00  --------------------------------------------------------  IN: 650 mL / OUT: 4275 mL / NET: -3625 mL        LABS:              CAPILLARY BLOOD GLUCOSE                MEDICATIONS  (STANDING):  chlorhexidine 2% Cloths 1 Application(s) Topical daily  finasteride 5 milliGRAM(s) Oral daily  sodium bicarbonate 650 milliGRAM(s) Oral three times a day  sodium bicarbonate  Infusion 0.201 mEq/kG/Hr (100 mL/Hr) IV Continuous <Continuous>  tamsulosin 0.4 milliGRAM(s) Oral at bedtime    MEDICATIONS  (PRN):  acetaminophen     Tablet .. 650 milliGRAM(s) Oral every 6 hours PRN Temp greater or equal to 38C (100.4F), Mild Pain (1 - 3)  aluminum hydroxide/magnesium hydroxide/simethicone Suspension 30 milliLiter(s) Oral every 4 hours PRN Dyspepsia  melatonin 3 milliGRAM(s) Oral at bedtime PRN Insomnia  ondansetron Injectable 4 milliGRAM(s) IV Push every 8 hours PRN Nausea and/or Vomiting  sodium chloride 0.65% Nasal 1 Spray(s) Both Nostrils two times a day PRN Nasal Congestion          PHYSICAL EXAM:  GENERAL: NAD, well-groomed, well-developed  HEAD:  Atraumatic, Normocephalic  CHEST/LUNG: Clear to percussion bilaterally; No rales, rhonchi, wheezing, or rubs  HEART: Regular rate and rhythm; No murmurs, rubs, or gallops  ABDOMEN: Soft, Nontender, Nondistended; Bowel sounds present  EXTREMITIES:  2+ Peripheral Pulses, No clubbing, cyanosis, or edema  LYMPH: No lymphadenopathy noted  SKIN: No rashes or lesions    Care Discussed with Consultants/Other Providers [ ] YES  [ ] NO
[facility] [place] [facility]   MD LASHON GARCIA MD ANGELA WONG, PA    TEL:  OFFICE: [phone]  From 5pm-7am Answering Service [phone]    -- RENAL FOLLOW UP NOTE ---Date of Service 07-11-24 @ 09:25    Patient is a 63y old  Male who presents with a chief complaint of suprapubic pain (10 Jul 2024 15:24)      Patient seen and examined at bedside. No chest pain/sob    VITALS:  T(F): 98.6 (07-11-24 @ 06:22), Max: 98.6 (07-11-24 @ 06:22)  HR: 76 (07-11-24 @ 06:22)  BP: 134/86 (07-11-24 @ 06:22)  RR: 18 (07-11-24 @ 06:22)  SpO2: 100% (07-11-24 @ 06:22)  Wt(kg): --    07-10 @ 07:01  -  07-11 @ 07:00  --------------------------------------------------------  IN: 1655 mL / OUT: 2700 mL / NET: -1045 mL          PHYSICAL EXAM:  General: NAD  Neck: No JVD  Respiratory: CTAB, no wheezes, rales or rhonchi  Cardiovascular: S1, S2, RRR  Gastrointestinal: BS+, soft, NT/ND  Extremities: No peripheral edema    Hospital Medications:   MEDICATIONS  (STANDING):  chlorhexidine 2% Cloths 1 Application(s) Topical daily  finasteride 5 milliGRAM(s) Oral daily  sodium chloride 0.9%. 1000 milliLiter(s) (125 mL/Hr) IV Continuous <Continuous>  tamsulosin 0.4 milliGRAM(s) Oral at bedtime      LABS:  07-10    143  |  102  |  55<H>  ----------------------------<  90  3.7   |  28  |  3.27<H>    Ca    8.4      10 Jul 2024 06:17  Phos  3.8     07-10  Mg     1.60     07-10      Creatinine Trend: 3.27 <--, 3.71 <--, 4.49 <--, 4.80 <--, 5.28 <--, 5.14 <--, 4.94 <--, 5.25 <--                                8.5    4.35  )-----------( 125      ( 10 Jul 2024 06:17 )             26.1     Urine Studies:  Urinalysis - [07-10-24 @ 06:17]      Color  / Appearance  / SG  / pH       Gluc 90 / Ketone   / Bili  / Urobili        Blood  / Protein  / Leuk Est  / Nitrite       RBC  / WBC  / Hyaline  / Gran  / Sq Epi  / Non Sq Epi  / Bacteria             RADIOLOGY & ADDITIONAL STUDIES:  
[facility] [place] [facility]   MD LASHON GARCIA MD ANGELA WONG, PA    TEL:  OFFICE: [phone]  From 5pm-7am Answering Service [phone]    -- RENAL FOLLOW UP NOTE ---Date of Service 07-10-24 @ 09:56    Patient is a 63y old  Male who presents with a chief complaint of suprapubic pain (10 Jul 2024 09:31)      Patient seen and examined at bedside. No chest pain/sob    VITALS:  T(F): 98.6 (07-10-24 @ 05:30), Max: 99.2 (07-09-24 @ 20:26)  HR: 79 (07-10-24 @ 05:30)  BP: 148/82 (07-10-24 @ 05:30)  RR: 18 (07-10-24 @ 05:30)  SpO2: 100% (07-10-24 @ 05:30)  Wt(kg): --    07-09 @ 07:01  -  07-10 @ 07:00  --------------------------------------------------------  IN: 720 mL / OUT: 4100 mL / NET: -3380 mL          PHYSICAL EXAM:  General: NAD  Neck: No JVD  Respiratory: CTAB, no wheezes, rales or rhonchi  Cardiovascular: S1, S2, RRR  Gastrointestinal: BS+, soft, NT/ND  Extremities: No peripheral edema    Hospital Medications:   MEDICATIONS  (STANDING):  chlorhexidine 2% Cloths 1 Application(s) Topical daily  finasteride 5 milliGRAM(s) Oral daily  sodium chloride 0.9%. 1000 milliLiter(s) (125 mL/Hr) IV Continuous <Continuous>  tamsulosin 0.4 milliGRAM(s) Oral at bedtime      LABS:  07-10    143  |  102  |  55<H>  ----------------------------<  90  3.7   |  28  |  3.27<H>    Ca    8.4      10 Jul 2024 06:17  Phos  3.8     07-10  Mg     1.60     07-10      Creatinine Trend: 3.27 <--, 3.71 <--, 4.49 <--, 4.80 <--, 5.28 <--, 5.14 <--, 4.94 <--, 5.25 <--    Phosphorus: 3.8 mg/dL (07-10 @ 06:17)                              8.5    4.35  )-----------( 125      ( 10 Jul 2024 06:17 )             26.1     Urine Studies:  Urinalysis - [07-10-24 @ 06:17]      Color  / Appearance  / SG  / pH       Gluc 90 / Ketone   / Bili  / Urobili        Blood  / Protein  / Leuk Est  / Nitrite       RBC  / WBC  / Hyaline  / Gran  / Sq Epi  / Non Sq Epi  / Bacteria             RADIOLOGY & ADDITIONAL STUDIES:  
Patient is a 63y old  Male who presents with a chief complaint of suprapubic pain (14 Jul 2024 09:49)    Date of servie : 07-14-24 @ 10:58  INTERVAL HPI/OVERNIGHT EVENTS:  T(C): 36.6 (07-14-24 @ 05:35), Max: 37.2 (07-13-24 @ 21:00)  HR: 77 (07-14-24 @ 05:35) (63 - 88)  BP: 140/77 (07-14-24 @ 05:35) (102/69 - 140/77)  RR: 18 (07-14-24 @ 05:35) (16 - 18)  SpO2: 100% (07-14-24 @ 05:35) (100% - 100%)  Wt(kg): --  I&O's Summary    13 Jul 2024 07:01  -  14 Jul 2024 07:00  --------------------------------------------------------  IN: 1840 mL / OUT: 3600 mL / NET: -1760 mL    14 Jul 2024 07:01  -  14 Jul 2024 10:58  --------------------------------------------------------  IN: 0 mL / OUT: 1600 mL / NET: -1600 mL        LABS:                        8.8    4.76  )-----------( 125      ( 13 Jul 2024 08:10 )             28.5     07-13    137  |  110<H>  |  42<H>  ----------------------------<  83  5.2   |  14<L>  |  2.49<H>    Ca    8.4      13 Jul 2024 08:10  Phos  2.7     07-13  Mg     1.40     07-13        Urinalysis Basic - ( 13 Jul 2024 08:10 )    Color: x / Appearance: x / SG: x / pH: x  Gluc: 83 mg/dL / Ketone: x  / Bili: x / Urobili: x   Blood: x / Protein: x / Nitrite: x   Leuk Esterase: x / RBC: x / WBC x   Sq Epi: x / Non Sq Epi: x / Bacteria: x      CAPILLARY BLOOD GLUCOSE            Urinalysis Basic - ( 13 Jul 2024 08:10 )    Color: x / Appearance: x / SG: x / pH: x  Gluc: 83 mg/dL / Ketone: x  / Bili: x / Urobili: x   Blood: x / Protein: x / Nitrite: x   Leuk Esterase: x / RBC: x / WBC x   Sq Epi: x / Non Sq Epi: x / Bacteria: x        MEDICATIONS  (STANDING):  chlorhexidine 2% Cloths 1 Application(s) Topical daily  finasteride 5 milliGRAM(s) Oral daily  magnesium oxide 400 milliGRAM(s) Oral three times a day with meals  sodium bicarbonate 650 milliGRAM(s) Oral three times a day  sodium bicarbonate  Infusion 0.201 mEq/kG/Hr (100 mL/Hr) IV Continuous <Continuous>  tamsulosin 0.4 milliGRAM(s) Oral at bedtime    MEDICATIONS  (PRN):  acetaminophen     Tablet .. 650 milliGRAM(s) Oral every 6 hours PRN Temp greater or equal to 38C (100.4F), Mild Pain (1 - 3)  aluminum hydroxide/magnesium hydroxide/simethicone Suspension 30 milliLiter(s) Oral every 4 hours PRN Dyspepsia  melatonin 3 milliGRAM(s) Oral at bedtime PRN Insomnia  ondansetron Injectable 4 milliGRAM(s) IV Push every 8 hours PRN Nausea and/or Vomiting  sodium chloride 0.65% Nasal 1 Spray(s) Both Nostrils two times a day PRN Nasal Congestion          PHYSICAL EXAM:  GENERAL: NAD, well-groomed, well-developed  HEAD:  Atraumatic, Normocephalic  CHEST/LUNG: Clear to percussion bilaterally; No rales, rhonchi, wheezing, or rubs  HEART: Regular rate and rhythm; No murmurs, rubs, or gallops  ABDOMEN: Soft, Nontender, Nondistended; Bowel sounds present  EXTREMITIES:  2+ Peripheral Pulses, No clubbing, cyanosis, or edema  LYMPH: No lymphadenopathy noted  SKIN: No rashes or lesions    Care Discussed with Consultants/Other Providers [ ] YES  [ ] NO
[facility]   MD LASHON GARCIA MD ANGELA WONG, PA    TEL:  OFFICE: [phone]  From 5pm-7am Answering Service [phone]    -- RENAL FOLLOW UP NOTE ---Date of Service 07-15-24 @ 09:59    Patient is a 63y old  Male who presents with a chief complaint of 63-year-old male with no pertinent past medical history presents with urinary incontinence x 2 months and poor urinary stream w/ suprapubic pain found w/ YG on suspected CKD 2/2 obstructive nephropathy 2/2 suspected bladder mass per chart   (14 Jul 2024 12:47)      Patient seen and examined at bedside. No chest pain/sob    VITALS:  T(F): 98 (07-15-24 @ 08:38), Max: 98.5 (07-14-24 @ 11:30)  HR: 73 (07-15-24 @ 08:38)  BP: 117/65 (07-15-24 @ 08:38)  RR: 18 (07-15-24 @ 08:38)  SpO2: 100% (07-15-24 @ 08:38)  Wt(kg): --    07-14 @ 07:01  -  07-15 @ 07:00  --------------------------------------------------------  IN: 650 mL / OUT: 4275 mL / NET: -3625 mL          PHYSICAL EXAM:  General: NAD  Neck: No JVD  Respiratory: CTAB, no wheezes, rales or rhonchi  Cardiovascular: S1, S2, RRR  Gastrointestinal: BS+, soft, NT/ND  Extremities: No peripheral edema    Hospital Medications:   MEDICATIONS  (STANDING):  chlorhexidine 2% Cloths 1 Application(s) Topical daily  finasteride 5 milliGRAM(s) Oral daily  sodium bicarbonate 650 milliGRAM(s) Oral three times a day  sodium bicarbonate  Infusion 0.201 mEq/kG/Hr (100 mL/Hr) IV Continuous <Continuous>  tamsulosin 0.4 milliGRAM(s) Oral at bedtime      LABS:        Creatinine Trend: 2.49 <--, 3.27 <--, 3.71 <--, 4.49 <--            Urine Studies:  Urinalysis - [07-13-24 @ 08:10]      Color  / Appearance  / SG  / pH       Gluc 83 / Ketone   / Bili  / Urobili        Blood  / Protein  / Leuk Est  / Nitrite       RBC  / WBC  / Hyaline  / Gran  / Sq Epi  / Non Sq Epi  / Bacteria             RADIOLOGY & ADDITIONAL STUDIES:  
[facility], Division of Infectious Diseases  [facility] [address] Y. Patel, S. Shah, [place]. Jos[place]  [phone]  ([phone] - weekdays after 5pm and weekends)    Name: CRYSTAL SIEGEL  Age/Gender: 63y Male  MRN: [id number]    Interval History:  Notes reviewed.   No concerning overnight events.  Afebrile.     Allergies: No Known Allergies      Objective:  Vitals:   T(F): 98.2 (07-12-24 @ 04:37), Max: 98.3 (07-11-24 @ 21:25)  HR: 63 (07-12-24 @ 04:37) (63 - 82)  BP: 138/78 (07-12-24 @ 04:37) (114/61 - 138/78)  RR: 18 (07-12-24 @ 04:37) (18 - 18)  SpO2: 100% (07-12-24 @ 04:37) (97% - 100%)  Physical Examination:  General: no acute distress  HEENT: anicteric  Cardio: normal rate  Resp: breathing comfortably on RA  Abd: non-distended  : silva w/ hematuria  Ext: no LE edema    Laboratory Studies:  CBC:   WBC Trend:  4.35 07-10-24 @ 06:17  5.51 07-09-24 @ 04:18  6.93 07-08-24 @ 07:05  5.21 07-07-24 @ 09:15    CMP:               Microbiology: reviewed     Culture - Urine (collected 07-07-24 @ 13:22)  Source: Clean Catch Clean Catch (Midstream)  Final Report (07-08-24 @ 16:05):    No growth        Radiology: reviewed     Medications:  acetaminophen     Tablet .. 650 milliGRAM(s) Oral every 6 hours PRN  aluminum hydroxide/magnesium hydroxide/simethicone Suspension 30 milliLiter(s) Oral every 4 hours PRN  chlorhexidine 2% Cloths 1 Application(s) Topical daily  finasteride 5 milliGRAM(s) Oral daily  melatonin 3 milliGRAM(s) Oral at bedtime PRN  ondansetron Injectable 4 milliGRAM(s) IV Push every 8 hours PRN  sodium bicarbonate 650 milliGRAM(s) Oral three times a day  sodium chloride 0.65% Nasal 1 Spray(s) Both Nostrils two times a day PRN  sodium chloride 0.9%. 1000 milliLiter(s) IV Continuous <Continuous>  tamsulosin 0.4 milliGRAM(s) Oral at bedtime    Antimicrobials:  
Patient is a 63y old  Male who presents with a chief complaint of suprapubic pain (09 Jul 2024 09:10)    Date of servie : 07-09-24 @ 13:51  INTERVAL HPI/OVERNIGHT EVENTS:  T(C): 36.6 (07-09-24 @ 12:16), Max: 36.8 (07-09-24 @ 08:09)  HR: 89 (07-09-24 @ 12:16) (67 - 89)  BP: 112/60 (07-09-24 @ 12:16) (112/60 - 127/81)  RR: 17 (07-09-24 @ 12:16) (16 - 18)  SpO2: 100% (07-09-24 @ 12:16) (100% - 100%)  Wt(kg): --  I&O's Summary    08 Jul 2024 07:01  -  09 Jul 2024 07:00  --------------------------------------------------------  IN: 0 mL / OUT: 2800 mL / NET: -2800 mL        LABS:                        9.2    5.51  )-----------( 145      ( 09 Jul 2024 04:18 )             27.2     07-09    139  |  104  |  67<H>  ----------------------------<  100<H>  4.8   |  17<L>  |  3.71<H>    Ca    8.4      09 Jul 2024 04:18  Phos  4.3     07-09  Mg     1.70     07-09        Urinalysis Basic - ( 09 Jul 2024 04:18 )    Color: x / Appearance: x / SG: x / pH: x  Gluc: 100 mg/dL / Ketone: x  / Bili: x / Urobili: x   Blood: x / Protein: x / Nitrite: x   Leuk Esterase: x / RBC: x / WBC x   Sq Epi: x / Non Sq Epi: x / Bacteria: x      CAPILLARY BLOOD GLUCOSE            Urinalysis Basic - ( 09 Jul 2024 04:18 )    Color: x / Appearance: x / SG: x / pH: x  Gluc: 100 mg/dL / Ketone: x  / Bili: x / Urobili: x   Blood: x / Protein: x / Nitrite: x   Leuk Esterase: x / RBC: x / WBC x   Sq Epi: x / Non Sq Epi: x / Bacteria: x        MEDICATIONS  (STANDING):  finasteride 5 milliGRAM(s) Oral daily  sodium bicarbonate  Infusion 0.251 mEq/kG/Hr (125 mL/Hr) IV Continuous <Continuous>  tamsulosin 0.4 milliGRAM(s) Oral at bedtime    MEDICATIONS  (PRN):  acetaminophen     Tablet .. 650 milliGRAM(s) Oral every 6 hours PRN Temp greater or equal to 38C (100.4F), Mild Pain (1 - 3)  aluminum hydroxide/magnesium hydroxide/simethicone Suspension 30 milliLiter(s) Oral every 4 hours PRN Dyspepsia  melatonin 3 milliGRAM(s) Oral at bedtime PRN Insomnia  ondansetron Injectable 4 milliGRAM(s) IV Push every 8 hours PRN Nausea and/or Vomiting  sodium chloride 0.65% Nasal 1 Spray(s) Both Nostrils two times a day PRN Nasal Congestion          PHYSICAL EXAM:  GENERAL: NAD, well-groomed, well-developed  HEAD:  Atraumatic, Normocephalic  CHEST/LUNG: Clear to percussion bilaterally; No rales, rhonchi, wheezing, or rubs  HEART: Regular rate and rhythm; No murmurs, rubs, or gallops  ABDOMEN: Soft, Nontender, Nondistended; Bowel sounds present  EXTREMITIES:  2+ Peripheral Pulses, No clubbing, cyanosis, or edema  LYMPH: No lymphadenopathy noted  SKIN: No rashes or lesions    Care Discussed with Consultants/Other Providers [ ] YES  [ ] NO
Patient is a 63y old  Male who presents with a chief complaint of suprapubic pain (12 Jul 2024 09:00)    Date of servie : 07-12-24 @ 14:35  INTERVAL HPI/OVERNIGHT EVENTS:  T(C): 36.7 (07-12-24 @ 14:14), Max: 36.8 (07-11-24 @ 21:25)  HR: 65 (07-12-24 @ 14:14) (63 - 82)  BP: 111/65 (07-12-24 @ 14:14) (111/65 - 138/78)  RR: 17 (07-12-24 @ 14:14) (17 - 18)  SpO2: 100% (07-12-24 @ 14:14) (97% - 100%)  Wt(kg): --  I&O's Summary    11 Jul 2024 07:01  -  12 Jul 2024 07:00  --------------------------------------------------------  IN: 1780 mL / OUT: 4500 mL / NET: -2720 mL        LABS:              CAPILLARY BLOOD GLUCOSE                MEDICATIONS  (STANDING):  chlorhexidine 2% Cloths 1 Application(s) Topical daily  finasteride 5 milliGRAM(s) Oral daily  sodium bicarbonate 650 milliGRAM(s) Oral three times a day  sodium chloride 0.9%. 1000 milliLiter(s) (125 mL/Hr) IV Continuous <Continuous>  tamsulosin 0.4 milliGRAM(s) Oral at bedtime    MEDICATIONS  (PRN):  acetaminophen     Tablet .. 650 milliGRAM(s) Oral every 6 hours PRN Temp greater or equal to 38C (100.4F), Mild Pain (1 - 3)  aluminum hydroxide/magnesium hydroxide/simethicone Suspension 30 milliLiter(s) Oral every 4 hours PRN Dyspepsia  melatonin 3 milliGRAM(s) Oral at bedtime PRN Insomnia  ondansetron Injectable 4 milliGRAM(s) IV Push every 8 hours PRN Nausea and/or Vomiting  sodium chloride 0.65% Nasal 1 Spray(s) Both Nostrils two times a day PRN Nasal Congestion          PHYSICAL EXAM:  GENERAL: NAD, well-groomed, well-developed  HEAD:  Atraumatic, Normocephalic  CHEST/LUNG: Clear to percussion bilaterally; No rales, rhonchi, wheezing, or rubs  HEART: Regular rate and rhythm; No murmurs, rubs, or gallops  ABDOMEN: Soft, Nontender, Nondistended; Bowel sounds present  EXTREMITIES:  2+ Peripheral Pulses, No clubbing, cyanosis, or edema  LYMPH: No lymphadenopathy noted  SKIN: No rashes or lesions    Care Discussed with Consultants/Other Providers [ ] YES  [ ] NO
Patient is a 63y old  Male who presents with a chief complaint of suprapubic pain (13 Jul 2024 13:57)    Date of servie : 07-13-24 @ 16:25  INTERVAL HPI/OVERNIGHT EVENTS:  T(C): 36.7 (07-13-24 @ 12:11), Max: 37.2 (07-12-24 @ 21:51)  HR: 63 (07-13-24 @ 12:11) (62 - 74)  BP: 112/74 (07-13-24 @ 12:11) (105/68 - 125/73)  RR: 17 (07-13-24 @ 12:11) (17 - 18)  SpO2: 100% (07-13-24 @ 12:11) (100% - 100%)  Wt(kg): --  I&O's Summary    12 Jul 2024 07:01  -  13 Jul 2024 07:00  --------------------------------------------------------  IN: 0 mL / OUT: 3300 mL / NET: -3300 mL    13 Jul 2024 07:01  -  13 Jul 2024 16:25  --------------------------------------------------------  IN: 0 mL / OUT: 900 mL / NET: -900 mL        LABS:                        8.8    4.76  )-----------( 125      ( 13 Jul 2024 08:10 )             28.5     07-13    137  |  110<H>  |  42<H>  ----------------------------<  83  5.2   |  14<L>  |  2.49<H>    Ca    8.4      13 Jul 2024 08:10  Phos  2.7     07-13  Mg     1.40     07-13        Urinalysis Basic - ( 13 Jul 2024 08:10 )    Color: x / Appearance: x / SG: x / pH: x  Gluc: 83 mg/dL / Ketone: x  / Bili: x / Urobili: x   Blood: x / Protein: x / Nitrite: x   Leuk Esterase: x / RBC: x / WBC x   Sq Epi: x / Non Sq Epi: x / Bacteria: x      CAPILLARY BLOOD GLUCOSE            Urinalysis Basic - ( 13 Jul 2024 08:10 )    Color: x / Appearance: x / SG: x / pH: x  Gluc: 83 mg/dL / Ketone: x  / Bili: x / Urobili: x   Blood: x / Protein: x / Nitrite: x   Leuk Esterase: x / RBC: x / WBC x   Sq Epi: x / Non Sq Epi: x / Bacteria: x        MEDICATIONS  (STANDING):  chlorhexidine 2% Cloths 1 Application(s) Topical daily  finasteride 5 milliGRAM(s) Oral daily  magnesium oxide 400 milliGRAM(s) Oral three times a day with meals  sodium bicarbonate 650 milliGRAM(s) Oral three times a day  sodium bicarbonate  Infusion 0.201 mEq/kG/Hr (100 mL/Hr) IV Continuous <Continuous>  tamsulosin 0.4 milliGRAM(s) Oral at bedtime    MEDICATIONS  (PRN):  acetaminophen     Tablet .. 650 milliGRAM(s) Oral every 6 hours PRN Temp greater or equal to 38C (100.4F), Mild Pain (1 - 3)  aluminum hydroxide/magnesium hydroxide/simethicone Suspension 30 milliLiter(s) Oral every 4 hours PRN Dyspepsia  melatonin 3 milliGRAM(s) Oral at bedtime PRN Insomnia  ondansetron Injectable 4 milliGRAM(s) IV Push every 8 hours PRN Nausea and/or Vomiting  sodium chloride 0.65% Nasal 1 Spray(s) Both Nostrils two times a day PRN Nasal Congestion          PHYSICAL EXAM:  GENERAL: NAD, well-groomed, well-developed  HEAD:  Atraumatic, Normocephalic  CHEST/LUNG: Clear to percussion bilaterally; No rales, rhonchi, wheezing, or rubs  HEART: Regular rate and rhythm; No murmurs, rubs, or gallops  ABDOMEN: Soft, Nontender, Nondistended; Bowel sounds present  EXTREMITIES:  2+ Peripheral Pulses, No clubbing, cyanosis, or edema  LYMPH: No lymphadenopathy noted  SKIN: No rashes or lesions    Care Discussed with Consultants/Other Providers [ ] YES  [ ] NO
Subjective  Patient seen and examined at bedside. No acute complaints.     Objective    Vital signs  T(F): , Max: 98.7 (07-08-24 @ 02:00)  HR: 81 (07-08-24 @ 02:00)  BP: 137/89 (07-08-24 @ 02:00)  SpO2: 98% (07-08-24 @ 02:00)  Wt(kg): --    Output     OUT:    Voided (mL): 5600 mL  Total OUT: 5600 mL    Total NET: -5600 mL          Gen: NAD  Abd: soft, nontender, nondistended  : silva secured in place, draining clear light punch urine     Labs      07-08 @ 01:30    WBC --    / Hct --    / SCr 5.28     07-07 @ 18:45    WBC --    / Hct --    / SCr 5.14

## 2024-07-16 NOTE — DISCHARGE NOTE NURSING/CASE MANAGEMENT/SOCIAL WORK - NSSCNAMETXT_GEN_ALL_CORE
[facility]- this agency will send a nurse to your home for continuation of care.

## 2024-07-16 NOTE — PROGRESS NOTE ADULT - REASON FOR ADMISSION
suprapubic pain

## 2024-07-16 NOTE — DISCHARGE NOTE NURSING/CASE MANAGEMENT/SOCIAL WORK - PATIENT PORTAL LINK FT
You can access the FollowMyHealth Patient Portal offered by [facility] by registering at the following website: [email or web address]. By joining [facility]’s FollowMyHealth portal, you will also be able to view your health information using other applications (apps) compatible with our system.

## 2024-07-16 NOTE — PROGRESS NOTE ADULT - PROVIDER SPECIALTY LIST ADULT
Hospitalist
Hospitalist
Infectious Disease
Infectious Disease
Nephrology
Hospitalist
Hospitalist
Nephrology
Nephrology
Urology
Hospitalist
Infectious Disease
Infectious Disease
Nephrology
Urology
Hospitalist
Nephrology

## 2024-07-16 NOTE — PROGRESS NOTE ADULT - NS ATTEND BILL GEN_ALL_CORE
Attending to bill
no chills/no sweating/no fever

## 2024-07-16 NOTE — PROGRESS NOTE ADULT - NS ATTEND AMEND GEN_ALL_CORE FT
continue IV bicarb  monitor daily chemistry
continue IVF  asking to be discharged  explained him that he is still acidotic and renal function is improving but he will be better off getting IV bicarb for another day.  If he gets discharged give him oral bicarb 1300mg TID and close follow up with Urology
as above
still  has hematuria  continue IVF   follow up  daily chemistry
IVF if patient allows   follow up
as abive
as above

## 2024-07-16 NOTE — DISCHARGE NOTE NURSING/CASE MANAGEMENT/SOCIAL WORK - NSDCFUADDAPPT_GEN_ALL_CORE_FT
Follow up with your primary care provider in 1-2 weeks of discharge.    Follow up with urology at the [facility] in 2 weeks of discharge for trial of void and cystoscopy.    Follow up with nephrology, Dr. Agustin, in 2 weeks of discharge.    Follow up with ENT, outpatient for evaluation of possible sinusitis seen on imaging.

## 2024-07-19 PROBLEM — Z00.00 ENCOUNTER FOR PREVENTIVE HEALTH EXAMINATION: Status: ACTIVE | Noted: 2024-07-19

## 2024-07-19 PROBLEM — Z78.9 OTHER SPECIFIED HEALTH STATUS: Chronic | Status: ACTIVE | Noted: 2024-07-07

## 2024-07-22 ENCOUNTER — APPOINTMENT (OUTPATIENT)
Dept: UROLOGY | Facility: CLINIC | Age: 64
End: 2024-07-22
Payer: MEDICAID

## 2024-07-22 VITALS
SYSTOLIC BLOOD PRESSURE: 107 MMHG | DIASTOLIC BLOOD PRESSURE: 68 MMHG | HEART RATE: 81 BPM | OXYGEN SATURATION: 94 % | RESPIRATION RATE: 14 BRPM

## 2024-07-22 DIAGNOSIS — N40.1 BENIGN PROSTATIC HYPERPLASIA WITH LOWER URINARY TRACT SYMPMS: ICD-10-CM

## 2024-07-22 DIAGNOSIS — N13.8 BENIGN PROSTATIC HYPERPLASIA WITH LOWER URINARY TRACT SYMPMS: ICD-10-CM

## 2024-07-22 DIAGNOSIS — N17.9 ACUTE KIDNEY FAILURE, UNSPECIFIED: ICD-10-CM

## 2024-07-22 DIAGNOSIS — R33.9 RETENTION OF URINE, UNSPECIFIED: ICD-10-CM

## 2024-07-22 DIAGNOSIS — N32.9 BLADDER DISORDER, UNSPECIFIED: ICD-10-CM

## 2024-07-22 DIAGNOSIS — N18.9 CHRONIC KIDNEY DISEASE, UNSPECIFIED: ICD-10-CM

## 2024-07-22 PROCEDURE — 99215 OFFICE O/P EST HI 40 MIN: CPT | Mod: 25

## 2024-07-22 PROCEDURE — 52000 CYSTOURETHROSCOPY: CPT | Mod: 53

## 2024-07-22 NOTE — PHYSICAL EXAM
[TextEntry] : Clear urine normal meatus 16 Guamanian catheter in place yellow urineNormal meatus thin alert and oriented x 3

## 2024-07-22 NOTE — HISTORY OF PRESENT ILLNESS
[FreeTextEntry1] : Patient is here in follow-up from hospital visit to assess bladder for potential lesions and obstruction of the prostate.  He was recently hospitalized with acute kidney injury on top of renal insufficiency with bilateral hydronephrosis which is likely attributable to bladder outlet obstruction and obstructive uropathy the patient has a 16 Italian catheter with clear urine in place

## 2024-07-24 ENCOUNTER — TRANSCRIPTION ENCOUNTER (OUTPATIENT)
Age: 64
End: 2024-07-24

## 2024-10-03 ENCOUNTER — INPATIENT (INPATIENT)
Facility: HOSPITAL | Age: 64
LOS: 3 days | Discharge: HOME | DRG: 700 | End: 2024-10-07
Attending: INTERNAL MEDICINE | Admitting: INTERNAL MEDICINE
Payer: MEDICAID

## 2024-10-03 VITALS
HEART RATE: 58 BPM | TEMPERATURE: 98 F | SYSTOLIC BLOOD PRESSURE: 188 MMHG | DIASTOLIC BLOOD PRESSURE: 108 MMHG | HEIGHT: 72 IN | RESPIRATION RATE: 18 BRPM | OXYGEN SATURATION: 97 % | WEIGHT: 169.98 LBS

## 2024-10-03 DIAGNOSIS — N13.9 OBSTRUCTIVE AND REFLUX UROPATHY, UNSPECIFIED: ICD-10-CM

## 2024-10-03 DIAGNOSIS — R60.0 LOCALIZED EDEMA: ICD-10-CM

## 2024-10-03 DIAGNOSIS — R94.31 ABNORMAL ELECTROCARDIOGRAM [ECG] [EKG]: ICD-10-CM

## 2024-10-03 DIAGNOSIS — N17.9 ACUTE KIDNEY FAILURE, UNSPECIFIED: ICD-10-CM

## 2024-10-03 LAB
ALBUMIN SERPL ELPH-MCNC: 3.8 G/DL — SIGNIFICANT CHANGE UP (ref 3.3–5)
ALBUMIN SERPL ELPH-MCNC: 3.9 G/DL — SIGNIFICANT CHANGE UP (ref 3.3–5)
ALP SERPL-CCNC: 61 U/L — SIGNIFICANT CHANGE UP (ref 40–120)
ALP SERPL-CCNC: 63 U/L — SIGNIFICANT CHANGE UP (ref 40–120)
ALT FLD-CCNC: 11 U/L — SIGNIFICANT CHANGE UP (ref 10–45)
ALT FLD-CCNC: 11 U/L — SIGNIFICANT CHANGE UP (ref 10–45)
ANION GAP SERPL CALC-SCNC: 13 MMOL/L — SIGNIFICANT CHANGE UP (ref 5–17)
ANION GAP SERPL CALC-SCNC: 13 MMOL/L — SIGNIFICANT CHANGE UP (ref 5–17)
ANION GAP SERPL CALC-SCNC: 16 MMOL/L — SIGNIFICANT CHANGE UP (ref 5–17)
APPEARANCE UR: CLEAR — SIGNIFICANT CHANGE UP
APTT BLD: 31.1 SEC — SIGNIFICANT CHANGE UP (ref 24.5–35.6)
AST SERPL-CCNC: <5 U/L — LOW (ref 10–40)
AST SERPL-CCNC: <5 U/L — LOW (ref 10–40)
BASOPHILS # BLD AUTO: 0.01 K/UL — SIGNIFICANT CHANGE UP (ref 0–0.2)
BASOPHILS NFR BLD AUTO: 0.2 % — SIGNIFICANT CHANGE UP (ref 0–2)
BILIRUB SERPL-MCNC: 0.1 MG/DL — LOW (ref 0.2–1.2)
BILIRUB SERPL-MCNC: 0.2 MG/DL — SIGNIFICANT CHANGE UP (ref 0.2–1.2)
BILIRUB UR-MCNC: NEGATIVE — SIGNIFICANT CHANGE UP
BUN SERPL-MCNC: 74 MG/DL — HIGH (ref 7–23)
BUN SERPL-MCNC: 84 MG/DL — HIGH (ref 7–23)
BUN SERPL-MCNC: 85 MG/DL — HIGH (ref 7–23)
CALCIUM SERPL-MCNC: 8.2 MG/DL — LOW (ref 8.4–10.5)
CALCIUM SERPL-MCNC: 8.9 MG/DL — SIGNIFICANT CHANGE UP (ref 8.4–10.5)
CALCIUM SERPL-MCNC: 9.2 MG/DL — SIGNIFICANT CHANGE UP (ref 8.4–10.5)
CHLORIDE SERPL-SCNC: 109 MMOL/L — HIGH (ref 96–108)
CHLORIDE SERPL-SCNC: 110 MMOL/L — HIGH (ref 96–108)
CHLORIDE SERPL-SCNC: 115 MMOL/L — HIGH (ref 96–108)
CK MB CFR SERPL CALC: 3.1 NG/ML — SIGNIFICANT CHANGE UP (ref 0–6.7)
CK MB CFR SERPL CALC: 3.3 NG/ML — SIGNIFICANT CHANGE UP (ref 0–6.7)
CK SERPL-CCNC: 88 U/L — SIGNIFICANT CHANGE UP (ref 30–200)
CK SERPL-CCNC: 99 U/L — SIGNIFICANT CHANGE UP (ref 30–200)
CO2 SERPL-SCNC: 15 MMOL/L — LOW (ref 22–31)
CO2 SERPL-SCNC: 16 MMOL/L — LOW (ref 22–31)
CO2 SERPL-SCNC: 17 MMOL/L — LOW (ref 22–31)
COLOR SPEC: YELLOW — SIGNIFICANT CHANGE UP
CREAT ?TM UR-MCNC: 39 MG/DL — SIGNIFICANT CHANGE UP
CREAT SERPL-MCNC: 5.14 MG/DL — HIGH (ref 0.5–1.3)
CREAT SERPL-MCNC: 5.64 MG/DL — HIGH (ref 0.5–1.3)
CREAT SERPL-MCNC: 5.72 MG/DL — HIGH (ref 0.5–1.3)
DIFF PNL FLD: NEGATIVE — SIGNIFICANT CHANGE UP
EGFR: 10 ML/MIN/1.73M2 — LOW
EGFR: 10 ML/MIN/1.73M2 — LOW
EGFR: 12 ML/MIN/1.73M2 — LOW
EOSINOPHIL # BLD AUTO: 0.15 K/UL — SIGNIFICANT CHANGE UP (ref 0–0.5)
EOSINOPHIL NFR BLD AUTO: 3 % — SIGNIFICANT CHANGE UP (ref 0–6)
EPI CELLS # UR: PRESENT
GAS PNL BLDV: SIGNIFICANT CHANGE UP
GAS PNL BLDV: SIGNIFICANT CHANGE UP
GLUCOSE SERPL-MCNC: 142 MG/DL — HIGH (ref 70–99)
GLUCOSE SERPL-MCNC: 226 MG/DL — HIGH (ref 70–99)
GLUCOSE SERPL-MCNC: 92 MG/DL — SIGNIFICANT CHANGE UP (ref 70–99)
GLUCOSE UR QL: NEGATIVE MG/DL — SIGNIFICANT CHANGE UP
HCT VFR BLD CALC: 24.4 % — LOW (ref 39–50)
HGB BLD-MCNC: 7.5 G/DL — LOW (ref 13–17)
IMM GRANULOCYTES NFR BLD AUTO: 0.4 % — SIGNIFICANT CHANGE UP (ref 0–0.9)
INR BLD: 0.91 RATIO — SIGNIFICANT CHANGE UP (ref 0.85–1.16)
KETONES UR-MCNC: NEGATIVE MG/DL — SIGNIFICANT CHANGE UP
LEUKOCYTE ESTERASE UR-ACNC: ABNORMAL
LYMPHOCYTES # BLD AUTO: 0.9 K/UL — LOW (ref 1–3.3)
LYMPHOCYTES # BLD AUTO: 18.2 % — SIGNIFICANT CHANGE UP (ref 13–44)
MAGNESIUM SERPL-MCNC: 2 MG/DL — SIGNIFICANT CHANGE UP (ref 1.6–2.6)
MCHC RBC-ENTMCNC: 29.9 PG — SIGNIFICANT CHANGE UP (ref 27–34)
MCHC RBC-ENTMCNC: 30.7 GM/DL — LOW (ref 32–36)
MCV RBC AUTO: 97.2 FL — SIGNIFICANT CHANGE UP (ref 80–100)
MONOCYTES # BLD AUTO: 0.53 K/UL — SIGNIFICANT CHANGE UP (ref 0–0.9)
MONOCYTES NFR BLD AUTO: 10.7 % — SIGNIFICANT CHANGE UP (ref 2–14)
NEUTROPHILS # BLD AUTO: 3.34 K/UL — SIGNIFICANT CHANGE UP (ref 1.8–7.4)
NEUTROPHILS NFR BLD AUTO: 67.5 % — SIGNIFICANT CHANGE UP (ref 43–77)
NITRITE UR-MCNC: NEGATIVE — SIGNIFICANT CHANGE UP
NRBC # BLD: 0 /100 WBCS — SIGNIFICANT CHANGE UP (ref 0–0)
NT-PROBNP SERPL-SCNC: 6322 PG/ML — HIGH (ref 0–300)
OSMOLALITY SERPL: 322 MOSMOL/KG — HIGH (ref 280–301)
OSMOLALITY UR: 315 MOS/KG — SIGNIFICANT CHANGE UP (ref 300–900)
PH UR: 6.5 — SIGNIFICANT CHANGE UP (ref 5–8)
PLATELET # BLD AUTO: 214 K/UL — SIGNIFICANT CHANGE UP (ref 150–400)
POTASSIUM SERPL-MCNC: 4.9 MMOL/L — SIGNIFICANT CHANGE UP (ref 3.5–5.3)
POTASSIUM SERPL-MCNC: 6 MMOL/L — HIGH (ref 3.5–5.3)
POTASSIUM SERPL-MCNC: 6.2 MMOL/L — CRITICAL HIGH (ref 3.5–5.3)
POTASSIUM SERPL-SCNC: 4.9 MMOL/L — SIGNIFICANT CHANGE UP (ref 3.5–5.3)
POTASSIUM SERPL-SCNC: 6 MMOL/L — HIGH (ref 3.5–5.3)
POTASSIUM SERPL-SCNC: 6.2 MMOL/L — CRITICAL HIGH (ref 3.5–5.3)
POTASSIUM UR-SCNC: 27 MMOL/L — SIGNIFICANT CHANGE UP
PROT ?TM UR-MCNC: 19 MG/DL — HIGH (ref 0–12)
PROT SERPL-MCNC: 7 G/DL — SIGNIFICANT CHANGE UP (ref 6–8.3)
PROT SERPL-MCNC: 7.4 G/DL — SIGNIFICANT CHANGE UP (ref 6–8.3)
PROT UR-MCNC: 30 MG/DL
PROT/CREAT UR-RTO: 0.5 RATIO — HIGH (ref 0–0.2)
PROTHROM AB SERPL-ACNC: 10.5 SEC — SIGNIFICANT CHANGE UP (ref 9.9–13.4)
PSA FLD-MCNC: 7.32 NG/ML — HIGH (ref 0–4)
RBC # BLD: 2.51 M/UL — LOW (ref 4.2–5.8)
RBC # FLD: 16.1 % — HIGH (ref 10.3–14.5)
RBC CASTS # UR COMP ASSIST: 2 /HPF — SIGNIFICANT CHANGE UP (ref 0–4)
SODIUM SERPL-SCNC: 139 MMOL/L — SIGNIFICANT CHANGE UP (ref 135–145)
SODIUM SERPL-SCNC: 141 MMOL/L — SIGNIFICANT CHANGE UP (ref 135–145)
SODIUM SERPL-SCNC: 144 MMOL/L — SIGNIFICANT CHANGE UP (ref 135–145)
SODIUM UR-SCNC: 67 MMOL/L — SIGNIFICANT CHANGE UP
SP GR SPEC: 1.01 — SIGNIFICANT CHANGE UP (ref 1–1.03)
TROPONIN T, HIGH SENSITIVITY RESULT: 43 NG/L — SIGNIFICANT CHANGE UP (ref 0–51)
TROPONIN T, HIGH SENSITIVITY RESULT: 46 NG/L — SIGNIFICANT CHANGE UP (ref 0–51)
UROBILINOGEN FLD QL: 0.2 MG/DL — SIGNIFICANT CHANGE UP (ref 0.2–1)
UUN UR-MCNC: 340 MG/DL — SIGNIFICANT CHANGE UP
WBC # BLD: 4.95 K/UL — SIGNIFICANT CHANGE UP (ref 3.8–10.5)
WBC # FLD AUTO: 4.95 K/UL — SIGNIFICANT CHANGE UP (ref 3.8–10.5)
WBC UR QL: 34 /HPF — HIGH (ref 0–5)

## 2024-10-03 PROCEDURE — 71045 X-RAY EXAM CHEST 1 VIEW: CPT | Mod: 26

## 2024-10-03 PROCEDURE — 99497 ADVNCD CARE PLAN 30 MIN: CPT | Mod: 25

## 2024-10-03 PROCEDURE — 99223 1ST HOSP IP/OBS HIGH 75: CPT

## 2024-10-03 PROCEDURE — 93308 TTE F-UP OR LMTD: CPT | Mod: 26

## 2024-10-03 PROCEDURE — 99291 CRITICAL CARE FIRST HOUR: CPT

## 2024-10-03 PROCEDURE — 76770 US EXAM ABDO BACK WALL COMP: CPT | Mod: 26

## 2024-10-03 RX ORDER — INFLUENZA VIRUS VACCINE 15; 15; 15; 15 UG/.5ML; UG/.5ML; UG/.5ML; UG/.5ML
0.5 SUSPENSION INTRAMUSCULAR ONCE
Refills: 0 | Status: DISCONTINUED | OUTPATIENT
Start: 2024-10-03 | End: 2024-10-07

## 2024-10-03 RX ORDER — INSULIN REGULAR, HUMAN 100/ML
5 VIAL (ML) INJECTION ONCE
Refills: 0 | Status: COMPLETED | OUTPATIENT
Start: 2024-10-03 | End: 2024-10-03

## 2024-10-03 RX ORDER — SODIUM CHLORIDE 0.9 % (FLUSH) 0.9 %
500 SYRINGE (ML) INJECTION ONCE
Refills: 0 | Status: COMPLETED | OUTPATIENT
Start: 2024-10-03 | End: 2024-10-03

## 2024-10-03 RX ORDER — INSULIN REGULAR, HUMAN 100/ML
5 VIAL (ML) INJECTION ONCE
Refills: 0 | Status: DISCONTINUED | OUTPATIENT
Start: 2024-10-03 | End: 2024-10-03

## 2024-10-03 RX ORDER — ACETAMINOPHEN 325 MG
650 TABLET ORAL EVERY 6 HOURS
Refills: 0 | Status: DISCONTINUED | OUTPATIENT
Start: 2024-10-03 | End: 2024-10-07

## 2024-10-03 RX ORDER — ALCOHOL ANTISEPTIC PADS
50 PADS, MEDICATED (EA) TOPICAL ONCE
Refills: 0 | Status: COMPLETED | OUTPATIENT
Start: 2024-10-03 | End: 2024-10-03

## 2024-10-03 RX ORDER — SODIUM ZIRCONIUM CYCLOSILICATE 10 G/10G
10 POWDER, FOR SUSPENSION ORAL ONCE
Refills: 0 | Status: COMPLETED | OUTPATIENT
Start: 2024-10-03 | End: 2024-10-03

## 2024-10-03 RX ORDER — SODIUM BICARBONATE 650 MG
0.15 TABLET ORAL
Qty: 150 | Refills: 0 | Status: DISCONTINUED | OUTPATIENT
Start: 2024-10-03 | End: 2024-10-04

## 2024-10-03 RX ADMIN — Medication 50 MILLILITER(S): at 12:52

## 2024-10-03 RX ADMIN — Medication 50 MILLILITER(S): at 14:49

## 2024-10-03 RX ADMIN — Medication 75 MEQ/KG/HR: at 21:36

## 2024-10-03 RX ADMIN — Medication 5 UNIT(S): at 14:50

## 2024-10-03 RX ADMIN — SODIUM ZIRCONIUM CYCLOSILICATE 10 GRAM(S): 10 POWDER, FOR SUSPENSION ORAL at 14:48

## 2024-10-03 RX ADMIN — Medication 75 MEQ/KG/HR: at 14:57

## 2024-10-03 RX ADMIN — Medication 5 UNIT(S): at 12:53

## 2024-10-03 RX ADMIN — Medication 200 GRAM(S): at 12:55

## 2024-10-03 RX ADMIN — Medication 500 MILLILITER(S): at 12:57

## 2024-10-03 NOTE — ED ADULT NURSE NOTE - OBJECTIVE STATEMENT
64y Male PMHx BPH presenting to ED via walk-in triage for urinary retention. Pt states over the past few weeks he's been having worsening abd distention and LE edema. Pt was admitted to [facility] in July with urinary retention, YG and b/l hydronephrosis. Pt states since his symptoms began he has been able to urinate however followed up with urologist last week and was referred to ED for urinary retention. Pt denies fever/chills, chest pain, shortness of breath, abdominal pain, vomiting, diarrhea. Indwelling Pratt catheter placed as per MD order; 2 RNs at bedside during insertion; sterile technique utilized and maintained. Catheter securement device placed, catheter draining to gravity, 2700cc clear yellow urine drained. Pt tolerated well. IV placed, labs drawn and sent, VSS, seen and eval by MD. Stretcher in lowest position and locked, call bell within reach. 64y Male PMHx BPH presenting to ED via walk-in triage for urinary retention. Pt states over the past few weeks he's been having worsening abd distention and LE edema. Pt was admitted to [facility] in July with urinary retention, YG and b/l hydronephrosis. Pt states since his symptoms began he has been able to urinate however followed up with urologist last week and was referred to ED for urinary retention. Pt denies fever/chills, chest pain, shortness of breath, abdominal pain, vomiting, diarrhea. Indwelling Pratt catheter placed as per MD order; 2 RNs at bedside during insertion; sterile technique utilized and maintained. Catheter securement device placed, catheter draining to gravity, 2700cc clear yellow urine drained. Pt tolerated well. Pt refusing cardiac monitoring, "my heart is fine." THAO Lei made aware, IV placed, labs drawn and sent, VSS, seen and eval by MD. Stretcher in lowest position and locked, call bell within reach. 64y Male PMHx BPH presenting to ED via walk-in triage for urinary retention. Pt states over the past few weeks he's been having worsening abd distention and LE edema. Pt was admitted to [facility] in July with urinary retention, YG and b/l hydronephrosis. Pt states since his symptoms began he has been able to urinate however followed up with urologist last week and was referred to ED for urinary retention. Pt denies fever/chills, chest pain, shortness of breath, abdominal pain, vomiting, diarrhea. Indwelling Pratt catheter placed as per MD order; 16F coude used 2 RNs at bedside during insertion; sterile technique utilized and maintained. Catheter securement device placed, catheter draining to gravity, 2700cc clear yellow urine drained. Pt tolerated well. Pt refusing cardiac monitoring, "my heart is fine." THAO Lei made aware, IV placed, labs drawn and sent, VSS, seen and eval by MD. Stretcher in lowest position and locked, call bell within reach.

## 2024-10-03 NOTE — CONSULT NOTE ADULT - ASSESSMENT
65 yo M with hx of obstructive uropathy due to BPH with recent admission to [facility] as per patient here for difficulty urinating. Nephrology called for YG and Hyperkalemia    YG on CKD  Baseline reportedly 2.4 in July 2023.   Recently SCR was 4.8 on 09/2024  Pw with SCR 5.7 now 5.6  Etiology Likely Obstructive Uropathy, Bedside POCUS show Bilateral hydronephrosis with flat IVC  Send Urine Na and Urine Creatinine   Patient s/p 2.7 L urine output after insertion of silva   Monitor urine output  HD was discussed with patient however he refuses to consent for procedure  He does not wish to do dialysis  No indication of RRT at this time  C.w bicarb drip at 75 cc/hour  Renally dose meds per GFR  Strict Is/Os  Monitor for post obstructive diuresis    Hyperkalemia   Initially 6.2 now 6  Repeat insulin D50 and Calcium gluconate  Can give LOKELMA 10 grams once now  Serial ekgs    Acidosis  Likely in the setting of renal failure  C/w bicarb drip    Anemia  Hgb of 7.5  Can send Iron TIBC and Ferritin Levels    Obstructive Uropathy due to BPH  Obtain Urology consult for further management while inpatient   Maintain Silva

## 2024-10-03 NOTE — H&P ADULT - NSHPSOCIALHISTORY_GEN_ALL_CORE
No tobacco, ethanol or recreational substance use.   , with patient identifying ex spouse, Tatum Love, [phone], as his ojsi care surrogate.

## 2024-10-03 NOTE — PATIENT PROFILE ADULT - LIVING ENVIRONMENT
Dr. Story recommendations for a prior history of preeclampsia, cerclage in past 3 pregnancy,   21w5d     Cell Free-negative     Medications:  Not using anti-nausea medication   Prometrium 100 mg capsule placed into vagina each night at bedtime, now through 36 weeks 6 days     Use a condom for sexual intercourse or place capsule after intercourse     Baseline Labs (CMP, CBC, protein/cr ratio)-done     The following recommendations made to help prevent preeclampsia are as follows:  1)  Moderate exercise of at least 140 minutes per week, sufficient to raise the heart rate and allow speaking but not singing   2)  Aspirin 81 mg (2 tabs) daily-  3)  If calcium intake less than 900 mg/day, initiate calcium supplementation of at least 500 mg/day      Beginning at 20 weeks start home blood pressure monitoring   Check blood pressure daily after 20 weeks gestation.  If your blood pressure is higher than or equal to 140/90, check it again 4 hours later if still elevated please call the office. [facility] number [phone]  Monitor for following symptoms:  Headache not relieved with medication, severe headache, vision changes, right upper quadrant pain or epigastric pain, nausea, or vomiting. Call with any of the listed symptoms.       Ultrasounds:  Weekly cervical lengths scans from 16-24 weeks (not on Tues or Fri)  Plan cerclage if cervical length is less than 2.5 cm  Growth every 3-4 weeks   32 weeks begin weekly BPP     Recommend delivery between 39 weeks 0 days and 39 weeks 4 days       Continue to follow up with your primary OB for your routine prenatal care   no

## 2024-10-03 NOTE — H&P ADULT - ASSESSMENT
NIGHT HOSPITALIST:    NIGHT HOSPITALIST:     Self referral by patient with several weeks of B/L LE oedema and urinary retention symptoms in the setting of patient with poor compliance with followup and recommendations by his physicians with now S/P Pratt placement and HCO3 gtt.    Patient refuses to resume his Proscar and Finasteride.    Unclear to the rationale in the ER for calcium gluconate with NO hyperacute T waves on EKG.   Will repeat EKG in the AM.   Patient is noncompliant with telemetry.    Patient with inferior Q waves and poor R wave progression on EKG with B/L LE oedema.   Suspected troponin leak.  Will obtain limited echo.    Patient refuses pharmacologic DVT prophylaxis.   Will obtain B/L Duplex to exclude occult DVT.

## 2024-10-03 NOTE — H&P ADULT - NSHPLABSRESULTS_GEN_ALL_CORE
WBC 4.9      65N    Hgb 7.5  (H/H was 8.7/27.1 in July 2024)    Platelets of 214K    HS troponin 46>>43    BNP 6322    Random glucose 92>>  226 (S/P D50W and insulin in the ER)    K+ 6,2>> 6.0  past hyperkalemia Rx and Lokelma.    Cr 5.72>> 5.6    Cr was 2.41 in July 2024.    Chest radiograph interpreted by me with no infiltrate or effusion.      CTT abdomen 7/9/24 with thickened bladder wall thickennig.    UA with small leuk but NO nitrite.  30 Pro. WBC 4.9      65N    Hgb 7.5  (H/H was 8.7/27.1 in July 2024)    Platelets of 214K    HS troponin 46>>43    BNP 6322    Random glucose 92>>  226 (S/P D50W and insulin in the ER)    K+ 6,2>> 6.0  past hyperkalemia Rx and Lokelma.    Cr 5.72>> 5.6    Cr was 2.41 in July 2024.    Chest radiograph interpreted by me with no infiltrate or effusion.      CTT abdomen 7/9/24 with thickened bladder wall thickening.    UA with small leuk but NO nitrite.  30 Pro.    EKG tracing interpreted by me with NSR a t74 with Q III, F and poor anterior R wave progression.  NO non-geographic hyperacute T waves.

## 2024-10-03 NOTE — H&P ADULT - NSHPADDITIONALINFOADULT_GEN_ALL_CORE
NIGHT HOSPITALIST:    Patient aware of course and agrees with plan/care as above.   Given patient's comorbidities, patient's long term prognosis is guarded.   Emotional support provided to patient.   The patient did not wish for me to contact family at this hour.   Care reviewed with covering NP/PA for endorsement to Dr. Barone.    Bandar West MD  Available on [employer] Teams. NIGHT HOSPITALIST:    Patient aware of course and agrees with plan/care as above.   Given patient's comorbidities, patient's long term prognosis is guarded.   Emotional support provided to patient.   The patient did not wish for me to contact family at this hour.   Care reviewed with covering NP/PA Ladonna,  for endorsement to Dr. Barone.    Bandar West MD  Available on [employer] Teams.

## 2024-10-03 NOTE — ED PROVIDER NOTE - CRITICAL CARE ATTENDING CONTRIBUTION TO CARE
97.8
Attending MD Bhargavi Love:  I personally made/approved the management plan and take responsibility for the patient management.  Physician assistant note reviewed and agree on plan of care and except where noted.  See HPI, PE, and MDM for details.

## 2024-10-03 NOTE — GOALS OF CARE CONVERSATION - ADVANCED CARE PLANNING - TREATMENT GUIDELINE COMMENT
The patient wishes to maintain FULL Cardiopulmonary support and wishes no limitation in medical intervention.

## 2024-10-03 NOTE — H&P ADULT - PROBLEM SELECTOR PLAN 3
Patient with inferior Q waves and poor R wave progression on EKG with B/L LE oedema.   Suspected troponin leak.  Will obtain limited echo.

## 2024-10-03 NOTE — PROVIDER CONTACT NOTE (OTHER) - ACTION/TREATMENT ORDERED:
Dr. West notified and made aware. patient is off tele. Dr. Wets notified and made aware. patient is off tele. patient educated on importance and use of telemetry monitoring.

## 2024-10-03 NOTE — H&P ADULT - HISTORY OF PRESENT ILLNESS
NIGHT HOSPITALIST:    Patient UNKNOWN to me previously, assigned to me at this point via the ER and by Dr. Barone to admit this 65 y/o M--apparently initially admitted to [facility] and discharged July 16 2024 for suprapubic pain, apparently with 2 months of preceding urinary incontinence at that time, noted to have acute kidney injury and obstructive uropathy, with Pratt placement at the time, with patient seen by Urology at the time, with recommendation for outpatient Urology followup, with discharge and evaluation by Dr. Florian Bianchi of Urology (seen 7/22/24) with his Pratt discontinued at the office, with patient then with Against Medical Advice at the time, with inpatient referral, with patient then self referring to [facility] again for urinary retention, with readmission with acute kidney injury, with management of hyperkalemia and HCO3 gtt provided during that hospitalisation, with patient discharged Against Medical Advice July 24 2024 with Pratt in place.   Apparently patient self refers to an urgent care center (patient sees no regular physician and has not followed up with Urology during this interval) with patient's Pratt has been discontinued since, with patient reports to me that he stopped the Proscar and finasteride, verbalizing, "I do not believe in medications".    Patient again feels less bladder distention since Pratt placed, with patient receiving treatment for hyperkalemia in the ER.   Denies fever, chills, rigors.   NO chest pain/pressure.  NO palpitations.   NO abdominal pain, no red blood per rectum or melena.   NO back pain, no tearing back pain.   NO anorexia.   NO N/V.

## 2024-10-03 NOTE — GOALS OF CARE CONVERSATION - ADVANCED CARE PLANNING - CONVERSATION DETAILS
Patient is a 65 y/o M with irregular physician followup with recurrent obstructive uropathy with Pratt placement and removal and intervention for acute kidney over chronic renal injury.    Patient presents for B/L LE oedema and urinary retention, with Pratt replaced in the ER and intervention for acute kidney over chronic injury in conjunction with Nephrology.  Patient not eager for renal replacement therapy.    The patient wishes to maintain FULL Cardiopulmonary support and wishes no limitation in medical intervention.    I have spent a total of 16 minutes reviewing Advance Care planning with the patient.

## 2024-10-03 NOTE — ED PROVIDER NOTE - PROGRESS NOTE DETAILS
Patient with obstructive uropathy with YG on CKD.  Hyperkalemia to 6.2.  Patient medicated.  K downtrending.  Nephrology consulted.  Will admit to medicine for further management. -Alfredo Lei PA-C Attending Bhargavi Love: silva placed with sig urine drainage. blood work showed woresning al, hyperkalemia. nephrology consulted suspect likely post obstructive. will treat hyperkalemia, nephrology requesting bicarb infusion. no emergent dialysis at this time.

## 2024-10-03 NOTE — PATIENT PROFILE ADULT - FALL HARM RISK - RISK INTERVENTIONS
Assistance OOB with selected safe patient handling equipment/Assistance with ambulation/Communicate Fall Risk and Risk Factors to all staff, patient, and family/Discuss with provider need for PT consult/Monitor gait and stability/Reinforce activity limits and safety measures with patient and family/Reorient to person, place and time as needed/Review medications for side effects contributing to fall risk/Sit up slowly, dangle for a short time, stand at bedside before walking/Toileting schedule using arm’s reach rule for commode and bathroom/Visual Cue: Yellow wristband/Bed in lowest position, wheels locked, appropriate side rails in place/Call bell, personal items and telephone in reach/Instruct patient to call for assistance before getting out of bed or chair/Non-slip footwear when patient is out of bed/[place] to call system/Physically safe environment - no spills, clutter or unnecessary equipment/Purposeful Proactive Rounding/Room/bathroom lighting operational, light cord in reach

## 2024-10-03 NOTE — H&P ADULT - NSHPSOURCEINFOTX_GEN_ALL_CORE
Patient reports he takes no medications, verbalizing "I do not believe in medications".     Patient's ex-spouse, Tatum Love, [phone], patient identifies as his surrogate and the patient did not wish for me to contact at this hour.

## 2024-10-03 NOTE — H&P ADULT - PROBLEM SELECTOR PLAN 1
Self referral by patient with several weeks of B/L LE oedema and urinary retention symptoms in the setting of patient with poor compliance with followup and recommendations by his physicians with now S/P Pratt placement and HCO3 gtt.    Patient refuses to resume his Proscar and Finasteride.    Unclear to the rationale in the ER for calcium gluconate with NO hyperacute T waves on EKG.   Will repeat EKG in the AM.   Patient is noncompliant with telemetry.

## 2024-10-03 NOTE — ED PROVIDER NOTE - CARE PLAN
Principal Discharge DX:	Obstructive uropathy  Secondary Diagnosis:	Hyperkalemia   1 Principal Discharge DX:	Renal failure  Secondary Diagnosis:	Hyperkalemia

## 2024-10-03 NOTE — CONSULT NOTE ADULT - SUBJECTIVE AND OBJECTIVE BOX
[facility]    Dr. Nikole Paul     Office [phone] (9 am to 5 pm)  Service : [phone] ( 5pm to 9 am)          RENAL INITIAL CONSULT NOTE: DATE OF SERVICE 10-03-24 @ 13:58    HPI: 64-year-old male with past medical history of BPH presenting with urinary retention.  Patient reports that he was admitted to [facility] in July with urinary retention complicated by YG from obstructive uropathy with bilateral hydronephrosis.  Patient had Silva catheter placed and was discharged to follow-up with urology.  Patient was discharged on finasteride and tamsulosin, but states he has not been taking it.  Over the past few weeks patient has noted worsening abdominal distention, now associated with lower extremity edema.  Patient states that he has been able to urinate.  Patient followed up with his urologist last week and was told that he needed to come to the emergency room for urinary retention.  Patient otherwise states he is feeling well.  Denies fever/chills, chest pain, shortness of breath, abdominal pain, vomiting, diarrhea.  Creatinine upon discharge from [facility] on  was 2.4.    Nephrology called at bedside for evaluation. Patient denies any SOB, CP. Noted that on OP labs SCR was 4.89 and K was 5.6 on . Patient states he was notified however unclear whether he received any prescription for Hyperkalemia. He does not take any NSAIDs or any other meds at home.       Allergies:  No Known Allergies      PAST MEDICAL & SURGICAL HISTORY:      Home Medications Reviewed    Hospital Medications:   MEDICATIONS  (STANDING):  sodium bicarbonate  Infusion 0.146 mEq/kG/Hr (75 mL/Hr) IV Continuous <Continuous>      SOCIAL HISTORY:  Denies ETOh, Smoking,     FAMILY HISTORY:      REVIEW OF SYSTEMS:  CONSTITUTIONAL: No weakness, fevers or chills  EYES/ENT: No visual changes;  No vertigo or throat pain   NECK: No pain or stiffness  RESPIRATORY: No cough, wheezing, hemoptysis; No shortness of breath  CARDIOVASCULAR: No chest pain or palpitations.  GASTROINTESTINAL: No abdominal or epigastric pain. No nausea, vomiting, or hematemesis; No diarrhea or constipation. No melena or hematochezia.  GENITOURINARY: No dysuria, frequency, foamy urine, urinary urgency, incontinence or hematuria  NEUROLOGICAL: No numbness or weakness  SKIN: No itching, burning, rashes, or lesions   VASCULAR: No bilateral lower extremity edema.   All other review of systems is negative unless indicated above.    VITALS:  T(F): 98 (10-03-24 @ 11:20), Max: 98 (10-03-24 @ 11:20)  HR: 69 (10-03-24 @ 11:20)  BP: 129/89 (10-03-24 @ 11:20)  RR: 18 (10-03-24 @ 11:20)  SpO2: 100% (10-03-24 @ 11:20)  Wt(kg): --    Height (cm): 182.9 (10-03 @ 09:36)  Weight (kg): 77.1 (10-03 @ 09:36)  BMI (kg/m2): 23 (10-03 @ 09:)  BSA (m2): 1.99 (10-03 @ 09:36)    PHYSICAL EXAM:  Constitutional: NAD  HEENT: anicteric sclera, oropharynx clear, MMM  Neck: No JVD  Respiratory: CTAB, no wheezes, rales or rhonchi  Cardiovascular: S1, S2, RRR  Gastrointestinal: BS+, soft, NT/ND  Extremities: No cyanosis or clubbing. No peripheral edema  Neurological: A/O x 3, no focal deficits  Psychiatric: Normal mood, normal affect  : No CVA tenderness. No islva.   Skin: No rashes  Vascular Access:    LABS:  10-03    141  |  110[H]  |  85[H]  ----------------------------<  92  6.2[HH]   |  15[L]  |  5.72[H]    Ca    9.2      03 Oct 2024 10:56  Mg     2.0     10-03    TPro  7.4  /  Alb  3.9  /  TBili  0.2  /  DBili      /  AST  <5[L]  /  ALT  11  /  AlkPhos  63  10-03    Creatinine Trend: 5.72 <--                        7.5    4.95  )-----------( 214      ( 03 Oct 2024 10:56 )             24.4     Urine Studies:  Urinalysis Basic - ( 03 Oct 2024 10:57 )    Color: Yellow / Appearance: Clear / S.009 / pH:   Gluc:  / Ketone: Negative mg/dL  / Bili: Negative / Urobili: 0.2 mg/dL   Blood:  / Protein: 30 mg/dL / Nitrite: Negative   Leuk Esterase: Small / RBC: 2 /HPF / WBC 34 /HPF   Sq Epi:  / Non Sq Epi:  / Bacteria:       Sodium, Random Urine: 67 mmol/L (10-03 @ 10:57)  Creatinine, Random Urine: 39 mg/dL (10-03 @ 10:57)  Protein/Creatinine Ratio Calculation: 0.5 Ratio (10-03 @ 10:57)  Osmolality, Random Urine: 315 mos/kg (10-03 @ 10:57)  Potassium, Random Urine: 27 mmol/L (10-03 @ 10:57)      RADIOLOGY & ADDITIONAL STUDIES:

## 2024-10-03 NOTE — H&P ADULT - NSICDXPASTMEDICALHX_GEN_ALL_CORE_FT
PAST MEDICAL HISTORY:  Acute kidney injury superimposed on CKD     COVID-19 vaccination refused     Lower extremity edema     Obstructive uropathy

## 2024-10-03 NOTE — ED PROVIDER NOTE - PHYSICAL EXAMINATION
Gen: AAO x 3, NAD  Skin:pale  HEENT: NC/AT, PERRLA, EOMI, MMM  Resp: unlabored CTAB  Cardiac: rrr s1s2, no murmurs, rubs or gallops  GI: +suprapubic distention/fullness, soft, nontender  Ext: 2+ pedal edema, FROM in all extremities  Neuro: no focal deficits Gen: AAO x 3, NAD  Skin:pale  HEENT: NC/AT, PERRLA, EOMI, MMM  Resp: unlabored CTAB  Cardiac: rrr s1s2, no murmurs, rubs or gallops  GI: +suprapubic distention/fullness, soft, nontender  Ext: 2+ pedal edema, FROM in all extremities  Neuro: no focal deficits  Attending Bhargavi Love: Gen: NAD, heent: atrauamtic, eomi, perrla, mmm, op pink conjunctiva pale, uvula midline, neck; nttp, no nuchal rigidity, chest: nttp, no crepitus, cv: rrr,  lungs: ctab, abd: soft, suprapbic fullness,  no guarding, ext: wwp, bl LE pitting edema, skin: no rash, neuro: awake and alert, following commands, speech clear, sensation and strength intact, no focal deficits

## 2024-10-03 NOTE — ED PROVIDER NOTE - CLINICAL SUMMARY MEDICAL DECISION MAKING FREE TEXT BOX
Attending Bhargavi Love: 63 yo male with h/o CKD, presenting with concern for LE swelling. pt with 2 mrn's reviewed prior chart from [facility]. upon arrival pt hemodynaically stable. pocus performed showing sig urinary retention, with evidence of severe hydronephrosis. with sig urinary retention concern for post obstructive cause of renal dysufinction. silva catheter ordered. blood work obtained showing worsening kidney function and hyperkalemia. pt given insulin, dextrose and lokelma. nephrology consulted. pt with LE edema, suspect likely from renal dysfunction, pocus with preserved ef with IVC with respiratory variation. will send urine lyjenn, noni nephrology, nmonitor urine output.

## 2024-10-03 NOTE — ED PROVIDER NOTE - OBJECTIVE STATEMENT
64-year-old male with past medical history of BPH presenting with urinary retention.  Patient reports that he was admitted to [facility] in July with urinary retention complicated by YG from obstructive uropathy with bilateral hydronephrosis.  Patient had Pratt catheter placed and was discharged to follow-up with urology.  Patient was discharged on finasteride and tamsulosin, but states he has not been taking it.  Over the past few weeks patient has noted worsening abdominal distention, now associated with lower extremity edema.  Patient states that he has been able to urinate.  Patient followed up with his urologist last week and was told that he needed to come to the emergency room for urinary retention.  Patient otherwise states he is feeling well.  Denies fever/chills, chest pain, shortness of breath, abdominal pain, vomiting, diarrhea.  Creatinine upon discharge from [facility] on July 23 was 2.4. 64-year-old male with past medical history of BPH presenting with urinary retention.  Patient reports that he was admitted to [facility] in July with urinary retention complicated by YG from obstructive uropathy with bilateral hydronephrosis.  Patient had Pratt catheter placed and was discharged to follow-up with urology.  Patient was discharged on finasteride and tamsulosin, but states he has not been taking it.  Over the past few weeks patient has noted worsening abdominal distention, now associated with lower extremity edema.  Patient states that he has been able to urinate.  Patient followed up with his urologist last week and was told that he needed to come to the emergency room for urinary retention.  Patient otherwise states he is feeling well.  Denies fever/chills, chest pain, shortness of breath, abdominal pain, vomiting, diarrhea.  Creatinine upon discharge from [facility] on July 23 was 2.4.  Attending Bhargavi Love: 64-year-old male with history of difficulty urinating in the past was recently admitted to [facility] back in July for urinary retention and YG presenting with concern for difficulty urinating.  Patient noticed that he has been having difficulty urinating and now developed swelling to his legs.  No fevers or chills.  Also reports feeling fairly fatigued.  No black or bloody stools.  Patient was told recently that his kidney function was getting worse.  Patient was previously on medication to help improve his prostate however has stopped taking it.  No black or bloody stools patient denies any fevers or chills.  No recent falls or any trauma.  No pain with inspiration.

## 2024-10-04 ENCOUNTER — RESULT REVIEW (OUTPATIENT)
Age: 64
End: 2024-10-04

## 2024-10-04 LAB
ANION GAP SERPL CALC-SCNC: 17 MMOL/L — SIGNIFICANT CHANGE UP (ref 5–17)
BASOPHILS # BLD AUTO: 0.01 K/UL — SIGNIFICANT CHANGE UP (ref 0–0.2)
BASOPHILS NFR BLD AUTO: 0.2 % — SIGNIFICANT CHANGE UP (ref 0–2)
BUN SERPL-MCNC: 76 MG/DL — HIGH (ref 7–23)
CALCIUM SERPL-MCNC: 9.2 MG/DL — SIGNIFICANT CHANGE UP (ref 8.4–10.5)
CHLORIDE SERPL-SCNC: 107 MMOL/L — SIGNIFICANT CHANGE UP (ref 96–108)
CO2 SERPL-SCNC: 18 MMOL/L — LOW (ref 22–31)
CREAT SERPL-MCNC: 5.16 MG/DL — HIGH (ref 0.5–1.3)
EGFR: 12 ML/MIN/1.73M2 — LOW
EOSINOPHIL # BLD AUTO: 0.11 K/UL — SIGNIFICANT CHANGE UP (ref 0–0.5)
EOSINOPHIL NFR BLD AUTO: 2 % — SIGNIFICANT CHANGE UP (ref 0–6)
FERRITIN SERPL-MCNC: 319 NG/ML — SIGNIFICANT CHANGE UP (ref 30–400)
GLUCOSE SERPL-MCNC: 129 MG/DL — HIGH (ref 70–99)
HCT VFR BLD CALC: 26.8 % — LOW (ref 39–50)
HGB BLD-MCNC: 8.2 G/DL — LOW (ref 13–17)
IMM GRANULOCYTES NFR BLD AUTO: 0.4 % — SIGNIFICANT CHANGE UP (ref 0–0.9)
IRON SATN MFR SERPL: 15 % — LOW (ref 16–55)
IRON SATN MFR SERPL: 53 UG/DL — SIGNIFICANT CHANGE UP (ref 45–165)
LYMPHOCYTES # BLD AUTO: 0.72 K/UL — LOW (ref 1–3.3)
LYMPHOCYTES # BLD AUTO: 13 % — SIGNIFICANT CHANGE UP (ref 13–44)
MCHC RBC-ENTMCNC: 29.3 PG — SIGNIFICANT CHANGE UP (ref 27–34)
MCHC RBC-ENTMCNC: 30.6 GM/DL — LOW (ref 32–36)
MCV RBC AUTO: 95.7 FL — SIGNIFICANT CHANGE UP (ref 80–100)
MONOCYTES # BLD AUTO: 0.43 K/UL — SIGNIFICANT CHANGE UP (ref 0–0.9)
MONOCYTES NFR BLD AUTO: 7.7 % — SIGNIFICANT CHANGE UP (ref 2–14)
NEUTROPHILS # BLD AUTO: 4.26 K/UL — SIGNIFICANT CHANGE UP (ref 1.8–7.4)
NEUTROPHILS NFR BLD AUTO: 76.7 % — SIGNIFICANT CHANGE UP (ref 43–77)
NRBC # BLD: 0 /100 WBCS — SIGNIFICANT CHANGE UP (ref 0–0)
PLATELET # BLD AUTO: 222 K/UL — SIGNIFICANT CHANGE UP (ref 150–400)
POTASSIUM SERPL-MCNC: 4.6 MMOL/L — SIGNIFICANT CHANGE UP (ref 3.5–5.3)
POTASSIUM SERPL-SCNC: 4.6 MMOL/L — SIGNIFICANT CHANGE UP (ref 3.5–5.3)
RBC # BLD: 2.8 M/UL — LOW (ref 4.2–5.8)
RBC # FLD: 16 % — HIGH (ref 10.3–14.5)
SODIUM SERPL-SCNC: 142 MMOL/L — SIGNIFICANT CHANGE UP (ref 135–145)
TIBC SERPL-MCNC: 363 UG/DL — SIGNIFICANT CHANGE UP (ref 220–430)
UIBC SERPL-MCNC: 311 UG/DL — SIGNIFICANT CHANGE UP (ref 110–370)
WBC # BLD: 5.55 K/UL — SIGNIFICANT CHANGE UP (ref 3.8–10.5)
WBC # FLD AUTO: 5.55 K/UL — SIGNIFICANT CHANGE UP (ref 3.8–10.5)

## 2024-10-04 PROCEDURE — 93970 EXTREMITY STUDY: CPT | Mod: 26

## 2024-10-04 PROCEDURE — 93010 ELECTROCARDIOGRAM REPORT: CPT

## 2024-10-04 PROCEDURE — 93306 TTE W/DOPPLER COMPLETE: CPT | Mod: 26

## 2024-10-04 RX ORDER — SODIUM CHLORIDE 0.9 % (FLUSH) 0.9 %
1000 SYRINGE (ML) INJECTION
Refills: 0 | Status: DISCONTINUED | OUTPATIENT
Start: 2024-10-04 | End: 2024-10-05

## 2024-10-04 RX ORDER — SODIUM BICARBONATE 650 MG
650 TABLET ORAL
Refills: 0 | Status: DISCONTINUED | OUTPATIENT
Start: 2024-10-04 | End: 2024-10-07

## 2024-10-04 RX ADMIN — Medication 75 MEQ/KG/HR: at 05:16

## 2024-10-04 RX ADMIN — Medication 650 MILLIGRAM(S): at 18:10

## 2024-10-04 NOTE — CONSULT NOTE ADULT - ATTENDING COMMENTS
given the severity of the bladder outlet obstruction and YG on top of CKD, recommend bladder outlet procedure to improve bladder emptying and reduce likelihood of retention  will attempt to arrange while inpatient

## 2024-10-04 NOTE — CHART NOTE - NSCHARTNOTEFT_GEN_A_CORE
Urology consult called for Urinary retention and Bilateral Hydronephrosis seen on POCUS in ED.  Pratt was placed in ED.  Team has agreed to see patient.

## 2024-10-04 NOTE — CONSULT NOTE ADULT - SUBJECTIVE AND OBJECTIVE BOX
UROLOGY CONSULT NOTE    HPI:  This is a 64y old Male with CKD, bladder outlet obstruction with recent urinary retention who was admitted for YG and found to have urinary retention with bilateral hydronephrosis.   The patient states that he came in because his abdomen was increasing in size and he had bilateral leg swelling.  He had a catheter placed draining 2.7L and was admitted for YG - Cr 5.72 (Cr 2.8 upon discharge from prior admission).  In July, the patient developed urinary retention (3L) with bilateral hydronephrosis and YG that improved to 2.8.  CT showed a trabeculated bladder and an enlarged prostate with prominent median lobe hypertrophy.  He was discharged with a silva catheter and followed up with the urologist for cystoscopy and possible trial of void.  However, per office notes, upon catheter removal, the patient refused cystoscopy as well as trial of void and left the visit.  He states today that he is pain-averse and this was the main reason for not following through.  He denies any flank pain, hematuria, dysuria, fevers, chills.  States his abdominal and bilateral leg swelling have improved since admission.      PAST MEDICAL HISTORY    Acute kidney injury superimposed on CKD    Obstructive uropathy    Lower extremity edema    COVID-19 vaccination refused        PAST SURGICAL HISTORY    No significant past surgical history        FAMILY HISTORY    No pertinent family history in first degree relatives        SOCIAL HISTORY    denies smoking history      HOME MEDICATIONS    finasteride 5 mg oral tablet: 1 tab(s) orally once a day (03 Oct 2024 16:16)  tamsulosin 0.4 mg oral capsule: 1 cap(s) orally once a day (at bedtime) (03 Oct 2024 16:16)      DRUG ALLERGIES    No Known Allergies      REVIEW OF SYSTEMS: Pertinent positives and negatives as stated in HPI, otherwise negative      VITAL SIGNS    T(F): 98.2, Max: 98.6 (10-04-24 @ 08:24)  HR: 86  BP: 159/84  RR: 18  SpO2: 100%    I's & O's      03 Oct 2024 07:01  -  04 Oct 2024 07:00  --------------------------------------------------------  IN: 0 mL / OUT: 4600 mL / NET: -4600 mL    04 Oct 2024 07:01  -  04 Oct 2024 12:26  --------------------------------------------------------  IN: 600 mL / OUT: 0 mL / NET: 600 mL        PHYSICAL EXAM    Gen: Well groomed, well dressed, well nourished  Abd: Soft, NT/ND  Back: no CVAT bilaterally  Ext: No edema present b/l      LABS:                        8.2    5.55  )-----------( 222               26.8     142  |  107  |  76  ----------------------------<  129  4.6   |  18  |  5.16    Ca    9.2  Mg     2.0    TPro  7.0  /  Alb  3.8  /  TBili  0.1  /  DBili  x   /  AST  <5  /  ALT  11  /  AlkPhos  61    PT: 10.5 sec;   INR: 0.91 ratio  PTT:31.1 sec  CARDIAC MARKERS ( 03 Oct 2024 13:24 )  x     / x     / x     / x     / 3.1 ng/mL  CARDIAC MARKERS ( 03 Oct 2024 10:56 )  x     / x     / x     / x     / 3.3 ng/mL      Urinalysis Basic:    Color: x / Appearance: x / SG: x / pH: x  Gluc: 129 mg/dL / Ketone: x  / Bili: x / Urobili: x   Blood: x / Protein: x / Nitrite: x   Leuk Esterase: x / RBC: x / WBC x   Sq Epi: x / Non Sq Epi: x / Bacteria: x        IMAGING:    Renal US: Severe present in the left and right kidneys.  Bladder is distended with approximately 1.8L of urine present   UROLOGY CONSULT NOTE    HPI:  This is a 64y old Male with CKD, bladder outlet obstruction with recent urinary retention who was admitted for YG and found to have urinary retention with bilateral hydronephrosis.   The patient states that he came in because his abdomen was increasing in size and he had bilateral leg swelling.  He had a catheter placed draining 2.7L and was admitted for YG - Cr 5.72 (Cr 2.8 upon discharge from prior admission).  In July, the patient developed urinary retention (3L) with bilateral hydronephrosis and YG that improved to 2.8.  CT showed a trabeculated bladder and an enlarged prostate with prominent median lobe hypertrophy.  He was discharged with a silva catheter and followed up with the urologist for cystoscopy and possible trial of void.  However, per office notes, upon catheter removal, the patient refused cystoscopy as well as trial of void and left the visit.  He states today that he is pain-averse and this was the main reason for not following through.  He denies any flank pain, hematuria, dysuria, fevers, chills.  States his abdominal and bilateral leg swelling have improved since admission.      PAST MEDICAL HISTORY    Acute kidney injury superimposed on CKD    Obstructive uropathy    Lower extremity edema    COVID-19 vaccination refused        PAST SURGICAL HISTORY    No significant past surgical history        FAMILY HISTORY    No pertinent family history in first degree relatives        SOCIAL HISTORY    denies smoking history      HOME MEDICATIONS    finasteride 5 mg oral tablet: 1 tab(s) orally once a day (03 Oct 2024 16:16)  tamsulosin 0.4 mg oral capsule: 1 cap(s) orally once a day (at bedtime) (03 Oct 2024 16:16)      DRUG ALLERGIES    No Known Allergies      REVIEW OF SYSTEMS: Pertinent positives and negatives as stated in HPI, otherwise negative      VITAL SIGNS    T(F): 98.2, Max: 98.6 (10-04-24 @ 08:24)  HR: 86  BP: 159/84  RR: 18  SpO2: 100%    I's & O's      03 Oct 2024 07:01  -  04 Oct 2024 07:00  --------------------------------------------------------  IN: 0 mL / OUT: 4600 mL / NET: -4600 mL    04 Oct 2024 07:01  -  04 Oct 2024 12:26  --------------------------------------------------------  IN: 600 mL / OUT: 0 mL / NET: 600 mL        PHYSICAL EXAM    Gen: Well groomed, well dressed, well nourished  Abd: Soft, NT/ND  Back: no CVAT bilaterally  Ext: No edema present b/l      LABS:                        8.2    5.55  )-----------( 222               26.8     142  |  107  |  76  ----------------------------<  129  4.6   |  18  |  5.16    Ca    9.2  Mg     2.0    TPro  7.0  /  Alb  3.8  /  TBili  0.1  /  DBili  x   /  AST  <5  /  ALT  11  /  AlkPhos  61    PT: 10.5 sec;   INR: 0.91 ratio  PTT:31.1 sec  CARDIAC MARKERS ( 03 Oct 2024 13:24 )  x     / x     / x     / x     / 3.1 ng/mL  CARDIAC MARKERS ( 03 Oct 2024 10:56 )  x     / x     / x     / x     / 3.3 ng/mL      Urinalysis Basic:    Color: x / Appearance: x / SG: x / pH: x  Gluc: 129 mg/dL / Ketone: x  / Bili: x / Urobili: x   Blood: x / Protein: x / Nitrite: x   Leuk Esterase: x / RBC: x / WBC x   Sq Epi: x / Non Sq Epi: x / Bacteria: x        CURRENT IMAGING:    Renal US: Severe present in the left and right kidneys.  Bladder is distended with approximately 1.8L of urine present        PRIOR IMAGING (July 2024):    (7/7/24) CT scan:   -Severe bilateral hydroureteronephrosis. Left renal collecting system   appears duplicated. Bilateral ureteral tortuosity is noted, difficult to   evaluate due to lack of contrast. No clear evidence of obstructing   ureteral calculus.  -Marked lobulated bladder wall thickening despite underdistention   secondary to Silva catheter. Enlarged prostate with median lobe   hypertrophy. Presence or absence of bladder mass is not adequately   evaluated on this study. Correlate with urinalysis, urine cytology, PSA,   urology evaluation.  -Tip of the Silva catheter projects beyond the left posterolateral   bladder wall margin, possibly within a collapsed diverticulum or external   to the bladder. Correlate with Silva catheter function. Consider revision   of position as clinically warranted. This was discussed with Dr. Celestino Alvarado on 7/7/2024 at 11:24AM. CT cystogram can be obtained as clinically   required.  -Left para-aortic density measures 2.3 x 1.4 cm, image 95 series 301,   possibly a lymph node or of vascular etiology.  -Trace ascites.      (7/9/24) CT cystogram:  Enlarged prostate with prominent median lobe hypertrophy. No evidence of   bladder perforation.  Diffuse bladder wall thickening with circumferential trabeculated   appearance, likely in the setting of chronic bladder outlet obstruction.  Debris surrounding Silva catheter tip, possibly small blood products.   UROLOGY CONSULT NOTE    HPI:  This is a 64y old Male with CKD, bladder outlet obstruction with recent urinary retention who was admitted for YG and found to have urinary retention with bilateral hydronephrosis.   The patient states that he came in because his abdomen was increasing in size and he had bilateral leg swelling.  He had a catheter placed draining 2.7L and was admitted for YG - Cr 5.72 (Cr 2.8 upon discharge from prior admission).  In July, the patient developed urinary retention (3L) with bilateral hydronephrosis and YG that improved to 2.8.  CT showed a trabeculated bladder and an enlarged prostate with prominent median lobe hypertrophy.  He was discharged with a silva catheter and followed up with the urologist for cystoscopy and possible trial of void.  However, per office notes, upon catheter removal, the patient refused cystoscopy as well as trial of void and left the visit.  He states today that he is pain-averse and this was the main reason for not following through.  He denies any flank pain, hematuria, dysuria, fevers, chills.  States his abdominal and bilateral leg swelling have improved since admission.      PAST MEDICAL HISTORY    Acute kidney injury superimposed on CKD    Obstructive uropathy    Lower extremity edema    COVID-19 vaccination refused        PAST SURGICAL HISTORY    No significant past surgical history        FAMILY HISTORY    No pertinent family history in first degree relatives        SOCIAL HISTORY    denies smoking history      HOME MEDICATIONS    finasteride 5 mg oral tablet: 1 tab(s) orally once a day (03 Oct 2024 16:16)  tamsulosin 0.4 mg oral capsule: 1 cap(s) orally once a day (at bedtime) (03 Oct 2024 16:16)      DRUG ALLERGIES    No Known Allergies      REVIEW OF SYSTEMS: Pertinent positives and negatives as stated in HPI, otherwise negative      VITAL SIGNS    T(F): 98.2, Max: 98.6 (10-04-24 @ 08:24)  HR: 86  BP: 159/84  RR: 18  SpO2: 100%    I's & O's      03 Oct 2024 07:01  -  04 Oct 2024 07:00  --------------------------------------------------------  IN: 0 mL / OUT: 4600 mL / NET: -4600 mL    04 Oct 2024 07:01  -  04 Oct 2024 12:26  --------------------------------------------------------  IN: 600 mL / OUT: 0 mL / NET: 600 mL        PHYSICAL EXAM    Gen: Well groomed, well dressed, well nourished  Abd: Soft, NT/ND  : silva secured draining clear yellow urine  Back: no CVAT bilaterally  Ext: No edema present b/l      LABS:                        8.2    5.55  )-----------( 222               26.8     142  |  107  |  76  ----------------------------<  129  4.6   |  18  |  5.16    Ca    9.2  Mg     2.0    TPro  7.0  /  Alb  3.8  /  TBili  0.1  /  DBili  x   /  AST  <5  /  ALT  11  /  AlkPhos  61    PT: 10.5 sec;   INR: 0.91 ratio  PTT:31.1 sec  CARDIAC MARKERS ( 03 Oct 2024 13:24 )  x     / x     / x     / x     / 3.1 ng/mL  CARDIAC MARKERS ( 03 Oct 2024 10:56 )  x     / x     / x     / x     / 3.3 ng/mL      Urinalysis Basic:    Color: x / Appearance: x / SG: x / pH: x  Gluc: 129 mg/dL / Ketone: x  / Bili: x / Urobili: x   Blood: x / Protein: x / Nitrite: x   Leuk Esterase: x / RBC: x / WBC x   Sq Epi: x / Non Sq Epi: x / Bacteria: x        CURRENT IMAGING:    Renal US: Severe present in the left and right kidneys.  Bladder is distended with approximately 1.8L of urine present        PRIOR IMAGING (July 2024):    (7/7/24) CT scan:   -Severe bilateral hydroureteronephrosis. Left renal collecting system   appears duplicated. Bilateral ureteral tortuosity is noted, difficult to   evaluate due to lack of contrast. No clear evidence of obstructing   ureteral calculus.  -Marked lobulated bladder wall thickening despite underdistention   secondary to Silva catheter. Enlarged prostate with median lobe   hypertrophy. Presence or absence of bladder mass is not adequately   evaluated on this study. Correlate with urinalysis, urine cytology, PSA,   urology evaluation.  -Tip of the Silva catheter projects beyond the left posterolateral   bladder wall margin, possibly within a collapsed diverticulum or external   to the bladder. Correlate with Silva catheter function. Consider revision   of position as clinically warranted. This was discussed with Dr. Celestino Alvarado on 7/7/2024 at 11:24AM. CT cystogram can be obtained as clinically   required.  -Left para-aortic density measures 2.3 x 1.4 cm, image 95 series 301,   possibly a lymph node or of vascular etiology.  -Trace ascites.      (7/9/24) CT cystogram:  Enlarged prostate with prominent median lobe hypertrophy. No evidence of   bladder perforation.  Diffuse bladder wall thickening with circumferential trabeculated   appearance, likely in the setting of chronic bladder outlet obstruction.  Debris surrounding Silva catheter tip, possibly small blood products.

## 2024-10-04 NOTE — CONSULT NOTE ADULT - ASSESSMENT
64 year old male with urinary retention, YG, and bilateral hydronephrosis; known BPH and bladder outlet obstruction    -  -  -  -  - 64 year old male with urinary retention, YG, and bilateral hydronephrosis; known BPH and bladder outlet obstruction    Given patient's recent hospitalization at [facility] for similar presentation, would continue silva catheter while inpatient (and likely outpatient given significant distention compromising detrusor muscle ability to pass TOV), trend Cr, monitor for post-obstructive diuresis. Patient not amenable to medical management and has not taken flomax or proscar as prescribed during last admission because he "does not believe in taking drugs," but is amenable to outlet procedure either during this admission or through outpatient scheduling. Given patient's history of leaving AMA at outpatient appointment for discussion of outlet procedure, an inpatient outlet procedure may be in patient's best interest. Patient is not on any blood thinners and denies cardiac history.    Recommendations:  - Continue silva catheter  - Monitor I/O's for post-obstructive diuresis and decompression hematuria  - Continue to trend Cr  - Possible inpatient bladder outlet procedure   - If inpatient procedure not possible, anticipate discharge with silva with outpatient urology follow up to schedule outpatient outlet procedure  - Rest of care per primary team    D/w Dr. Edward Kapad[facility]  [address] [place] [address] [id number] [phone]   64 year old male with urinary retention, YG, and bilateral hydronephrosis; known BPH and bladder outlet obstruction    Given patient's recent hospitalization at [facility] for similar presentation, would continue silva catheter while inpatient and anticipate YG i/s/o obstruction at level of prostate that will likely resolve with continued silva. Patient not amenable to medical management and has not taken flomax or proscar as prescribed during last admission because he "does not believe in taking drugs," but is amenable to outlet procedure either during this admission or through outpatient scheduling. Given patient's history of leaving AMA at outpatient appointment for discussion of outlet procedure, an inpatient outlet procedure may be in patient's best interest. Patient is not on any blood thinners and denies cardiac history.    Recommendations:  - Continue silva catheter  - Monitor I/O's for post-obstructive diuresis and decompression hematuria  - Continue to trend Cr  - Possible inpatient bladder outlet procedure   - If inpatient procedure not possible, anticipate discharge with silva with outpatient urology follow up to schedule outpatient outlet procedure  - Rest of care per primary team    D/w Dr. Edward Kapadia [place] [facility]  [address] [place] [phone]

## 2024-10-04 NOTE — CHART NOTE - NSCHARTNOTEFT_GEN_A_CORE
Notified Dr Barone regarding positive acute DVT gastrocnemius vein. Will defer intravenous anticoagulation  for now; given patient history of  non-compliance with treatment.  Per Dr Barone repeat duplex in one week to assess for progression.     Ladonna Villafuerte NP  Department of Medicine   Spectra [phone]

## 2024-10-05 LAB
ANION GAP SERPL CALC-SCNC: 15 MMOL/L — SIGNIFICANT CHANGE UP (ref 5–17)
BUN SERPL-MCNC: 70 MG/DL — HIGH (ref 7–23)
CALCIUM SERPL-MCNC: 9.2 MG/DL — SIGNIFICANT CHANGE UP (ref 8.4–10.5)
CHLORIDE SERPL-SCNC: 110 MMOL/L — HIGH (ref 96–108)
CO2 SERPL-SCNC: 21 MMOL/L — LOW (ref 22–31)
CREAT SERPL-MCNC: 4.86 MG/DL — HIGH (ref 0.5–1.3)
EGFR: 12 ML/MIN/1.73M2 — LOW
GLUCOSE SERPL-MCNC: 90 MG/DL — SIGNIFICANT CHANGE UP (ref 70–99)
HCT VFR BLD CALC: 26.9 % — LOW (ref 39–50)
HGB BLD-MCNC: 8.3 G/DL — LOW (ref 13–17)
MCHC RBC-ENTMCNC: 29.9 PG — SIGNIFICANT CHANGE UP (ref 27–34)
MCHC RBC-ENTMCNC: 30.9 GM/DL — LOW (ref 32–36)
MCV RBC AUTO: 96.8 FL — SIGNIFICANT CHANGE UP (ref 80–100)
NRBC # BLD: 0 /100 WBCS — SIGNIFICANT CHANGE UP (ref 0–0)
PLATELET # BLD AUTO: 226 K/UL — SIGNIFICANT CHANGE UP (ref 150–400)
POTASSIUM SERPL-MCNC: 4.8 MMOL/L — SIGNIFICANT CHANGE UP (ref 3.5–5.3)
POTASSIUM SERPL-SCNC: 4.8 MMOL/L — SIGNIFICANT CHANGE UP (ref 3.5–5.3)
RBC # BLD: 2.78 M/UL — LOW (ref 4.2–5.8)
RBC # FLD: 15.9 % — HIGH (ref 10.3–14.5)
SODIUM SERPL-SCNC: 146 MMOL/L — HIGH (ref 135–145)
WBC # BLD: 5.32 K/UL — SIGNIFICANT CHANGE UP (ref 3.8–10.5)
WBC # FLD AUTO: 5.32 K/UL — SIGNIFICANT CHANGE UP (ref 3.8–10.5)

## 2024-10-05 PROCEDURE — 99222 1ST HOSP IP/OBS MODERATE 55: CPT

## 2024-10-05 RX ORDER — SODIUM CHLORIDE IRRIG SOLUTION 0.9 %
1000 SOLUTION, IRRIGATION IRRIGATION
Refills: 0 | Status: DISCONTINUED | OUTPATIENT
Start: 2024-10-05 | End: 2024-10-07

## 2024-10-05 RX ADMIN — Medication 650 MILLIGRAM(S): at 17:54

## 2024-10-05 RX ADMIN — Medication 50 MILLILITER(S): at 08:35

## 2024-10-05 RX ADMIN — Medication 60 MILLILITER(S): at 16:36

## 2024-10-05 RX ADMIN — Medication 650 MILLIGRAM(S): at 05:36

## 2024-10-05 NOTE — DIETITIAN INITIAL EVALUATION ADULT - PERTINENT MEDS FT
MEDICATIONS  (STANDING):  influenza  Vaccine (HIGH DOSE) 0.5 milliLiter(s) IntraMuscular once  sodium bicarbonate 650 milliGRAM(s) Oral two times a day  sodium chloride 0.9%. 1000 milliLiter(s) (50 mL/Hr) IV Continuous <Continuous>    MEDICATIONS  (PRN):  acetaminophen     Tablet .. 650 milliGRAM(s) Oral every 6 hours PRN Temp greater or equal to 38C (100.4F), Mild Pain (1 - 3)

## 2024-10-05 NOTE — DIETITIAN INITIAL EVALUATION ADULT - REASON FOR ADMISSION
Chart Reviewed, Events Noted  "Patient is a 64y old  Male who presents with a chief complaint of Several weeks of B/L LE oedema, and bladder distention"

## 2024-10-05 NOTE — CHART NOTE - NSCHARTNOTEFT_GEN_A_CORE
Notified by RN; patient refused labs, medications, IV insertion and cardiac monitoring. Education provided however patient continue to refuse treatment     Ladonna Villafuerte NP  Department of Medicine   Spectra [phone]

## 2024-10-05 NOTE — DIETITIAN INITIAL EVALUATION ADULT - PERTINENT LABORATORY DATA
10-05    146[H]  |  110[H]  |  70[H]  ----------------------------<  90  4.8   |  21[L]  |  4.86[H]    Ca    9.2      05 Oct 2024 07:56  Mg     2.0     10-03    TPro  7.0  /  Alb  3.8  /  TBili  0.1[L]  /  DBili  x   /  AST  <5[L]  /  ALT  11  /  AlkPhos  61  10-03

## 2024-10-05 NOTE — DIETITIAN INITIAL EVALUATION ADULT - OTHER CALCULATIONS
-- Defer fluid needs to medical team  -- Estimated calorie/protein needs based on IBW of 178 pounds

## 2024-10-05 NOTE — DIETITIAN INITIAL EVALUATION ADULT - REASON INDICATOR FOR ASSESSMENT
Consult- Nutrition Support Team  Information obtained from: Review of pt's current medical record, interview with pt in his assigned room on [facility]

## 2024-10-05 NOTE — DIETITIAN INITIAL EVALUATION ADULT - ADD RECOMMEND
1. Recommend discontinue current therapeutic dietary restrictions and provide a Regular diet instead  2. Encourage adequate PO intakes as tolerated. Honor food preferences as appropriate and available.   3. Monitor PO intake, GI tolerance, skin integrity and labs. RD remains available if needed, pt is aware.

## 2024-10-05 NOTE — DIETITIAN INITIAL EVALUATION ADULT - NS FNS DIET ORDER
Diet, Regular:   DASH/TLC {Sodium & Cholesterol Restricted} (DASH)  No Concentrated Potassium  Low Sodium  No Concentrated Phosphorus (10-03-24 @ 19:13) [Active]

## 2024-10-05 NOTE — CHART NOTE - NSCHARTNOTEFT_GEN_A_CORE
Patient by attending at bedside. Offered inpatient bladder outlet procedure, but patient now refusing and would prefer to follow up in outpatient setting to schedule procedure.    Recommendations:  - Continue to trend Cr > downtrending 4.86 from 5.16  - Continue to monitor for post-obstructive diuresis (3L UOP over 24h)   - Home with silva  - Outpatient follow up for bladder outlet procedure planning    D/w Dr. Edward Kapadia [facility]  [address] [place], [place] [id number] [phone] Patient seen by attending at bedside. Offered inpatient bladder outlet procedure, but patient now refusing and would prefer to follow up in outpatient setting to schedule procedure.    Recommendations:  - Continue to trend Cr > downtrending 4.86 from 5.16  - Continue to monitor for post-obstructive diuresis (3L UOP over 24h)   - Home with silva  - Outpatient follow up for bladder outlet procedure planning    D/w Dr. Edward Kapadia [facility]  [address] [place], [place] [id number] [phone]

## 2024-10-05 NOTE — DIETITIAN INITIAL EVALUATION ADULT - WEIGHT CHANGE
Electrodesiccation Text: The wound bed was treated with electrodesiccation after the biopsy was performed. yes

## 2024-10-05 NOTE — DIETITIAN INITIAL EVALUATION ADULT - % CHANGE
8.53
[FreeTextEntry1] : INR low today; will have patient take 5mg daily and an extra half a tablet two days a week on Mondays and Thursdays for two weeks with repeat labs in 2 weeks for INR check. All questions answered. INR goal is 2-3, today it is 1.5. Alprazolam renewed as well, iSTOP checked.

## 2024-10-05 NOTE — DIETITIAN INITIAL EVALUATION ADULT - NS FNS REASON FOR WEIGHT CHANG
Weights:  - Source: Patient   - UBW:  150-155 pounds     Current Admission Weights:  - Dosing weight: 77.1 kg/ 170 pounds  (10/3/24)  - Daily weight:  67.9  kg/ 149.7 pounds  (10-04)    Weight History per previous RD assessment:  - 74.8 kg/ 164 pounds (7/2024)     Weight Change:  - 14.3 pounds/ 8.7% significant weight loss X 3 months  - Weight loss likely secondary to fluid shifts with edema   - Will continue to monitor weight status as able

## 2024-10-05 NOTE — DIETITIAN INITIAL EVALUATION ADULT - OTHER INFO
Nutrition-Related Concerns:    Acute kidney injury superimposed on CKD   -- Hypernatremic   -- Normal Potassium and Phosphorus noted since admission  -- BUN/Creatinine elevated but trending down since admission

## 2024-10-06 LAB
ANION GAP SERPL CALC-SCNC: 14 MMOL/L — SIGNIFICANT CHANGE UP (ref 5–17)
BUN SERPL-MCNC: 64 MG/DL — HIGH (ref 7–23)
CALCIUM SERPL-MCNC: 9 MG/DL — SIGNIFICANT CHANGE UP (ref 8.4–10.5)
CHLORIDE SERPL-SCNC: 105 MMOL/L — SIGNIFICANT CHANGE UP (ref 96–108)
CO2 SERPL-SCNC: 22 MMOL/L — SIGNIFICANT CHANGE UP (ref 22–31)
CREAT SERPL-MCNC: 4.39 MG/DL — HIGH (ref 0.5–1.3)
EGFR: 14 ML/MIN/1.73M2 — LOW
GLUCOSE SERPL-MCNC: 94 MG/DL — SIGNIFICANT CHANGE UP (ref 70–99)
MAGNESIUM SERPL-MCNC: 1.5 MG/DL — LOW (ref 1.6–2.6)
POTASSIUM SERPL-MCNC: 4.4 MMOL/L — SIGNIFICANT CHANGE UP (ref 3.5–5.3)
POTASSIUM SERPL-SCNC: 4.4 MMOL/L — SIGNIFICANT CHANGE UP (ref 3.5–5.3)
SODIUM SERPL-SCNC: 141 MMOL/L — SIGNIFICANT CHANGE UP (ref 135–145)

## 2024-10-06 RX ORDER — PSYLLIUM HUSK 0.4 G
400 CAPSULE ORAL ONCE
Refills: 0 | Status: DISCONTINUED | OUTPATIENT
Start: 2024-10-06 | End: 2024-10-07

## 2024-10-06 RX ADMIN — Medication 650 MILLIGRAM(S): at 17:09

## 2024-10-06 RX ADMIN — Medication 60 MILLILITER(S): at 05:16

## 2024-10-06 RX ADMIN — Medication 650 MILLIGRAM(S): at 05:16

## 2024-10-07 ENCOUNTER — TRANSCRIPTION ENCOUNTER (OUTPATIENT)
Age: 64
End: 2024-10-07

## 2024-10-07 VITALS
SYSTOLIC BLOOD PRESSURE: 111 MMHG | OXYGEN SATURATION: 100 % | DIASTOLIC BLOOD PRESSURE: 65 MMHG | HEART RATE: 83 BPM | RESPIRATION RATE: 18 BRPM | TEMPERATURE: 98 F

## 2024-10-07 PROCEDURE — 85610 PROTHROMBIN TIME: CPT

## 2024-10-07 PROCEDURE — 82962 GLUCOSE BLOOD TEST: CPT

## 2024-10-07 PROCEDURE — 99291 CRITICAL CARE FIRST HOUR: CPT

## 2024-10-07 PROCEDURE — 85018 HEMOGLOBIN: CPT

## 2024-10-07 PROCEDURE — 84300 ASSAY OF URINE SODIUM: CPT

## 2024-10-07 PROCEDURE — 85027 COMPLETE CBC AUTOMATED: CPT

## 2024-10-07 PROCEDURE — 82728 ASSAY OF FERRITIN: CPT

## 2024-10-07 PROCEDURE — 76770 US EXAM ABDO BACK WALL COMP: CPT

## 2024-10-07 PROCEDURE — G0103: CPT

## 2024-10-07 PROCEDURE — 84540 ASSAY OF URINE/UREA-N: CPT

## 2024-10-07 PROCEDURE — 85730 THROMBOPLASTIN TIME PARTIAL: CPT

## 2024-10-07 PROCEDURE — 82435 ASSAY OF BLOOD CHLORIDE: CPT

## 2024-10-07 PROCEDURE — 82947 ASSAY GLUCOSE BLOOD QUANT: CPT

## 2024-10-07 PROCEDURE — 83930 ASSAY OF BLOOD OSMOLALITY: CPT

## 2024-10-07 PROCEDURE — 83735 ASSAY OF MAGNESIUM: CPT

## 2024-10-07 PROCEDURE — 82330 ASSAY OF CALCIUM: CPT

## 2024-10-07 PROCEDURE — 83605 ASSAY OF LACTIC ACID: CPT

## 2024-10-07 PROCEDURE — 83935 ASSAY OF URINE OSMOLALITY: CPT

## 2024-10-07 PROCEDURE — 82570 ASSAY OF URINE CREATININE: CPT

## 2024-10-07 PROCEDURE — 83880 ASSAY OF NATRIURETIC PEPTIDE: CPT

## 2024-10-07 PROCEDURE — 96375 TX/PRO/DX INJ NEW DRUG ADDON: CPT

## 2024-10-07 PROCEDURE — 85025 COMPLETE CBC W/AUTO DIFF WBC: CPT

## 2024-10-07 PROCEDURE — 93306 TTE W/DOPPLER COMPLETE: CPT

## 2024-10-07 PROCEDURE — 84133 ASSAY OF URINE POTASSIUM: CPT

## 2024-10-07 PROCEDURE — 84156 ASSAY OF PROTEIN URINE: CPT

## 2024-10-07 PROCEDURE — 80053 COMPREHEN METABOLIC PANEL: CPT

## 2024-10-07 PROCEDURE — 83540 ASSAY OF IRON: CPT

## 2024-10-07 PROCEDURE — 80048 BASIC METABOLIC PNL TOTAL CA: CPT

## 2024-10-07 PROCEDURE — 86900 BLOOD TYPING SEROLOGIC ABO: CPT

## 2024-10-07 PROCEDURE — 96374 THER/PROPH/DIAG INJ IV PUSH: CPT

## 2024-10-07 PROCEDURE — 93308 TTE F-UP OR LMTD: CPT

## 2024-10-07 PROCEDURE — 93970 EXTREMITY STUDY: CPT

## 2024-10-07 PROCEDURE — 93005 ELECTROCARDIOGRAM TRACING: CPT

## 2024-10-07 PROCEDURE — 83550 IRON BINDING TEST: CPT

## 2024-10-07 PROCEDURE — 81001 URINALYSIS AUTO W/SCOPE: CPT

## 2024-10-07 PROCEDURE — 82550 ASSAY OF CK (CPK): CPT

## 2024-10-07 PROCEDURE — 71045 X-RAY EXAM CHEST 1 VIEW: CPT

## 2024-10-07 PROCEDURE — 84484 ASSAY OF TROPONIN QUANT: CPT

## 2024-10-07 PROCEDURE — 84295 ASSAY OF SERUM SODIUM: CPT

## 2024-10-07 PROCEDURE — 36415 COLL VENOUS BLD VENIPUNCTURE: CPT

## 2024-10-07 PROCEDURE — 86901 BLOOD TYPING SEROLOGIC RH(D): CPT

## 2024-10-07 PROCEDURE — 85014 HEMATOCRIT: CPT

## 2024-10-07 PROCEDURE — 86850 RBC ANTIBODY SCREEN: CPT

## 2024-10-07 PROCEDURE — 84132 ASSAY OF SERUM POTASSIUM: CPT

## 2024-10-07 PROCEDURE — 82553 CREATINE MB FRACTION: CPT

## 2024-10-07 PROCEDURE — 82803 BLOOD GASES ANY COMBINATION: CPT

## 2024-10-07 PROCEDURE — 96376 TX/PRO/DX INJ SAME DRUG ADON: CPT

## 2024-10-07 RX ORDER — SODIUM BICARBONATE 650 MG
1 TABLET ORAL
Qty: 60 | Refills: 0
Start: 2024-10-07 | End: 2024-11-05

## 2024-10-07 RX ORDER — FINASTERIDE 5 MG/1
1 TABLET, FILM COATED ORAL
Qty: 30 | Refills: 0
Start: 2024-10-07 | End: 2024-11-05

## 2024-10-07 RX ORDER — TAMSULOSIN HCL 0.4 MG
1 CAPSULE ORAL
Qty: 30 | Refills: 0
Start: 2024-10-07 | End: 2024-11-05

## 2024-10-07 RX ORDER — MAGNESIUM SULFATE 500 MG/ML
2 VIAL (ML) INJECTION ONCE
Refills: 0 | Status: COMPLETED | OUTPATIENT
Start: 2024-10-07 | End: 2024-10-07

## 2024-10-07 RX ORDER — SODIUM BICARBONATE 650 MG
1 TABLET ORAL
Qty: 0 | Refills: 0 | DISCHARGE
Start: 2024-10-07

## 2024-10-07 RX ADMIN — Medication 25 GRAM(S): at 11:35

## 2024-10-07 RX ADMIN — Medication 650 MILLIGRAM(S): at 05:24

## 2024-10-07 NOTE — PROVIDER CONTACT NOTE (OTHER) - RECOMMENDATIONS
Will continue to encourage the patient.
provider made aware
Notify YADIEL Smallwood.
notify Dr. West

## 2024-10-07 NOTE — DISCHARGE NOTE PROVIDER - HOSPITAL COURSE
HPI:  NIGHT HOSPITALIST:    Patient UNKNOWN to me previously, assigned to me at this point via the ER and by Dr. Barone to admit this 63 y/o M--apparently initially admitted to [facility] and discharged July 16 2024 for suprapubic pain, apparently with 2 months of preceding urinary incontinence at that time, noted to have acute kidney injury and obstructive uropathy, with Pratt placement at the time, with patient seen by Urology at the time, with recommendation for outpatient Urology followup, with discharge and evaluation by Dr. Florian Bianchi of Urology (seen 7/22/24) with his Pratt discontinued at the office, with patient then with [facility] at the time, with inpatient referral, with patient then self referring to [facility] again for urinary retention, with readmission with acute kidney injury, with management of hyperkalemia and HCO3 gtt provided during that hospitalisation, with patient discharged [facility] July 24 2024 with Pratt in place.   Apparently patient self refers to an urgent care center (patient sees no regular physician and has not followed up with Urology during this interval) with patient's Pratt has been discontinued since, with patient reports to me that he stopped the Proscar and finasteride, verbalizing, "I do not believe in medications".    Patient again feels less bladder distention since Pratt placed, with patient receiving treatment for hyperkalemia in the ER.   Denies fever, chills, rigors.   NO chest pain/pressure.  NO palpitations.   NO abdominal pain, no red blood per rectum or melena.   NO back pain, no tearing back pain.   NO anorexia.   NO N/V. (03 Oct 2024 19:07)    Hospital Course:      Important Medication Changes and Reason:    Active or Pending Issues Requiring Follow-up:    Advanced Directives:   [ ] Full code  [ ] DNR  [ ] Hospice    Discharge Diagnoses:         HPI:  65 y/o M--apparently initially admitted to [facility] and discharged July 16 2024 for suprapubic pain, apparently with 2 months of preceding urinary incontinence at that time, noted to have acute kidney injury and obstructive uropathy, with Silva placement at the time, with patient seen by Urology at the time, with recommendation for outpatient Urology followup, with discharge and evaluation by Dr. Florian Bianchi of Urology (seen 7/22/24) with his Silva discontinued at the office, with patient then with [facility] at the time, with inpatient referral, with patient then self referring to [facility] again for urinary retention, with readmission with acute kidney injury, with management of hyperkalemia and bicarb gtt provided during that hospitalization, with patient discharged [facility] July 24 2024 with Silva in place.   Patient self refers to an urgent care center (patient sees no regular physician and has not followed up with Urology during this interval) with patient's Silva has been discontinued since, with patient reports to me that he stopped the Proscar and finasteride, verbalizing, "I do not believe in medications".     Hospital Course:  Pt was admitted with urinary retention, s/p indwelling silva placement in ER. Pt was also admitted with YG on CKD and Bilateral hydronephrosis on POCUS iso obstructive uropathy, which has improved with silva insertion and IVF. Pt was also treated for hypomagnesemia and Hyperkalemia, which normalized. Pt was started on oral bicarb for metabolic acidosis 2/2 renal injury.   __ pt states that Dr Davila from  saw him yesterday and told him to follow up in  clinic with him for bladder outlet procedure (possibly TURP), tentatively on 10/21. Today I spoke with  service, THAO Arambula; as per discussion with  team, pt is medically cleared for discharge with outpt follow up with Dr Leon in 1-2 weeks.        Important Medication Changes and Reason:    Active or Pending Issues Requiring Follow-up:    Advanced Directives:   [ x] Full code  [ ] DNR  [ ] Hospice    Discharge Diagnoses: YG on CKD and Bilateral hydronephrosis on POCUS iso obstructive uropathy; Urinary retention, s/p indwelling silva placement   __ pt is being discharged with indwelling silva with outpt f/u with Dr Leon in 1-2 weeks for bladder outlet procedure (possibly TURP), tentatively on 10/21.         HPI:  65 y/o M--apparently initially admitted to [facility] and discharged July 16 2024 for suprapubic pain, apparently with 2 months of preceding urinary incontinence at that time, noted to have acute kidney injury and obstructive uropathy, with Silva placement at the time, with patient seen by Urology at the time, with recommendation for outpatient Urology followup, with discharge and evaluation by Dr. Florian Bianchi of Urology (seen 7/22/24) with his Silva discontinued at the office, with patient then with [facility] at the time, with inpatient referral, with patient then self referring to [facility] again for urinary retention, with readmission with acute kidney injury, with management of hyperkalemia and bicarb gtt provided during that hospitalization, with patient discharged [facility] July 24 2024 with Silva in place.   Patient self refers to an urgent care center (patient sees no regular physician and has not followed up with Urology during this interval) with patient's Silva has been discontinued since, with patient reports to me that he stopped the Proscar and finasteride, verbalizing, "I do not believe in medications".     Hospital Course:  Pt was admitted with urinary retention, s/p indwelling silva placement in ER. Pt was also admitted with YG on CKD and Bilateral hydronephrosis on POCUS iso obstructive uropathy, which has improved with silva insertion and IVF. Pt was also treated for hypomagnesemia and Hyperkalemia, which normalized. Pt was started on oral bicarb for metabolic acidosis 2/2 renal injury.   __ pt states that Dr Davila from  saw him yesterday and told him to follow up in  clinic with him for bladder outlet procedure (possibly TURP), tentatively on 10/21. Today I spoke with  service, THAO Arambula; as per discussion with  team, pt is medically cleared for discharge with outpt follow up with Dr Leon in 1-2 weeks.        Important Medication Changes and Reason:    Active or Pending Issues Requiring Follow-up:    Advanced Directives:   [ x] Full code  [ ] DNR  [ ] Hospice    Discharge Diagnoses: YG on CKD and Bilateral hydronephrosis on POCUS iso obstructive uropathy; Urinary retention, s/p indwelling silva placement   __ pt is being discharged with indwelling silva with outpt f/u with Dr Leon in 1-2 weeks for bladder outlet procedure (possibly TURP), tentatively on 10/21.  -- as per discussion with Dr Barone, pt is medically cleared for discharge          HPI:  65 y/o M--apparently initially admitted to [facility] and discharged July 16 2024 for suprapubic pain, apparently with 2 months of preceding urinary incontinence at that time, noted to have acute kidney injury and obstructive uropathy, with Silva placement at the time, with patient seen by Urology at the time, with recommendation for outpatient Urology followup, with discharge and evaluation by Dr. Florian Bianchi of Urology (seen 7/22/24) with his Silva discontinued at the office, with patient then with [facility] at the time, with inpatient referral, with patient then self referring to [facility] again for urinary retention, with readmission with acute kidney injury, with management of hyperkalemia and bicarb gtt provided during that hospitalization, with patient discharged [facility] July 24 2024 with Silva in place.   Patient self refers to an urgent care center (patient sees no regular physician and has not followed up with Urology during this interval) with patient's Silva has been discontinued since, with patient reports to me that he stopped the Proscar and finasteride, verbalizing, "I do not believe in medications".     Hospital Course:  Pt was admitted with urinary retention, s/p indwelling silva placement in ER. Pt was also admitted with YG on CKD and Bilateral hydronephrosis on POCUS iso obstructive uropathy, which has improved with silva insertion and IVF. Pt was also treated for hypomagnesemia and Hyperkalemia, which normalized. Pt was started on oral bicarb for metabolic acidosis 2/2 renal injury.   __ pt states that Dr Davila from  saw him yesterday and told him to follow up in  clinic with him for bladder outlet procedure (possibly TURP), tentatively on 10/21. Today I spoke with  service, THAO Arambula; as per discussion with  team, pt is medically cleared for discharge with outpt follow up with Dr Leon in 1-2 weeks.        Important Medication Changes and Reason:    Active or Pending Issues Requiring Follow-up:    Advanced Directives:   [ x] Full code  [ ] DNR  [ ] Hospice    Discharge Diagnoses: YG on CKD and Bilateral hydronephrosis on POCUS iso obstructive uropathy; Urinary retention, s/p indwelling silva placement   __ pt is being discharged with indwelling silva with outpt f/u with Dr Leon in 1-2 weeks for bladder outlet procedure (possibly TURP), tentatively on 10/21.  -- as per discussion with Dr Barone, pt is medically cleared for discharge   -- pt refused home care  for indwelling silva, discussed with CM

## 2024-10-07 NOTE — PROVIDER CONTACT NOTE (OTHER) - REASON
patient non compliant with telemetry monitoring
refused am labs, IV insertion, and tele throughout the shift
Patient non compliant with IV insertion and tele monitoring
Patient refusing heparin and morning bloodwork

## 2024-10-07 NOTE — PROVIDER CONTACT NOTE (OTHER) - SITUATION
Patient refusing heparin and morning bloodwork
Patient removed the IV line . Was on sodium bicarbonate infusion. Refusing New IV insertion. Educated the patient on the importance of  drip and iv need. Verbalized understanding . Still refusing.
patient admitted to [facility] today @1900, when placing telemetry monitor on patient, patient did not want it on.
refused am labs, IV insertion, and tele throughout the shift despite multiple attempts of education

## 2024-10-07 NOTE — PROVIDER CONTACT NOTE (OTHER) - ASSESSMENT
Patient A & O x 4. VSS.
pt a&o4. vss on ra. denies chest pain, palpitations, sob at this time.
Patient A&Ox4, VSS. No c/o chest pain, SOB, palpitations or dizziness. Patient denied morning laboratory workup and morning heparin, only consented to taking sodium bicarbonate. Patient educated on importance of heparin in thinning blood and being a "clot buster" for DVT and other clots. Patient also educated on importance of morning laboratory work for diagnosis and treatment and patient still adamantly refused.
patient A&Ox4, oriented to unit, educated on use and importance of telemetry monitoring. patient refused/non compliant.

## 2024-10-07 NOTE — DISCHARGE NOTE PROVIDER - NSFOLLOWUPCLINICS_GEN_ALL_ED_FT
[facility] - Primary Care  Primary Care  [address] [place] [address]  Phone: [phone]  Fax:   Follow Up Time: 1 week

## 2024-10-07 NOTE — PROGRESS NOTE ADULT - REASON FOR ADMISSION
Several weeks of B/L LE oedema, and bladder distention

## 2024-10-07 NOTE — DISCHARGE NOTE PROVIDER - NSDCCPCAREPLAN_GEN_ALL_CORE_FT
PRINCIPAL DISCHARGE DIAGNOSIS  Diagnosis: Obstructive uropathy  Assessment and Plan of Treatment: __ pt is being discharged with indwelling silva with outpt f/u with Dr Leon in 1-2 weeks for bladder outlet procedure (possibly TURP), tentatively on 10/21.      SECONDARY DISCHARGE DIAGNOSES  Diagnosis: Acute kidney injury superimposed on CKD  Assessment and Plan of Treatment: YG on CKD and Bilateral hydronephrosis on POCUS iso obstructive uropathy; Urinary retention, s/p indwelling silva placement   -- oupt f/u with PCP in 1 [facility]    Diagnosis: BPH with urinary obstruction  Assessment and Plan of Treatment: __ pt is being discharged with indwelling silva with outpt f/u with Dr Leon in 1-2 weeks for bladder outlet procedure (possibly TURP), tentatively on 10/21.  -- oupt f/u with PCP in 1 [facility]    Diagnosis: Hyperkalemia  Assessment and Plan of Treatment: s/p treatment, resolved    Diagnosis: Hydronephrosis, bilateral  Assessment and Plan of Treatment: __ pt is being discharged with indwelling silva with outpt f/u with Dr Leon in 1-2 weeks for bladder outlet procedure (possibly TURP), tentatively on 10/21.

## 2024-10-07 NOTE — PROVIDER CONTACT NOTE (OTHER) - BACKGROUND
Dx: urinary retention - silva placed, YG on CKD, hyperkalemia + VA duplex  PMH: LE edema, BPH
Patient admitted for obstructive and reflux uropathy with PMH of lower extremity edema, renal failure, hyperkalemia and current DVT to the gastrocnemius vein of the left.
Admitted for YG on CKD.
patient admitted for obstructive and reflux uropathy, PMHx YG on CKD, renal failure, hyperkalemia.

## 2024-10-07 NOTE — DISCHARGE NOTE PROVIDER - NSDCMRMEDTOKEN_GEN_ALL_CORE_FT
finasteride 5 mg oral tablet: 1 tab(s) orally once a day  tamsulosin 0.4 mg oral capsule: 1 cap(s) orally once a day (at bedtime)   finasteride 5 mg oral tablet: 1 tab(s) orally once a day  sodium bicarbonate 650 mg oral tablet: 1 tab(s) orally 2 times a day  tamsulosin 0.4 mg oral capsule: 1 cap(s) orally once a day (at bedtime)   sodium bicarbonate 650 mg oral tablet: 1 tab(s) orally 2 times a day

## 2024-10-07 NOTE — PROGRESS NOTE ADULT - SUBJECTIVE AND OBJECTIVE BOX
Dr. Agustin  Office [phone] (9 am to 5 pm)  Service: [phone] (5pm to 9am)  Alvina OSUNA      RENAL PROGRESS NOTE: DATE OF SERVICE 10-05-24 @ 15:29    Patient is a 64y old  Male who presents with a chief complaint of Chart Reviewed, Events Noted  "Patient is a 64y old  Male who presents with a chief complaint of Several weeks of B/L LE oedema, and bladder distention"     (05 Oct 2024 10:20)      Patient seen and examined at bedside. No chest pain/sob    VITALS:  T(F): 97.7 (10-05-24 @ 11:34), Max: 98.5 (10-05-24 @ 04:00)  HR: 70 (10-05-24 @ 11:34)  BP: 134/83 (10-05-24 @ 11:34)  RR: 18 (10-05-24 @ 11:34)  SpO2: 98% (10-05-24 @ 11:34)  Wt(kg): --    10-04 @ 07:01  -  10-05 @ 07:00  --------------------------------------------------------  IN: 600 mL / OUT: 3000 mL / NET: -2400 mL    10-05 @ 07:01  -  10-05 @ 15:29  --------------------------------------------------------  IN: 480 mL / OUT: 800 mL / NET: -320 mL          PHYSICAL EXAM:  Constitutional: NAD  Neck: No JVD  Respiratory: CTAB, no wheezes, rales or rhonchi  Cardiovascular: S1, S2, RRR  Gastrointestinal: BS+, soft, NT/ND  Extremities: No peripheral edema    Hospital Medications:   MEDICATIONS  (STANDING):  influenza  Vaccine (HIGH DOSE) 0.5 milliLiter(s) IntraMuscular once  sodium bicarbonate 650 milliGRAM(s) Oral two times a day  sodium chloride 0.9%. 1000 milliLiter(s) (50 mL/Hr) IV Continuous <Continuous>      LABS:  10-05    146[H]  |  110[H]  |  70[H]  ----------------------------<  90  4.8   |  21[L]  |  4.86[H]    Ca    9.2      05 Oct 2024 07:56      Creatinine Trend: 4.86 <--, 5.16 <--, 5.14 <--, 5.64 <--, 5.72 <--                                8.3    5.32  )-----------( 226      ( 05 Oct 2024 07:56 )             26.9     Urine Studies:  Urinalysis - [10-05-24 @ 07:56]      Color  / Appearance  / SG  / pH       Gluc 90 / Ketone   / Bili  / Urobili        Blood  / Protein  / Leuk Est  / Nitrite       RBC  / WBC  / Hyaline  / Gran  / Sq Epi  / Non Sq Epi  / Bacteria     Urine Creatinine 39      [10-03-24 @ 10:57]  Urine Protein 19      [10-03-24 @ 10:57]  Urine Sodium 67      [10-03-24 @ 10:57]  Urine Urea Nitrogen 340      [10-03-24 @ 10:57]  Urine Potassium 27      [10-03-24 @ 10:57]  Urine Osmolality 315      [10-03-24 @ 10:57]    Iron 53, TIBC 363, %sat 15      [10-04-24 @ 06:11]  Ferritin 319      [10-04-24 @ 06:11]        RADIOLOGY & ADDITIONAL STUDIES:  
  Dr. Agustin  Office [phone] (9 am to 5 pm)  Service: [phone] (5pm to 9am)  Alvina OSUNA      RENAL PROGRESS NOTE: DATE OF SERVICE 10-06-24 @ 14:59    Patient is a 64y old  Male who presents with a chief complaint of Several weeks of B/L LE oedema, and bladder distention (05 Oct 2024 16:32)      Patient seen and examined at bedside. No chest pain/sob    VITALS:  T(F): 98.2 (10-06-24 @ 07:00), Max: 98.9 (10-05-24 @ 21:02)  HR: 60 (10-06-24 @ 07:00)  BP: 134/87 (10-06-24 @ 07:00)  RR: 18 (10-06-24 @ 07:00)  SpO2: 99% (10-06-24 @ 07:00)  Wt(kg): --    10-05 @ 07:01  -  10-06 @ 07:00  --------------------------------------------------------  IN: 1010 mL / OUT: 1650 mL / NET: -640 mL        Weight (kg): 65.5 (10-06 @ 04:28)    PHYSICAL EXAM:  Constitutional: NAD  Neck: No JVD  Respiratory: CTAB, no wheezes, rales or rhonchi  Cardiovascular: S1, S2, RRR  Gastrointestinal: BS+, soft, NT/ND  Extremities: No peripheral edema    Hospital Medications:   MEDICATIONS  (STANDING):  influenza  Vaccine (HIGH DOSE) 0.5 milliLiter(s) IntraMuscular once  sodium bicarbonate 650 milliGRAM(s) Oral two times a day  sodium chloride 0.45%. 1000 milliLiter(s) (60 mL/Hr) IV Continuous <Continuous>      LABS:  10-06    141  |  105  |  64[H]  ----------------------------<  94  4.4   |  22  |  4.39[H]    Ca    9.0      06 Oct 2024 10:37  Mg     1.5     10-06      Creatinine Trend: 4.39 <--, 4.86 <--, 5.16 <--, 5.14 <--, 5.64 <--, 5.72 <--                                8.3    5.32  )-----------( 226      ( 05 Oct 2024 07:56 )             26.9     Urine Studies:  Urinalysis - [10-06-24 @ 10:37]      Color  / Appearance  / SG  / pH       Gluc 94 / Ketone   / Bili  / Urobili        Blood  / Protein  / Leuk Est  / Nitrite       RBC  / WBC  / Hyaline  / Gran  / Sq Epi  / Non Sq Epi  / Bacteria     Urine Creatinine 39      [10-03-24 @ 10:57]  Urine Protein 19      [10-03-24 @ 10:57]  Urine Sodium 67      [10-03-24 @ 10:57]  Urine Urea Nitrogen 340      [10-03-24 @ 10:57]  Urine Potassium 27      [10-03-24 @ 10:57]  Urine Osmolality 315      [10-03-24 @ 10:57]    Iron 53, TIBC 363, %sat 15      [10-04-24 @ 06:11]  Ferritin 319      [10-04-24 @ 06:11]        RADIOLOGY & ADDITIONAL STUDIES:  
Patient is a 64y old  Male who presents with a chief complaint of Several weeks of B/L LE oedema, and bladder distention (05 Oct 2024 15:29)      INTERVAL HPI/OVERNIGHT EVENTS: seen and examined   T(C): 37.2 (10-05-24 @ 21:02), Max: 37.2 (10-05-24 @ 21:02)  HR: 92 (10-05-24 @ 21:02) (62 - 92)  BP: 136/76 (10-05-24 @ 21:02) (119/80 - 146/86)  RR: 18 (10-05-24 @ 21:02) (17 - 18)  SpO2: 97% (10-05-24 @ 21:02) (97% - 99%)  Wt(kg): --  I&O's Summary    04 Oct 2024 07:01  -  05 Oct 2024 07:00  --------------------------------------------------------  IN: 600 mL / OUT: 3000 mL / NET: -2400 mL    05 Oct 2024 07:01  -  05 Oct 2024 23:33  --------------------------------------------------------  IN: 1010 mL / OUT: 1650 mL / NET: -640 mL        PAST MEDICAL & SURGICAL HISTORY:  Acute kidney injury superimposed on CKD      Obstructive uropathy      Lower extremity edema      COVID-19 vaccination refused      No significant past surgical history          SOCIAL HISTORY  Alcohol:  Tobacco:  Illicit substance use:    FAMILY HISTORY:    REVIEW OF SYSTEMS:  CONSTITUTIONAL: No fever, weight loss, or fatigue  EYES: No eye pain, visual disturbances, or discharge  ENMT:  No difficulty hearing, tinnitus, vertigo; No sinus or throat pain  NECK: No pain or stiffness  RESPIRATORY: No cough, wheezing, chills or hemoptysis; No shortness of breath  CARDIOVASCULAR: No chest pain, palpitations, dizziness, or leg swelling  GASTROINTESTINAL: No abdominal or epigastric pain. No nausea, vomiting, or hematemesis; No diarrhea or constipation. No melena or hematochezia.  GENITOURINARY: No dysuria, frequency, hematuria, or incontinence  NEUROLOGICAL: No headaches, memory loss, loss of strength, numbness, or tremors  SKIN: No itching, burning, rashes, or lesions   LYMPH NODES: No enlarged glands  ENDOCRINE: No heat or cold intolerance; No hair loss  MUSCULOSKELETAL: No joint pain or swelling; No muscle, back, or extremity pain  PSYCHIATRIC: No depression, anxiety, mood swings, or difficulty sleeping  HEME/LYMPH: No easy bruising, or bleeding gums  ALLERY AND IMMUNOLOGIC: No hives or eczema    RADIOLOGY & ADDITIONAL TESTS:    Imaging Personally Reviewed:  [ ] YES  [ ] NO    Consultant(s) Notes Reviewed:  [ ] YES  [ ] NO    PHYSICAL EXAM:  GENERAL: NAD, well-groomed, well-developed  HEAD:  Atraumatic, Normocephalic  EYES: EOMI, PERRLA, conjunctiva and sclera clear  ENMT: No tonsillar erythema, exudates, or enlargement; Moist mucous membranes, Good dentition, No lesions  NECK: Supple, No JVD, Normal thyroid  NERVOUS SYSTEM:  Alert & Oriented X3, Good concentration; Motor Strength 5/5 B/L upper and lower extremities; DTRs 2+ intact and symmetric  CHEST/LUNG: Clear to percussion bilaterally; No rales, rhonchi, wheezing, or rubs  HEART: Regular rate and rhythm; No murmurs, rubs, or gallops  ABDOMEN: Soft, Nontender, Nondistended; Bowel sounds present  EXTREMITIES:  2+ Peripheral Pulses, No clubbing, cyanosis, or edema  LYMPH: No lymphadenopathy noted  SKIN: No rashes or lesions    LABS:                        8.3    5.32  )-----------( 226      ( 05 Oct 2024 07:56 )             26.9     10-05    146[H]  |  110[H]  |  70[H]  ----------------------------<  90  4.8   |  21[L]  |  4.86[H]    Ca    9.2      05 Oct 2024 07:56        Urinalysis Basic - ( 05 Oct 2024 07:56 )    Color: x / Appearance: x / SG: x / pH: x  Gluc: 90 mg/dL / Ketone: x  / Bili: x / Urobili: x   Blood: x / Protein: x / Nitrite: x   Leuk Esterase: x / RBC: x / WBC x   Sq Epi: x / Non Sq Epi: x / Bacteria: x      CAPILLARY BLOOD GLUCOSE            Urinalysis Basic - ( 05 Oct 2024 07:56 )    Color: x / Appearance: x / SG: x / pH: x  Gluc: 90 mg/dL / Ketone: x  / Bili: x / Urobili: x   Blood: x / Protein: x / Nitrite: x   Leuk Esterase: x / RBC: x / WBC x   Sq Epi: x / Non Sq Epi: x / Bacteria: x        MEDICATIONS  (STANDING):  influenza  Vaccine (HIGH DOSE) 0.5 milliLiter(s) IntraMuscular once  sodium bicarbonate 650 milliGRAM(s) Oral two times a day  sodium chloride 0.45%. 1000 milliLiter(s) (60 mL/Hr) IV Continuous <Continuous>    MEDICATIONS  (PRN):  acetaminophen     Tablet .. 650 milliGRAM(s) Oral every 6 hours PRN Temp greater or equal to 38C (100.4F), Mild Pain (1 - 3)      Care Discussed with Consultants/Other Providers [ ] YES  [ ] NO
Patient is a 64y old  Male who presents with a chief complaint of Several weeks of B/L LE oedema, and bladder distention (04 Oct 2024 12:15)    Date of servie : 10-04-24 @ 13:08  INTERVAL HPI/OVERNIGHT EVENTS:  T(C): 36.8 (10-04-24 @ 12:02), Max: 37 (10-04-24 @ 08:24)  HR: 86 (10-04-24 @ 12:02) (72 - 86)  BP: 159/84 (10-04-24 @ 12:02) (151/93 - 161/92)  RR: 18 (10-04-24 @ 12:02) (17 - 18)  SpO2: 100% (10-04-24 @ 12:02) (97% - 100%)  Wt(kg): --  I&O's Summary    03 Oct 2024 07:01  -  04 Oct 2024 07:00  --------------------------------------------------------  IN: 0 mL / OUT: 4600 mL / NET: -4600 mL    04 Oct 2024 07:01  -  04 Oct 2024 13:08  --------------------------------------------------------  IN: 600 mL / OUT: 0 mL / NET: 600 mL        LABS:                        8.2    5.55  )-----------( 222      ( 04 Oct 2024 10:20 )             26.8     10-04    142  |  107  |  76[H]  ----------------------------<  129[H]  4.6   |  18[L]  |  5.16[H]    Ca    9.2      04 Oct 2024 10:20  Mg     2.0     10-03    TPro  7.0  /  Alb  3.8  /  TBili  0.1[L]  /  DBili  x   /  AST  <5[L]  /  ALT  11  /  AlkPhos  61  10-03    PT/INR - ( 03 Oct 2024 10:56 )   PT: 10.5 sec;   INR: 0.91 ratio         PTT - ( 03 Oct 2024 10:56 )  PTT:31.1 sec  Urinalysis Basic - ( 04 Oct 2024 10:20 )    Color: x / Appearance: x / SG: x / pH: x  Gluc: 129 mg/dL / Ketone: x  / Bili: x / Urobili: x   Blood: x / Protein: x / Nitrite: x   Leuk Esterase: x / RBC: x / WBC x   Sq Epi: x / Non Sq Epi: x / Bacteria: x      CAPILLARY BLOOD GLUCOSE      POCT Blood Glucose.: 124 mg/dL (03 Oct 2024 14:40)  POCT Blood Glucose.: 153 mg/dL (03 Oct 2024 13:52)        Urinalysis Basic - ( 04 Oct 2024 10:20 )    Color: x / Appearance: x / SG: x / pH: x  Gluc: 129 mg/dL / Ketone: x  / Bili: x / Urobili: x   Blood: x / Protein: x / Nitrite: x   Leuk Esterase: x / RBC: x / WBC x   Sq Epi: x / Non Sq Epi: x / Bacteria: x        MEDICATIONS  (STANDING):  influenza  Vaccine (HIGH DOSE) 0.5 milliLiter(s) IntraMuscular once  sodium bicarbonate  Infusion 0.146 mEq/kG/Hr (75 mL/Hr) IV Continuous <Continuous>    MEDICATIONS  (PRN):  acetaminophen     Tablet .. 650 milliGRAM(s) Oral every 6 hours PRN Temp greater or equal to 38C (100.4F), Mild Pain (1 - 3)          PHYSICAL EXAM:  GENERAL: NAD, well-groomed, well-developed  HEAD:  Atraumatic, Normocephalic  CHEST/LUNG: Clear to percussion bilaterally; No rales, rhonchi, wheezing, or rubs  HEART: Regular rate and rhythm; No murmurs, rubs, or gallops  ABDOMEN: Soft, Nontender, Nondistended; Bowel sounds present  EXTREMITIES:  2+ Peripheral Pulses, No clubbing, cyanosis, or edema  LYMPH: No lymphadenopathy noted  SKIN: No rashes or lesions    Care Discussed with Consultants/Other Providers [ ] YES  [ ] NO
Patient is a 64y old  Male who presents with a chief complaint of Several weeks of B/L LE oedema, and bladder distention (06 Oct 2024 14:59)      INTERVAL HPI/OVERNIGHT EVENTS: seen and examined   T(C): 36.7 (10-06-24 @ 20:43), Max: 37.2 (10-05-24 @ 21:02)  HR: 72 (10-06-24 @ 20:43) (60 - 92)  BP: 132/70 (10-06-24 @ 20:43) (123/70 - 154/79)  RR: 18 (10-06-24 @ 20:43) (18 - 18)  SpO2: 98% (10-06-24 @ 20:43) (97% - 99%)  Wt(kg): --  I&O's Summary    05 Oct 2024 07:01  -  06 Oct 2024 07:00  --------------------------------------------------------  IN: 1010 mL / OUT: 1650 mL / NET: -640 mL        PAST MEDICAL & SURGICAL HISTORY:  Acute kidney injury superimposed on CKD      Obstructive uropathy      Lower extremity edema      COVID-19 vaccination refused      No significant past surgical history          SOCIAL HISTORY  Alcohol:  Tobacco:  Illicit substance use:    FAMILY HISTORY:    REVIEW OF SYSTEMS:  CONSTITUTIONAL: No fever, weight loss, or fatigue  EYES: No eye pain, visual disturbances, or discharge  ENMT:  No difficulty hearing, tinnitus, vertigo; No sinus or throat pain  NECK: No pain or stiffness  RESPIRATORY: No cough, wheezing, chills or hemoptysis; No shortness of breath  CARDIOVASCULAR: No chest pain, palpitations, dizziness, or leg swelling  GASTROINTESTINAL: No abdominal or epigastric pain. No nausea, vomiting, or hematemesis; No diarrhea or constipation. No melena or hematochezia.  GENITOURINARY: No dysuria, frequency, hematuria, or incontinence  NEUROLOGICAL: No headaches, memory loss, loss of strength, numbness, or tremors  SKIN: No itching, burning, rashes, or lesions   LYMPH NODES: No enlarged glands  ENDOCRINE: No heat or cold intolerance; No hair loss  MUSCULOSKELETAL: No joint pain or swelling; No muscle, back, or extremity pain  PSYCHIATRIC: No depression, anxiety, mood swings, or difficulty sleeping  HEME/LYMPH: No easy bruising, or bleeding gums  ALLERY AND IMMUNOLOGIC: No hives or eczema    RADIOLOGY & ADDITIONAL TESTS:    Imaging Personally Reviewed:  [ ] YES  [ ] NO    Consultant(s) Notes Reviewed:  [ ] YES  [ ] NO    PHYSICAL EXAM:  GENERAL: NAD, well-groomed, well-developed  HEAD:  Atraumatic, Normocephalic  EYES: EOMI, PERRLA, conjunctiva and sclera clear  ENMT: No tonsillar erythema, exudates, or enlargement; Moist mucous membranes, Good dentition, No lesions  NECK: Supple, No JVD, Normal thyroid  NERVOUS SYSTEM:  Alert & Oriented X3, Good concentration; Motor Strength 5/5 B/L upper and lower extremities; DTRs 2+ intact and symmetric  CHEST/LUNG: Clear to percussion bilaterally; No rales, rhonchi, wheezing, or rubs  HEART: Regular rate and rhythm; No murmurs, rubs, or gallops  ABDOMEN: Soft, Nontender, Nondistended; Bowel sounds present  EXTREMITIES:  2+ Peripheral Pulses, No clubbing, cyanosis, or edema  LYMPH: No lymphadenopathy noted  SKIN: No rashes or lesions    LABS:                        8.3    5.32  )-----------( 226      ( 05 Oct 2024 07:56 )             26.9     10-06    141  |  105  |  64[H]  ----------------------------<  94  4.4   |  22  |  4.39[H]    Ca    9.0      06 Oct 2024 10:37  Mg     1.5     10-06        Urinalysis Basic - ( 06 Oct 2024 10:37 )    Color: x / Appearance: x / SG: x / pH: x  Gluc: 94 mg/dL / Ketone: x  / Bili: x / Urobili: x   Blood: x / Protein: x / Nitrite: x   Leuk Esterase: x / RBC: x / WBC x   Sq Epi: x / Non Sq Epi: x / Bacteria: x      CAPILLARY BLOOD GLUCOSE            Urinalysis Basic - ( 06 Oct 2024 10:37 )    Color: x / Appearance: x / SG: x / pH: x  Gluc: 94 mg/dL / Ketone: x  / Bili: x / Urobili: x   Blood: x / Protein: x / Nitrite: x   Leuk Esterase: x / RBC: x / WBC x   Sq Epi: x / Non Sq Epi: x / Bacteria: x        MEDICATIONS  (STANDING):  influenza  Vaccine (HIGH DOSE) 0.5 milliLiter(s) IntraMuscular once  magnesium oxide 400 milliGRAM(s) Oral once  sodium bicarbonate 650 milliGRAM(s) Oral two times a day  sodium chloride 0.45%. 1000 milliLiter(s) (60 mL/Hr) IV Continuous <Continuous>    MEDICATIONS  (PRN):  acetaminophen     Tablet .. 650 milliGRAM(s) Oral every 6 hours PRN Temp greater or equal to 38C (100.4F), Mild Pain (1 - 3)      Care Discussed with Consultants/Other Providers [ ] YES  [ ] NO
[facility]    Dr. Nikole Paul     Office [phone] (9 am to 5 pm)  Service: [phone] (5pm to 9am)        RENAL PROGRESS NOTE: DATE OF SERVICE 10-07-24 @ 10:43    Patient is a 64y old  Male who presents with a chief complaint of Several weeks of B/L LE oedema, and bladder distention (06 Oct 2024 15:47)      Patient seen and examined at bedside. No chest pain/sob    VITALS:  T(F): 98 (10-07-24 @ 04:24), Max: 98.1 (10-06-24 @ 16:47)  HR: 70 (10-07-24 @ 04:24)  BP: 158/80 (10-07-24 @ 04:24)  RR: 18 (10-07-24 @ 04:24)  SpO2: 100% (10-07-24 @ 04:24)  Wt(kg): --    10-06 @ 07:01  -  10-07 @ 07:00  --------------------------------------------------------  IN: 0 mL / OUT: 1700 mL / NET: -1700 mL          PHYSICAL EXAM:  Constitutional: NAD  Neck: No JVD  Respiratory: CTAB, no wheezes, rales or rhonchi  Cardiovascular: S1, S2, RRR  Gastrointestinal: BS+, soft, NT/ND  Extremities: No peripheral edema    Hospital Medications:   MEDICATIONS  (STANDING):  heparin   Injectable 5000 Unit(s) SubCutaneous every 12 hours  influenza  Vaccine (HIGH DOSE) 0.5 milliLiter(s) IntraMuscular once  magnesium sulfate  IVPB 2 Gram(s) IV Intermittent once  sodium bicarbonate 650 milliGRAM(s) Oral two times a day  sodium chloride 0.45%. 1000 milliLiter(s) (60 mL/Hr) IV Continuous <Continuous>      LABS:  10-06    141  |  105  |  64[H]  ----------------------------<  94  4.4   |  22  |  4.39[H]    Ca    9.0      06 Oct 2024 10:37  Mg     1.5     10-06      Creatinine Trend: 4.39 <--, 4.86 <--, 5.16 <--, 5.14 <--, 5.64 <--, 5.72 <--            Urine Studies:  Urinalysis - [10-06-24 @ 10:37]      Color  / Appearance  / SG  / pH       Gluc 94 / Ketone   / Bili  / Urobili        Blood  / Protein  / Leuk Est  / Nitrite       RBC  / WBC  / Hyaline  / Gran  / Sq Epi  / Non Sq Epi  / Bacteria     Urine Creatinine 39      [10-03-24 @ 10:57]  Urine Protein 19      [10-03-24 @ 10:57]  Urine Sodium 67      [10-03-24 @ 10:57]  Urine Urea Nitrogen 340      [10-03-24 @ 10:57]  Urine Potassium 27      [10-03-24 @ 10:57]  Urine Osmolality 315      [10-03-24 @ 10:57]    Iron 53, TIBC 363, %sat 15      [10-04-24 @ 06:11]  Ferritin 319      [10-04-24 @ 06:11]        RADIOLOGY & ADDITIONAL STUDIES:  
[facility]    Dr. Nikole Paul     Office [phone] (9 am to 5 pm)  Service: [phone] (5pm to 9am)        RENAL PROGRESS NOTE: DATE OF SERVICE 10-04-24 @ 08:38    Patient is a 64y old  Male who presents with a chief complaint of Several weeks of B/L LE oedema, and bladder distention (03 Oct 2024 19:07)      Patient seen and examined at bedside. No chest pain/sob    VITALS:  T(F): 98.6 (10-04-24 @ 08:24), Max: 98.6 (10-04-24 @ 08:24)  HR: 83 (10-04-24 @ 08:24)  BP: 151/93 (10-04-24 @ 08:24)  RR: 18 (10-04-24 @ 08:24)  SpO2: 100% (10-04-24 @ 08:24)  Wt(kg): --    10-03 @ 07:01  -  10-04 @ 07:00  --------------------------------------------------------  IN: 0 mL / OUT: 4600 mL / NET: -4600 mL      Height (cm): 182.9 (10-03 @ 09:36)  Weight (kg): 77.1 (10-03 @ 09:36)  BMI (kg/m2): 23 (10-03 @ 09:36)  BSA (m2): 1.99 (10-03 @ 09:36)    PHYSICAL EXAM:  Constitutional: NAD  Neck: No JVD  Respiratory: CTAB, no wheezes, rales or rhonchi  Cardiovascular: S1, S2, RRR  Gastrointestinal: BS+, soft, NT/ND  Extremities: No peripheral edema    Hospital Medications:   MEDICATIONS  (STANDING):  influenza  Vaccine (HIGH DOSE) 0.5 milliLiter(s) IntraMuscular once  sodium bicarbonate  Infusion 0.146 mEq/kG/Hr (75 mL/Hr) IV Continuous <Continuous>      LABS:  10-03    144  |  115[H]  |  74[H]  ----------------------------<  142[H]  4.9   |  16[L]  |  5.14[H]    Ca    8.2[L]      03 Oct 2024 18:11  Mg     2.0     10-03    TPro  7.0  /  Alb  3.8  /  TBili  0.1[L]  /  DBili      /  AST  <5[L]  /  ALT  11  /  AlkPhos  61  10-03    Creatinine Trend: 5.14 <--, 5.64 <--, 5.72 <--    Albumin: 3.8 g/dL (10-03 @ 13:24)  Albumin: 3.9 g/dL (10-03 @ 10:56)                              7.5    4.95  )-----------( 214      ( 03 Oct 2024 10:56 )             24.4     Urine Studies:  Urinalysis - [10-03-24 @ 18:11]      Color  / Appearance  / SG  / pH       Gluc 142 / Ketone   / Bili  / Urobili        Blood  / Protein  / Leuk Est  / Nitrite       RBC  / WBC  / Hyaline  / Gran  / Sq Epi  / Non Sq Epi  / Bacteria     Urine Creatinine 39      [10-03-24 @ 10:57]  Urine Protein 19      [10-03-24 @ 10:57]  Urine Sodium 67      [10-03-24 @ 10:57]  Urine Urea Nitrogen 340      [10-03-24 @ 10:57]  Urine Potassium 27      [10-03-24 @ 10:57]  Urine Osmolality 315      [10-03-24 @ 10:57]          RADIOLOGY & ADDITIONAL STUDIES:

## 2024-10-07 NOTE — DISCHARGE NOTE PROVIDER - CARE PROVIDER_API CALL
Sam Leon)  Urology  [address] [place] [address], [address] [address]  Phone: [phone]  Fax: [phone]  Follow Up Time: 1 week

## 2024-10-07 NOTE — DISCHARGE NOTE NURSING/CASE MANAGEMENT/SOCIAL WORK - PATIENT PORTAL LINK FT
You can access the FollowMyHealth Patient Portal offered by [facility] by registering at the following website: [email or web address]. By joining [facility] [facility] [employer]’[facility] FollowMyHealth portal, you will also be able to view your health information using other applications (apps) compatible with our system.

## 2024-10-07 NOTE — PROGRESS NOTE ADULT - PROVIDER SPECIALTY LIST ADULT
Nephrology
Nephrology
[Time Spent: ___ minutes] : I have spent [unfilled] minutes of time on the encounter.
Internal Medicine
Nephrology
Hospitalist
Internal Medicine
Nephrology

## 2024-10-07 NOTE — PROGRESS NOTE ADULT - ASSESSMENT
63 yo M with hx of obstructive uropathy due to BPH with recent admission to [facility] as per patient here for difficulty urinating. Nephrology called for YG and Hyperkalemia    YG on CKD  Baseline reportedly 2.4 in July 2023.   Recently SCR was 4.8 on 09/2024  Pw with SCR 5.7   Etiology Likely Obstructive Uropathy, Bedside POCUS show Bilateral hydronephrosis with flat IVC  Send Urine Na and Urine Creatinine   Patient s/p 2.7 L urine output after insertion of silva , making urine   Monitor urine output  HD was discussed with patient however he refuses to consent for procedure  He does not wish to do dialysis even if needed  No indication of RRT at this time  Renally dose meds per GFR  Strict Is/Os  Monitor for post obstructive diuresis  Renal function improving-monitor  continue IVF if not eating and drinking    Hypomagnesemia  Replete with magsulf 2 grams x1    Hyperkalemia   Resolved  S/p multiple hyperk cocktails     Acidosis  Likely in the setting of renal failure  on oral bicarb    Anemia  Has iron deficiency  start oral iron 325mg TID    Obstructive Uropathy due to BPH  follow up Urology consult for further management while inpatient   Maintain Silva
63 yo M with hx of obstructive uropathy due to BPH with recent admission to [facility] as per patient here for difficulty urinating. Nephrology called for YG and Hyperkalemia    YG on CKD  Baseline reportedly 2.4 in July 2023.   Recently SCR was 4.8 on 09/2024  Pw with SCR 5.7 now 5.6  Etiology Likely Obstructive Uropathy, Bedside POCUS show Bilateral hydronephrosis with flat IVC  Send Urine Na and Urine Creatinine   Patient s/p 2.7 L urine output after insertion of silva , making urine   Monitor urine output  HD was discussed with patient however he refuses to consent for procedure  He does not wish to do dialysis even if needed  No indication of RRT at this time  Followup AM labs if CO2 >20 will change fluids to isotonic   Renally dose meds per GFR  Strict Is/Os  Monitor for post obstructive diuresis    Hyperkalemia   Resolved  S/p multiple hyperk cocktails     Acidosis  Likely in the setting of renal failure  fu AM labs    Anemia  Hgb of 7.5  Can send Iron TIBC and Ferritin Levels    Obstructive Uropathy due to BPH  Obtain Urology consult for further management while inpatient   Maintain Silva
Problem/Plan - 1:  ·  Problem: Acute kidney injury superimposed on CKD.   ·  Plan: Self referral by patient with several weeks of B/L LE oedema and urinary retention symptoms in the setting of patient with poor compliance with followup and recommendations by his physicians with now S/P Pratt placement and HCO3 gtt.      Patient refuses to resume his Proscar and Finasteride.      renal and urology fu     Problem/Plan - 2:  ·  Problem: Obstructive uropathy.   ·  Plan: urology fu     Problem/Plan - 3:  ·  Problem: Abnormal EKG.   ·  Plan: Patient with inferior Q waves and poor R wave progression on EKG with B/L LE oedema.   Suspected troponin leak.  Will obtain limited echo.    Problem/Plan - 4:  ·  Problem: Bilateral lower extremity edema.   ·  Plan: Patient refuses pharmacologic DVT prophylaxis.   Will obtain B/L Duplex to exclude occult DVT.
Problem/Plan - 1:  ·  Problem: Acute kidney injury superimposed on CKD.   ·  Plan: Self referral by patient with several weeks of B/L LE oedema and urinary retention symptoms in the setting of patient with poor compliance with followup and recommendations by his physicians with now S/P Silva placement and now change to oral bicarb    Patient refuses to resume his Proscar and Finasteride.      renal and urology fu     Problem/Plan - 2:  ·  Problem: Obstructive uropathy.   ·  Plan: urology fu :   urology recommend in-patient bladder procedure , if not able to schedule then plan for d/c with silva and leg bag    Problem/Plan - 3:  ·  Problem: Abnormal EKG.   ·  Plan: denies any CP   echo     Problem/Plan - 4:  ·  Problem: Bilateral lower extremity edema.   ·  Plan: Patient refuses pharmacologic DVT prophylaxis.    dispo: please obtain B/L Duplex to exclude occult DVT.    cande watson MD  covering dr burr
A/P    Problem/Plan - 1:  ·  Problem: Acute kidney injury superimposed on CKD.   ·  Plan: Self referral by patient with several weeks of B/L LE oedema and urinary retention symptoms in the setting of patient with poor compliance with followup and recommendations by his physicians with now S/P Silva placement and now change to oral bicarb    Patient refuses to resume his Proscar and Finasteride.      renal and urology fu   renal following     Problem/Plan - 2:  ·  Problem: Obstructive uropathy.   ·  Plan: urology fu :   urology recommend in-patient bladder procedure , if not able to schedule then plan for d/c with silva and leg bag    Problem/Plan - 3:  ·  Problem: Abnormal EKG.   ·  Plan: denies any CP   echo     Problem/Plan - 4:  ·  Problem: Bilateral lower extremity edema.   ·  Plan: Patient refuses pharmacologic DVT prophylaxis.  venous doppler : Acute below the knee DVT confined to the gastrocnemius vein of the left   calf.    dispo: will start sq heparin 5000 units for positive below knee DVT , don't think he need full AC , however he would need repeat doppler in few weeks to see any propogation of DVT to above knee     cande watson MD  covering dr burr
63 yo M with hx of obstructive uropathy due to BPH with recent admission to [facility] as per patient here for difficulty urinating. Nephrology called for YG and Hyperkalemia    YG on CKD  Baseline reportedly 2.4 in July 2023.   Recently SCR was 4.8 on 09/2024  Pw with SCR 5.7   Etiology Likely Obstructive Uropathy, Bedside POCUS show Bilateral hydronephrosis with flat IVC  Send Urine Na and Urine Creatinine   Patient s/p 2.7 L urine output after insertion of silva , making urine   Monitor urine output  HD was discussed with patient however he refuses to consent for procedure  He does not wish to do dialysis even if needed  No indication of RRT at this time  Renally dose meds per GFR  Strict Is/Os  Monitor for post obstructive diuresis  Renal function improving-monitor  change IVF 1/2NS-60cc/hr    Hyperkalemia   Resolved  S/p multiple hyperk cocktails     Acidosis  Likely in the setting of renal failure  on oral bicarb    Anemia  Has iron deficiency  start oral iron 325mg TID    Obstructive Uropathy due to BPH  follow up Urology consult for further management while inpatient   Maintain Silva
63 yo M with hx of obstructive uropathy due to BPH with recent admission to [facility] as per patient here for difficulty urinating. Nephrology called for YG and Hyperkalemia    YG on CKD  Baseline reportedly 2.4 in July 2023.   Recently SCR was 4.8 on 09/2024  Pw with SCR 5.7   Etiology Likely Obstructive Uropathy, Bedside POCUS show Bilateral hydronephrosis with flat IVC  Send Urine Na and Urine Creatinine   Patient s/p 2.7 L urine output after insertion of silva , making urine   Monitor urine output  HD was discussed with patient however he refuses to consent for procedure  He does not wish to do dialysis even if needed  No indication of RRT at this time  Renally dose meds per GFR  Strict Is/Os  Monitor for post obstructive diuresis  Renal function improving-monitor  continue IVF 1/2NS-60cc/hr    Hyperkalemia   Resolved  S/p multiple hyperk cocktails     Acidosis  Likely in the setting of renal failure  on oral bicarb    Anemia  Has iron deficiency  start oral iron 325mg TID    Obstructive Uropathy due to BPH  follow up Urology consult for further management while inpatient   Maintain Silva

## 2024-10-11 PROBLEM — Z28.21 IMMUNIZATION NOT CARRIED OUT BECAUSE OF PATIENT REFUSAL: Chronic | Status: ACTIVE | Noted: 2024-10-03

## 2024-10-11 PROBLEM — N17.9 ACUTE KIDNEY FAILURE, UNSPECIFIED: Chronic | Status: ACTIVE | Noted: 2024-10-03

## 2024-10-11 PROBLEM — R60.0 LOCALIZED EDEMA: Chronic | Status: ACTIVE | Noted: 2024-10-03

## 2024-10-11 PROBLEM — N13.9 OBSTRUCTIVE AND REFLUX UROPATHY, UNSPECIFIED: Chronic | Status: ACTIVE | Noted: 2024-10-03

## 2024-12-13 ENCOUNTER — LABORATORY RESULT (OUTPATIENT)
Age: 64
End: 2024-12-13

## 2024-12-13 ENCOUNTER — APPOINTMENT (OUTPATIENT)
Dept: INTERNAL MEDICINE | Facility: CLINIC | Age: 64
End: 2024-12-13
Payer: MEDICAID

## 2024-12-13 VITALS
HEIGHT: 72 IN | DIASTOLIC BLOOD PRESSURE: 78 MMHG | OXYGEN SATURATION: 100 % | TEMPERATURE: 98.2 F | HEART RATE: 74 BPM | WEIGHT: 158 LBS | BODY MASS INDEX: 21.4 KG/M2 | SYSTOLIC BLOOD PRESSURE: 121 MMHG

## 2024-12-13 DIAGNOSIS — N13.8 BENIGN PROSTATIC HYPERPLASIA WITH LOWER URINARY TRACT SYMPMS: ICD-10-CM

## 2024-12-13 DIAGNOSIS — Z12.11 ENCOUNTER FOR SCREENING FOR MALIGNANT NEOPLASM OF COLON: ICD-10-CM

## 2024-12-13 DIAGNOSIS — Z00.00 ENCOUNTER FOR GENERAL ADULT MEDICAL EXAMINATION W/OUT ABNORMAL FINDINGS: ICD-10-CM

## 2024-12-13 DIAGNOSIS — Z13.228 ENCOUNTER FOR SCREENING FOR OTHER METABOLIC DISORDERS: ICD-10-CM

## 2024-12-13 DIAGNOSIS — N40.1 BENIGN PROSTATIC HYPERPLASIA WITH LOWER URINARY TRACT SYMPMS: ICD-10-CM

## 2024-12-13 PROCEDURE — 99204 OFFICE O/P NEW MOD 45 MIN: CPT | Mod: 25

## 2024-12-13 PROCEDURE — 36415 COLL VENOUS BLD VENIPUNCTURE: CPT

## 2024-12-18 RX ORDER — CIPROFLOXACIN HYDROCHLORIDE 500 MG/1
500 TABLET, FILM COATED ORAL
Qty: 10 | Refills: 0 | Status: ACTIVE | COMMUNITY
Start: 2024-12-18 | End: 1900-01-01

## 2025-01-02 DIAGNOSIS — R19.5 OTHER FECAL ABNORMALITIES: ICD-10-CM

## 2025-01-17 ENCOUNTER — APPOINTMENT (OUTPATIENT)
Dept: INTERNAL MEDICINE | Facility: CLINIC | Age: 65
End: 2025-01-17

## 2025-01-28 ENCOUNTER — OUTPATIENT (OUTPATIENT)
Dept: OUTPATIENT SERVICES | Facility: HOSPITAL | Age: 65
LOS: 1 days | End: 2025-01-28
Payer: MEDICAID

## 2025-01-28 VITALS
TEMPERATURE: 98 F | SYSTOLIC BLOOD PRESSURE: 125 MMHG | HEART RATE: 92 BPM | WEIGHT: 164.91 LBS | HEIGHT: 72 IN | DIASTOLIC BLOOD PRESSURE: 76 MMHG | RESPIRATION RATE: 16 BRPM | OXYGEN SATURATION: 95 %

## 2025-01-28 DIAGNOSIS — I82.409 ACUTE EMBOLISM AND THROMBOSIS OF UNSPECIFIED DEEP VEINS OF UNSPECIFIED LOWER EXTREMITY: ICD-10-CM

## 2025-01-28 DIAGNOSIS — D49.4 NEOPLASM OF UNSPECIFIED BEHAVIOR OF BLADDER: ICD-10-CM

## 2025-01-28 LAB
ANION GAP SERPL CALC-SCNC: 17 MMOL/L — SIGNIFICANT CHANGE UP (ref 5–17)
BLD GP AB SCN SERPL QL: NEGATIVE — SIGNIFICANT CHANGE UP
BUN SERPL-MCNC: 85 MG/DL — HIGH (ref 7–23)
CALCIUM SERPL-MCNC: 8.9 MG/DL — SIGNIFICANT CHANGE UP (ref 8.4–10.5)
CHLORIDE SERPL-SCNC: 109 MMOL/L — HIGH (ref 96–108)
CO2 SERPL-SCNC: 15 MMOL/L — LOW (ref 22–31)
CREAT SERPL-MCNC: 5.16 MG/DL — HIGH (ref 0.5–1.3)
EGFR: 12 ML/MIN/1.73M2 — LOW
GLUCOSE SERPL-MCNC: 132 MG/DL — HIGH (ref 70–99)
HCT VFR BLD CALC: 31.7 % — LOW (ref 39–50)
HGB BLD-MCNC: 10.1 G/DL — LOW (ref 13–17)
MCHC RBC-ENTMCNC: 29.6 PG — SIGNIFICANT CHANGE UP (ref 27–34)
MCHC RBC-ENTMCNC: 31.9 G/DL — LOW (ref 32–36)
MCV RBC AUTO: 93 FL — SIGNIFICANT CHANGE UP (ref 80–100)
NRBC # BLD: 0 /100 WBCS — SIGNIFICANT CHANGE UP (ref 0–0)
NRBC BLD-RTO: 0 /100 WBCS — SIGNIFICANT CHANGE UP (ref 0–0)
PLATELET # BLD AUTO: 232 K/UL — SIGNIFICANT CHANGE UP (ref 150–400)
POTASSIUM SERPL-MCNC: 5.2 MMOL/L — SIGNIFICANT CHANGE UP (ref 3.5–5.3)
POTASSIUM SERPL-SCNC: 5.2 MMOL/L — SIGNIFICANT CHANGE UP (ref 3.5–5.3)
RBC # BLD: 3.41 M/UL — LOW (ref 4.2–5.8)
RBC # FLD: 14.8 % — HIGH (ref 10.3–14.5)
RH IG SCN BLD-IMP: POSITIVE — SIGNIFICANT CHANGE UP
SODIUM SERPL-SCNC: 141 MMOL/L — SIGNIFICANT CHANGE UP (ref 135–145)
WBC # BLD: 5.89 K/UL — SIGNIFICANT CHANGE UP (ref 3.8–10.5)
WBC # FLD AUTO: 5.89 K/UL — SIGNIFICANT CHANGE UP (ref 3.8–10.5)

## 2025-01-28 PROCEDURE — 86901 BLOOD TYPING SEROLOGIC RH(D): CPT

## 2025-01-28 PROCEDURE — 87086 URINE CULTURE/COLONY COUNT: CPT

## 2025-01-28 PROCEDURE — 85027 COMPLETE CBC AUTOMATED: CPT

## 2025-01-28 PROCEDURE — G0463: CPT

## 2025-01-28 PROCEDURE — 80048 BASIC METABOLIC PNL TOTAL CA: CPT

## 2025-01-28 PROCEDURE — 86850 RBC ANTIBODY SCREEN: CPT

## 2025-01-28 PROCEDURE — 86900 BLOOD TYPING SEROLOGIC ABO: CPT

## 2025-01-28 NOTE — H&P PST ADULT - HISTORY OF PRESENT ILLNESS
64 year old male with PMH BPH with LUTS (requiring silva insertion 10/2024 during hospital admission) reports c/o incontinence of urine right after falling asleep for about 2-3 months. He c/o dysuria for several months, urinary frequency and urinary urgency. He denies fever, chills or flank pain. Pt now presents to [facility] for scheduled saline bipolar transurethral resection of the prostate and bladder biopsy with Dr. Garcia on 2/11/25 at the ambulatory care center.  64 year old male with PMH BPH with LUTS reports c/o incontinence of urine right after falling asleep for about 2-3 months. He c/o dysuria for several months, urinary frequency and urinary urgency. He denies fever, chills or flank pain. He was admitted 7/2024 and readmitted 10/3/24-10/27/24 at [facility] with c/o 2 months of urinary incontinence and was noted to have an acute kidney injury and obstructive uropathy and had silva placement at that time. He followed up outpatient and was evaluated by Dr. Du (7/22/24) and his silva was discontinued. Pt now presents to [facility] for scheduled saline bipolar transurethral resection of the prostate and bladder biopsy with Dr. Garcia on 2/11/25 at the ambulatory care center.  64 year old male with no PMH recently was hospitalized in 10/2024 due to c/o several months of lower extremity swelling, urinary incontinence and bladder distention. He has self-referred himself to the hospital several times prior to October with similar complaints however has not been compliant with follow up. He followed up outpatient with urologist and his silva catheter was discontinued. He reports c/o dysuria for several months, urinary frequency and urinary urgency. He denies fever, chills or flank pain. Pt now presents to [facility] for scheduled saline bipolar transurethral resection of the prostate and bladder biopsy with Dr. Garcia on 2/11/25 at the [facility] care [place].     **Of note, A B/L lower extremity duplex was performed on 10/4/24 which revealed an acute below the knee DVT confined to the gastrocnemius vein of the left calf. After reviewing several notes from his hospital stay, he was noncompliant with treatment and was pulling out IVs and refusing blood draws. On hospital discharge note with hospitalist Dr. Barone, it states "start SQ heparin 5,000 units for positive below knee DVT, don't think he'll need full AC, however he would need repeat doppler in a few weeks to see any propagation of the DVT to above the knee." When I asked the patient about the DVT he was confused and does not recall having any sort of blood clot. He has not followed up with his PCP or any other specialists and has not had a repeat duplex performed. Patient is going for M/E with appointment to be scheduled. Patient also states that he will not be accepting any blood products during surgery. Surgeon and anesthesia team notified via email.**

## 2025-01-28 NOTE — H&P PST ADULT - ASSESSMENT
DASI score: 7.25  DASI activity: Able to walk 2-3 blocks, ADLs, Grocery Shopping, 1 Flight of Stairs, uses rowing machine 3-4x per week for about 10 minutes and 15 minutes of calisthenics   Loose teeth or denture: denies  No

## 2025-01-28 NOTE — H&P PST ADULT - NEGATIVE GENERAL GENITOURINARY SYMPTOMS
no hematuria/no renal colic/no flank pain L/no flank pain R/no urine discoloration/no gas in urine/no bladder infections/no urinary hesitancy

## 2025-01-28 NOTE — H&P PST ADULT - NSICDXPASTMEDICALHX_GEN_ALL_CORE_FT
PAST MEDICAL HISTORY:  Acute kidney injury superimposed on CKD     COVID-19 vaccination refused     Lower extremity edema     Neoplasm of unspecified behavior of bladder     Obstructive uropathy     Retention of urine, unspecified      PAST MEDICAL HISTORY:  Acute kidney injury superimposed on CKD     COVID-19 vaccination refused     Lower extremity edema     Neoplasm of unspecified behavior of bladder     Obstructive uropathy     Refusal of blood product     Retention of urine, unspecified      PAST MEDICAL HISTORY:  Acute kidney injury superimposed on CKD     COVID-19 vaccination refused     DVT, lower extremity     Lower extremity edema     Neoplasm of unspecified behavior of bladder     Obstructive uropathy     Refusal of blood product     Retention of urine, unspecified

## 2025-01-28 NOTE — H&P PST ADULT - PROBLEM SELECTOR PLAN 2
Email sent to surgeon and anesthesia team regarding positive DVT with no doppler or outpatient F/U and no A/C at this time

## 2025-01-28 NOTE — H&P PST ADULT - PROBLEM SELECTOR PLAN 1
Pt is scheduled for a saline bipolar transurethral resection of the orpstate and bladder biopsy with Dr. Garcia on 2/11/25  CBC, BMP, T/S and urine culture ordered and obtained at [facility]   ABO on admit

## 2025-01-28 NOTE — H&P PST ADULT - REASON FOR ADMISSION
"I am having a prostate biopsy." "I am having a bladder biopsy and they are shaving down my prostate."

## 2025-01-28 NOTE — H&P PST ADULT - NSANTHTOTALSCORECAL_ENT_A_CORE
2 Minocycline Counseling: Patient advised regarding possible photosensitivity and discoloration of the teeth, skin, lips, tongue and gums.  Patient instructed to avoid sunlight, if possible.  When exposed to sunlight, patients should wear protective clothing, sunglasses, and sunscreen.  The patient was instructed to call the office immediately if the following severe adverse effects occur:  hearing changes, easy bruising/bleeding, severe headache, or vision changes.  The patient verbalized understanding of the proper use and possible adverse effects of minocycline.  All of the patient's questions and concerns were addressed.

## 2025-01-29 LAB
CULTURE RESULTS: SIGNIFICANT CHANGE UP
SPECIMEN SOURCE: SIGNIFICANT CHANGE UP

## 2025-02-13 ENCOUNTER — APPOINTMENT (OUTPATIENT)
Dept: INTERNAL MEDICINE | Facility: CLINIC | Age: 65
End: 2025-02-13
Payer: MEDICAID

## 2025-02-13 ENCOUNTER — LABORATORY RESULT (OUTPATIENT)
Age: 65
End: 2025-02-13

## 2025-02-13 VITALS
BODY MASS INDEX: 20.32 KG/M2 | TEMPERATURE: 98.2 F | OXYGEN SATURATION: 98 % | HEIGHT: 72 IN | DIASTOLIC BLOOD PRESSURE: 94 MMHG | HEART RATE: 80 BPM | SYSTOLIC BLOOD PRESSURE: 163 MMHG | WEIGHT: 150 LBS

## 2025-02-13 DIAGNOSIS — R10.9 UNSPECIFIED ABDOMINAL PAIN: ICD-10-CM

## 2025-02-13 DIAGNOSIS — L29.9 PRURITUS, UNSPECIFIED: ICD-10-CM

## 2025-02-13 DIAGNOSIS — Z13.228 ENCOUNTER FOR SCREENING FOR OTHER METABOLIC DISORDERS: ICD-10-CM

## 2025-02-13 PROBLEM — Z78.9 OTHER SPECIFIED HEALTH STATUS: Chronic | Status: ACTIVE | Noted: 2025-01-28

## 2025-02-13 PROBLEM — D49.4 NEOPLASM OF UNSPECIFIED BEHAVIOR OF BLADDER: Chronic | Status: ACTIVE | Noted: 2025-01-28

## 2025-02-13 PROBLEM — I82.409 ACUTE EMBOLISM AND THROMBOSIS OF UNSPECIFIED DEEP VEINS OF UNSPECIFIED LOWER EXTREMITY: Chronic | Status: ACTIVE | Noted: 2025-01-28

## 2025-02-13 PROBLEM — R33.9 RETENTION OF URINE, UNSPECIFIED: Chronic | Status: ACTIVE | Noted: 2025-01-28

## 2025-02-13 PROCEDURE — 36415 COLL VENOUS BLD VENIPUNCTURE: CPT

## 2025-02-13 PROCEDURE — 99214 OFFICE O/P EST MOD 30 MIN: CPT | Mod: 25

## 2025-02-13 RX ORDER — TRIAMCINOLONE ACETONIDE 1 MG/G
0.1 CREAM TOPICAL TWICE DAILY
Qty: 1 | Refills: 2 | Status: ACTIVE | COMMUNITY
Start: 2025-02-13 | End: 1900-01-01

## 2025-02-13 RX ORDER — METRONIDAZOLE 500 MG/1
500 TABLET ORAL TWICE DAILY
Qty: 14 | Refills: 0 | Status: ACTIVE | COMMUNITY
Start: 2025-02-13 | End: 1900-01-01

## 2025-02-17 ENCOUNTER — INPATIENT (INPATIENT)
Facility: HOSPITAL | Age: 65
LOS: 2 days | Discharge: LEFT BEFORE DISCHARGE | DRG: 726 | End: 2025-02-20
Attending: INTERNAL MEDICINE | Admitting: STUDENT IN AN ORGANIZED HEALTH CARE EDUCATION/TRAINING PROGRAM
Payer: MEDICAID

## 2025-02-17 VITALS
TEMPERATURE: 98 F | RESPIRATION RATE: 18 BRPM | SYSTOLIC BLOOD PRESSURE: 134 MMHG | DIASTOLIC BLOOD PRESSURE: 86 MMHG | HEIGHT: 72 IN | HEART RATE: 74 BPM | WEIGHT: 149.91 LBS | OXYGEN SATURATION: 99 %

## 2025-02-17 LAB
ALBUMIN SERPL ELPH-MCNC: 3.9 G/DL — SIGNIFICANT CHANGE UP (ref 3.3–5)
ALBUMIN SERPL ELPH-MCNC: 4.4 G/DL
ALP BLD-CCNC: 86 U/L
ALP SERPL-CCNC: 81 U/L — SIGNIFICANT CHANGE UP (ref 40–120)
ALT FLD-CCNC: 9 U/L — LOW (ref 10–45)
ALT SERPL-CCNC: 8 U/L
ANION GAP SERPL CALC-SCNC: 13 MMOL/L — SIGNIFICANT CHANGE UP (ref 5–17)
ANION GAP SERPL CALC-SCNC: 16 MMOL/L — SIGNIFICANT CHANGE UP (ref 5–17)
ANION GAP SERPL CALC-SCNC: 17 MMOL/L
APPEARANCE UR: CLEAR — SIGNIFICANT CHANGE UP
APPEARANCE: CLEAR
AST SERPL-CCNC: <5 U/L
AST SERPL-CCNC: <5 U/L — LOW (ref 10–40)
BACTERIA # UR AUTO: NEGATIVE /HPF — SIGNIFICANT CHANGE UP
BASOPHILS # BLD AUTO: 0.01 K/UL
BASOPHILS # BLD AUTO: 0.01 K/UL — SIGNIFICANT CHANGE UP (ref 0–0.2)
BASOPHILS NFR BLD AUTO: 0.2 %
BASOPHILS NFR BLD AUTO: 0.2 % — SIGNIFICANT CHANGE UP (ref 0–2)
BILIRUB SERPL-MCNC: 0.1 MG/DL — LOW (ref 0.2–1.2)
BILIRUB SERPL-MCNC: 0.2 MG/DL
BILIRUB UR-MCNC: NEGATIVE — SIGNIFICANT CHANGE UP
BILIRUBIN URINE: NEGATIVE
BLOOD URINE: ABNORMAL
BUN SERPL-MCNC: 66 MG/DL — HIGH (ref 7–23)
BUN SERPL-MCNC: 68 MG/DL — HIGH (ref 7–23)
BUN SERPL-MCNC: 69 MG/DL
CALCIUM SERPL-MCNC: 9 MG/DL — SIGNIFICANT CHANGE UP (ref 8.4–10.5)
CALCIUM SERPL-MCNC: 9.1 MG/DL — SIGNIFICANT CHANGE UP (ref 8.4–10.5)
CALCIUM SERPL-MCNC: 9.5 MG/DL
CAST: 0 /LPF — SIGNIFICANT CHANGE UP (ref 0–4)
CHLORIDE SERPL-SCNC: 105 MMOL/L
CHLORIDE SERPL-SCNC: 112 MMOL/L — HIGH (ref 96–108)
CHLORIDE SERPL-SCNC: 113 MMOL/L — HIGH (ref 96–108)
CHOLEST SERPL-MCNC: 254 MG/DL
CO2 SERPL-SCNC: 14 MMOL/L — LOW (ref 22–31)
CO2 SERPL-SCNC: 15 MMOL/L
CO2 SERPL-SCNC: 15 MMOL/L — LOW (ref 22–31)
COLOR SPEC: YELLOW — SIGNIFICANT CHANGE UP
COLOR: YELLOW
CREAT SERPL-MCNC: 6.1 MG/DL — HIGH (ref 0.5–1.3)
CREAT SERPL-MCNC: 6.17 MG/DL — HIGH (ref 0.5–1.3)
CREAT SERPL-MCNC: 6.57 MG/DL
DIFF PNL FLD: NEGATIVE — SIGNIFICANT CHANGE UP
EGFR: 9 ML/MIN/1.73M2
EGFR: 9 ML/MIN/1.73M2 — LOW
EGFR: 9 ML/MIN/1.73M2 — LOW
EOSINOPHIL # BLD AUTO: 0.03 K/UL
EOSINOPHIL # BLD AUTO: 0.08 K/UL — SIGNIFICANT CHANGE UP (ref 0–0.5)
EOSINOPHIL NFR BLD AUTO: 0.6 %
EOSINOPHIL NFR BLD AUTO: 1.2 % — SIGNIFICANT CHANGE UP (ref 0–6)
FERRITIN SERPL-MCNC: 411 NG/ML
GAS PNL BLDV: SIGNIFICANT CHANGE UP
GGT SERPL-CCNC: 14 U/L
GLUCOSE QUALITATIVE U: NEGATIVE MG/DL
GLUCOSE SERPL-MCNC: 85 MG/DL
GLUCOSE SERPL-MCNC: 89 MG/DL — SIGNIFICANT CHANGE UP (ref 70–99)
GLUCOSE SERPL-MCNC: 90 MG/DL — SIGNIFICANT CHANGE UP (ref 70–99)
GLUCOSE UR QL: NEGATIVE MG/DL — SIGNIFICANT CHANGE UP
HAV IGM SER QL: NONREACTIVE
HBV CORE IGM SER QL: NONREACTIVE
HBV SURFACE AG SER QL: NONREACTIVE
HCT VFR BLD CALC: 31.8 % — LOW (ref 39–50)
HCT VFR BLD CALC: 35.8 %
HCV AB SER QL: NONREACTIVE
HCV S/CO RATIO: 0.08 S/CO
HDLC SERPL-MCNC: 48 MG/DL
HGB BLD-MCNC: 10.2 G/DL — LOW (ref 13–17)
HGB BLD-MCNC: 11.2 G/DL
IMM GRANULOCYTES NFR BLD AUTO: 0.2 %
IMM GRANULOCYTES NFR BLD AUTO: 0.3 % — SIGNIFICANT CHANGE UP (ref 0–0.9)
IRON SATN MFR SERPL: 20 %
IRON SERPL-MCNC: 49 UG/DL
KETONES UR-MCNC: NEGATIVE MG/DL — SIGNIFICANT CHANGE UP
KETONES URINE: NEGATIVE MG/DL
LDLC SERPL CALC-MCNC: 179 MG/DL
LEUKOCYTE ESTERASE UR-ACNC: ABNORMAL
LEUKOCYTE ESTERASE URINE: NEGATIVE
LPL SERPL-CCNC: 45 U/L
LYMPHOCYTES # BLD AUTO: 0.96 K/UL
LYMPHOCYTES # BLD AUTO: 1.05 K/UL — SIGNIFICANT CHANGE UP (ref 1–3.3)
LYMPHOCYTES # BLD AUTO: 15.9 % — SIGNIFICANT CHANGE UP (ref 13–44)
LYMPHOCYTES NFR BLD AUTO: 18.3 %
MAN DIFF?: NORMAL
MCHC RBC-ENTMCNC: 28.7 PG
MCHC RBC-ENTMCNC: 29.3 PG — SIGNIFICANT CHANGE UP (ref 27–34)
MCHC RBC-ENTMCNC: 31.3 G/DL
MCHC RBC-ENTMCNC: 32.1 G/DL — SIGNIFICANT CHANGE UP (ref 32–36)
MCV RBC AUTO: 91.4 FL — SIGNIFICANT CHANGE UP (ref 80–100)
MCV RBC AUTO: 91.8 FL
MONOCYTES # BLD AUTO: 0.53 K/UL
MONOCYTES # BLD AUTO: 0.61 K/UL — SIGNIFICANT CHANGE UP (ref 0–0.9)
MONOCYTES NFR BLD AUTO: 10.1 %
MONOCYTES NFR BLD AUTO: 9.2 % — SIGNIFICANT CHANGE UP (ref 2–14)
NEUTROPHILS # BLD AUTO: 3.71 K/UL
NEUTROPHILS # BLD AUTO: 4.84 K/UL — SIGNIFICANT CHANGE UP (ref 1.8–7.4)
NEUTROPHILS NFR BLD AUTO: 70.6 %
NEUTROPHILS NFR BLD AUTO: 73.2 % — SIGNIFICANT CHANGE UP (ref 43–77)
NITRITE UR-MCNC: NEGATIVE — SIGNIFICANT CHANGE UP
NITRITE URINE: NEGATIVE
NONHDLC SERPL-MCNC: 206 MG/DL
NRBC BLD AUTO-RTO: 0 /100 WBCS — SIGNIFICANT CHANGE UP (ref 0–0)
PH UR: 6.5 — SIGNIFICANT CHANGE UP (ref 5–8)
PH URINE: 6.5
PLATELET # BLD AUTO: 177 K/UL — SIGNIFICANT CHANGE UP (ref 150–400)
PLATELET # BLD AUTO: 212 K/UL
POTASSIUM SERPL-MCNC: 5.3 MMOL/L — SIGNIFICANT CHANGE UP (ref 3.5–5.3)
POTASSIUM SERPL-MCNC: 6.2 MMOL/L — CRITICAL HIGH (ref 3.5–5.3)
POTASSIUM SERPL-SCNC: 5.3 MMOL/L — SIGNIFICANT CHANGE UP (ref 3.5–5.3)
POTASSIUM SERPL-SCNC: 5.8 MMOL/L
POTASSIUM SERPL-SCNC: 6.2 MMOL/L — CRITICAL HIGH (ref 3.5–5.3)
PROT SERPL-MCNC: 7 G/DL — SIGNIFICANT CHANGE UP (ref 6–8.3)
PROT SERPL-MCNC: 7.1 G/DL
PROT UR-MCNC: 100 MG/DL
PROTEIN URINE: 100 MG/DL
PSA SERPL-MCNC: 7.94 NG/ML
RBC # BLD: 3.48 M/UL — LOW (ref 4.2–5.8)
RBC # BLD: 3.9 M/UL
RBC # FLD: 14.8 %
RBC # FLD: 15.1 % — HIGH (ref 10.3–14.5)
RBC CASTS # UR COMP ASSIST: 2 /HPF — SIGNIFICANT CHANGE UP (ref 0–4)
SODIUM SERPL-SCNC: 137 MMOL/L
SODIUM SERPL-SCNC: 140 MMOL/L — SIGNIFICANT CHANGE UP (ref 135–145)
SODIUM SERPL-SCNC: 143 MMOL/L — SIGNIFICANT CHANGE UP (ref 135–145)
SP GR SPEC: 1.01 — SIGNIFICANT CHANGE UP (ref 1–1.03)
SPECIFIC GRAVITY URINE: 1.01
SQUAMOUS # UR AUTO: 0 /HPF — SIGNIFICANT CHANGE UP (ref 0–5)
TIBC SERPL-MCNC: 241 UG/DL
TRIGL SERPL-MCNC: 145 MG/DL
UIBC SERPL-MCNC: 192 UG/DL
UROBILINOGEN FLD QL: 0.2 MG/DL — SIGNIFICANT CHANGE UP (ref 0.2–1)
UROBILINOGEN URINE: 0.2 MG/DL
WBC # BLD: 6.61 K/UL — SIGNIFICANT CHANGE UP (ref 3.8–10.5)
WBC # FLD AUTO: 5.25 K/UL
WBC # FLD AUTO: 6.61 K/UL — SIGNIFICANT CHANGE UP (ref 3.8–10.5)
WBC UR QL: 4 /HPF — SIGNIFICANT CHANGE UP (ref 0–5)

## 2025-02-17 PROCEDURE — 99285 EMERGENCY DEPT VISIT HI MDM: CPT

## 2025-02-17 RX ORDER — DEXTROSE 50 % IN WATER 50 %
50 SYRINGE (ML) INTRAVENOUS ONCE
Refills: 0 | Status: COMPLETED | OUTPATIENT
Start: 2025-02-17 | End: 2025-02-17

## 2025-02-17 RX ORDER — CALCIUM GLUCONATE 20 MG/ML
2 INJECTION, SOLUTION INTRAVENOUS ONCE
Refills: 0 | Status: COMPLETED | OUTPATIENT
Start: 2025-02-17 | End: 2025-02-17

## 2025-02-17 RX ORDER — SODIUM ZIRCONIUM CYCLOSILICATE 5 G/5G
10 POWDER, FOR SUSPENSION ORAL ONCE
Refills: 0 | Status: COMPLETED | OUTPATIENT
Start: 2025-02-17 | End: 2025-02-17

## 2025-02-17 RX ADMIN — CALCIUM GLUCONATE 200 GRAM(S): 20 INJECTION, SOLUTION INTRAVENOUS at 22:52

## 2025-02-17 RX ADMIN — Medication 50 MILLILITER(S): at 23:37

## 2025-02-17 RX ADMIN — Medication 5 UNIT(S): at 23:37

## 2025-02-17 RX ADMIN — SODIUM ZIRCONIUM CYCLOSILICATE 10 GRAM(S): 5 POWDER, FOR SUSPENSION ORAL at 22:52

## 2025-02-17 NOTE — ED ADULT NURSE REASSESSMENT NOTE - NS ED NURSE REASSESS COMMENT FT1
Indwelling urinary catheter placed using aseptic technique. Sterile equipment utalized. Second RN present to confirm sterility. Draining to gravity - initial output of 1400ml. Secured w/ stat lock to upper leg. Explained procedure as it was being done - Pt tolerated procedure well. Comfort and safety provided.

## 2025-02-17 NOTE — ED PROVIDER NOTE - ATTENDING CONTRIBUTION TO CARE
Pt notified. Verbalized understanding   Yoly Ashford DO EM Attending  64 year old male with PMH BPH, YG on CKD, obstructive uropathy, urinary retention requiring silva and multiple admissions in the past presents with urinary retention and abnormal labs (elevated Cr and BUN) on outpatient labs today. patient reports itching of the body. Denies rash, fever, chills, chest pain, shortness of breath, nausea, vomiting, diarrhea. Reports abdominal distension and difficulty voiding.    PHYSICAL EXAM:  CONSTITUTIONAL: Well appearing, awake, alert, oriented to person, place, time/situation and in no apparent distress.  HEAD: Atraumatic  EYES: Clear bilaterally, pupils equal, round and reactive to light.  ENMT: Airway patent, Nasal mucosa clear. Mouth with normal mucosa. Uvula is midline.   CARDIAC: Normal rate, regular rhythm. +S1/S2. No murmurs, rubs or gallops.  RESPIRATORY: Breathing unlabored. Breath sounds clear and equal bilaterally.  ABDOMEN:  Soft, nontender, distended bladder. No rebound tenderness or guarding.  NEUROLOGICAL: Alert and oriented, no focal deficits, no motor or sensory deficits.  MSK: No clubbing, cyanosis, or edema. Full range of motion of all extremities.   SKIN: Skin warm and dry. No evidence of rashes or lesions.    POCUS with >1L post void residual. DDx concerning for yg, hematologic/electrolyte abnormalities, urinary retention, yg on ckd. Will obtain labs, UA, place silva, and re-assess.

## 2025-02-17 NOTE — ED PROVIDER NOTE - PROGRESS NOTE DETAILS
Carrol Montez PGY-1:  Cr 6.17 and potassium 6.2. will treat hyperkalemia and repeat BMP. Carrol Montez PGY-1:  Cr 6.17 and potassium 6.2. will treat hyperkalemia and repeat BMP. will need admission for al. Kathy Velásquez PGY3: Pt endorsed to me at sign out. Pt here for worsening BUN/Cr. Found to have urinary retention now s/p Pratt placement. Hyperkalemia treated by previous team, now improved. Pt endorsed to hospitalist for admission.

## 2025-02-17 NOTE — ED ADULT TRIAGE NOTE - GLASGOW COMA SCALE: EYE OPENING, MLM
Consent: Written consent was obtained and risks were reviewed including but not limited to scarring, infection, bleeding, scabbing, incomplete removal, nerve damage and allergy to anesthesia. (E4) spontaneous

## 2025-02-17 NOTE — ED ADULT NURSE NOTE - OBJECTIVE STATEMENT
65 y/o M, pmh BPH, states he stopped taking medications for it. sent to the ED for elevated BUN and Cr from 2/14, does not know the level. pt endorses flu like symptoms, B/L LE itchiness and abd cramp for 2 weeks. denies cp, sob, hematuria, dysuria, bloody stool. pt A&O4, ambulatory, speech is clear, following commands.

## 2025-02-17 NOTE — ED ADULT TRIAGE NOTE - CHIEF COMPLAINT QUOTE
elevated BUN and CR, sent in by urologist  Sees urologist outpatient for difficulty urinating x a few months  Currently endorsing abdominal cramping x1 week

## 2025-02-17 NOTE — ED ADULT NURSE REASSESSMENT NOTE - NS ED NURSE REASSESS COMMENT FT1
RN spoke with patient again and explained necessity for monitoring on cardiac monitor. Patient verbalized understanding and is now agreeable. PCA obtaining monitor and will place.

## 2025-02-17 NOTE — ED PROVIDER NOTE - NSICDXPASTMEDICALHX_GEN_ALL_CORE_FT
PAST MEDICAL HISTORY:  Acute kidney injury superimposed on CKD     COVID-19 vaccination refused     DVT, lower extremity     Lower extremity edema     Neoplasm of unspecified behavior of bladder     Obstructive uropathy     Refusal of blood product     Retention of urine, unspecified

## 2025-02-18 DIAGNOSIS — Z29.9 ENCOUNTER FOR PROPHYLACTIC MEASURES, UNSPECIFIED: ICD-10-CM

## 2025-02-18 DIAGNOSIS — N40.1 BENIGN PROSTATIC HYPERPLASIA WITH LOWER URINARY TRACT SYMPTOMS: ICD-10-CM

## 2025-02-18 DIAGNOSIS — N13.9 OBSTRUCTIVE AND REFLUX UROPATHY, UNSPECIFIED: ICD-10-CM

## 2025-02-18 DIAGNOSIS — N17.9 ACUTE KIDNEY FAILURE, UNSPECIFIED: ICD-10-CM

## 2025-02-18 DIAGNOSIS — E87.5 HYPERKALEMIA: ICD-10-CM

## 2025-02-18 LAB
ANION GAP SERPL CALC-SCNC: 14 MMOL/L — SIGNIFICANT CHANGE UP (ref 5–17)
ANION GAP SERPL CALC-SCNC: 17 MMOL/L — SIGNIFICANT CHANGE UP (ref 5–17)
APPEARANCE UR: ABNORMAL
BACTERIA # UR AUTO: NEGATIVE /HPF — SIGNIFICANT CHANGE UP
BILIRUB UR-MCNC: NEGATIVE — SIGNIFICANT CHANGE UP
BUN SERPL-MCNC: 65 MG/DL — HIGH (ref 7–23)
BUN SERPL-MCNC: 66 MG/DL — HIGH (ref 7–23)
CALCIUM SERPL-MCNC: 8.7 MG/DL — SIGNIFICANT CHANGE UP (ref 8.4–10.5)
CALCIUM SERPL-MCNC: 9.4 MG/DL — SIGNIFICANT CHANGE UP (ref 8.4–10.5)
CAST: 0 /LPF — SIGNIFICANT CHANGE UP (ref 0–4)
CHLORIDE SERPL-SCNC: 109 MMOL/L — HIGH (ref 96–108)
CHLORIDE SERPL-SCNC: 112 MMOL/L — HIGH (ref 96–108)
CO2 SERPL-SCNC: 14 MMOL/L — LOW (ref 22–31)
CO2 SERPL-SCNC: 15 MMOL/L — LOW (ref 22–31)
COLOR SPEC: ABNORMAL
CREAT ?TM UR-MCNC: 30 MG/DL — SIGNIFICANT CHANGE UP
CREAT SERPL-MCNC: 5.05 MG/DL — HIGH (ref 0.5–1.3)
CREAT SERPL-MCNC: 5.69 MG/DL — HIGH (ref 0.5–1.3)
CULTURE RESULTS: SIGNIFICANT CHANGE UP
DIFF PNL FLD: ABNORMAL
EGFR: 10 ML/MIN/1.73M2 — LOW
EGFR: 12 ML/MIN/1.73M2 — LOW
GLUCOSE SERPL-MCNC: 104 MG/DL — HIGH (ref 70–99)
GLUCOSE SERPL-MCNC: 165 MG/DL — HIGH (ref 70–99)
GLUCOSE UR QL: 100 MG/DL
KETONES UR-MCNC: NEGATIVE MG/DL — SIGNIFICANT CHANGE UP
LEUKOCYTE ESTERASE UR-ACNC: ABNORMAL
MAGNESIUM SERPL-MCNC: 1.8 MG/DL — SIGNIFICANT CHANGE UP (ref 1.6–2.6)
NITRITE UR-MCNC: NEGATIVE — SIGNIFICANT CHANGE UP
OSMOLALITY UR: 309 MOS/KG — SIGNIFICANT CHANGE UP (ref 300–900)
PH UR: 7.5 — SIGNIFICANT CHANGE UP (ref 5–8)
PHOSPHATE SERPL-MCNC: 3.8 MG/DL — SIGNIFICANT CHANGE UP (ref 2.5–4.5)
POTASSIUM SERPL-MCNC: 4.7 MMOL/L — SIGNIFICANT CHANGE UP (ref 3.5–5.3)
POTASSIUM SERPL-MCNC: 5.4 MMOL/L — HIGH (ref 3.5–5.3)
POTASSIUM SERPL-SCNC: 4.7 MMOL/L — SIGNIFICANT CHANGE UP (ref 3.5–5.3)
POTASSIUM SERPL-SCNC: 5.4 MMOL/L — HIGH (ref 3.5–5.3)
POTASSIUM UR-SCNC: 12 MMOL/L — SIGNIFICANT CHANGE UP
PROT ?TM UR-MCNC: 1757 MG/DL — HIGH (ref 0–12)
PROT UR-MCNC: >=1000 MG/DL
PROT/CREAT UR-RTO: 58.6 RATIO — HIGH (ref 0–0.2)
RBC CASTS # UR COMP ASSIST: 799 /HPF — HIGH (ref 0–4)
SODIUM SERPL-SCNC: 140 MMOL/L — SIGNIFICANT CHANGE UP (ref 135–145)
SODIUM SERPL-SCNC: 141 MMOL/L — SIGNIFICANT CHANGE UP (ref 135–145)
SODIUM UR-SCNC: 112 MMOL/L — SIGNIFICANT CHANGE UP
SP GR SPEC: 1.02 — SIGNIFICANT CHANGE UP (ref 1–1.03)
SPECIMEN SOURCE: SIGNIFICANT CHANGE UP
SQUAMOUS # UR AUTO: 5 /HPF — SIGNIFICANT CHANGE UP (ref 0–5)
UROBILINOGEN FLD QL: 0.2 MG/DL — SIGNIFICANT CHANGE UP (ref 0.2–1)
UUN UR-MCNC: 186 MG/DL — SIGNIFICANT CHANGE UP
WBC UR QL: 7 /HPF — HIGH (ref 0–5)

## 2025-02-18 PROCEDURE — 76770 US EXAM ABDO BACK WALL COMP: CPT | Mod: 26

## 2025-02-18 PROCEDURE — 99222 1ST HOSP IP/OBS MODERATE 55: CPT

## 2025-02-18 PROCEDURE — 99223 1ST HOSP IP/OBS HIGH 75: CPT

## 2025-02-18 RX ORDER — SODIUM BICARBONATE 1 MEQ/ML
0.08 SYRINGE (ML) INTRAVENOUS
Qty: 75 | Refills: 0 | Status: DISCONTINUED | OUTPATIENT
Start: 2025-02-18 | End: 2025-02-19

## 2025-02-18 RX ORDER — ALFUZOSIN HYDROCHLORIDE 10 MG/1
1 TABLET, EXTENDED RELEASE ORAL
Refills: 0 | DISCHARGE

## 2025-02-18 RX ORDER — ACETAMINOPHEN 500 MG/5ML
650 LIQUID (ML) ORAL EVERY 6 HOURS
Refills: 0 | Status: DISCONTINUED | OUTPATIENT
Start: 2025-02-18 | End: 2025-02-20

## 2025-02-18 RX ORDER — SODIUM ZIRCONIUM CYCLOSILICATE 5 G/5G
5 POWDER, FOR SUSPENSION ORAL ONCE
Refills: 0 | Status: COMPLETED | OUTPATIENT
Start: 2025-02-18 | End: 2025-02-18

## 2025-02-18 RX ORDER — INFLUENZA A VIRUS A/IDAHO/07/2018 (H1N1) ANTIGEN (MDCK CELL DERIVED, PROPIOLACTONE INACTIVATED, INFLUENZA A VIRUS A/INDIANA/08/2018 (H3N2) ANTIGEN (MDCK CELL DERIVED, PROPIOLACTONE INACTIVATED), INFLUENZA B VIRUS B/SINGAPORE/INFTT-16-0610/2016 ANTIGEN (MDCK CELL DERIVED, PROPIOLACTONE INACTIVATED), INFLUENZA B VIRUS B/IOWA/06/2017 ANTIGEN (MDCK CELL DERIVED, PROPIOLACTONE INACTIVATED) 15; 15; 15; 15 UG/.5ML; UG/.5ML; UG/.5ML; UG/.5ML
0.5 INJECTION, SUSPENSION INTRAMUSCULAR ONCE
Refills: 0 | Status: DISCONTINUED | OUTPATIENT
Start: 2025-02-18 | End: 2025-02-20

## 2025-02-18 RX ORDER — TAMSULOSIN HYDROCHLORIDE 0.4 MG/1
0.4 CAPSULE ORAL AT BEDTIME
Refills: 0 | Status: DISCONTINUED | OUTPATIENT
Start: 2025-02-18 | End: 2025-02-20

## 2025-02-18 RX ORDER — POLYETHYLENE GLYCOL 3350 17 G/17G
17 POWDER, FOR SOLUTION ORAL DAILY
Refills: 0 | Status: DISCONTINUED | OUTPATIENT
Start: 2025-02-18 | End: 2025-02-20

## 2025-02-18 RX ADMIN — TAMSULOSIN HYDROCHLORIDE 0.4 MILLIGRAM(S): 0.4 CAPSULE ORAL at 04:45

## 2025-02-18 RX ADMIN — TAMSULOSIN HYDROCHLORIDE 0.4 MILLIGRAM(S): 0.4 CAPSULE ORAL at 21:30

## 2025-02-18 RX ADMIN — SODIUM ZIRCONIUM CYCLOSILICATE 5 GRAM(S): 5 POWDER, FOR SUSPENSION ORAL at 13:03

## 2025-02-18 NOTE — H&P ADULT - ASSESSMENT
64 year old male with hx of BPH, YG on CKD, obstructive uropathy, urinary retention requiring silva and multiple admissions in the past presenting with urinary retention, YG on CKD, and hyperkalemia. Pt admitted for further management.

## 2025-02-18 NOTE — PATIENT PROFILE ADULT - FALL HARM RISK - HARM RISK INTERVENTIONS

## 2025-02-18 NOTE — CHART NOTE - NSCHARTNOTEFT_GEN_A_CORE
Notified by RN, patient refusing new IV access and sodium bicarbonate infusions. Pt see at bedside. Pt states IV site is " leaky and I don't want anymore holes in me "  Risks and benefits of refusing infusion were explained to the patient. Patient verbalized understanding. Pt states will notify team when he is ready for another IV.    Ladonna Villafuerte NP  Department of Medicine   Spectra [phone]

## 2025-02-18 NOTE — H&P ADULT - PROBLEM SELECTOR PLAN 2
YG likely from obstruction. Scr appears to be improving with silva insertion. Baseline SCr 4-5, currently 6.1  - Strict I/Os, monitor urine output  - Continue silva  - Avoid nephrotoxic medications  - Trend BMP, Scr

## 2025-02-18 NOTE — H&P ADULT - PROBLEM/PLAN-2
PSYCHO-ONCOLOGY NOTE/ Individual Psychotherapy     Date: 10/2/2023   Location:  [facility]            Therapeutic Intervention: Met with patient.  Outpatient - Behavior modifying psychotherapy 60 min - CPT code 04487    Chief complaint/reason for encounter: depression; Met with patient to evaluate psychosocial adaptation to survivorship of HSCT  The patient's last visit with me was on 9/11/2023.    Objective:  Suzanne Villeda arrived promptly for the session.  Ms. Villeda was independently ambulatory at the time of session. The patient was fully cooperative throughout the session.  Appearance: age appropriate, appropriately  dressed, well groomed  Behavior/Cooperation: friendly and cooperative  Speech: normal in rate, volume, and tone and appropriate quality, quantity and organization of sentences  Mood:dysthymic, anxious  Affect: euthymic  Thought Process: goal-directed, logical  Thought Content: normal,  No delusions or paranoia; did not appear to be responding to internal stimuli during the session  Orientation: grossly intact  Memory: Grossly intact  Attention Span/Concentration: Attends to session without distraction; reports no difficulty  Fund of Knowledge: average  Estimate of Intelligence: above average from verbal skills and history  Cognition: grossly intact  Insight: patient has awareness of illness; good insight into own behavior and behavior of others  Judgment: the patient's behavior is adequate to circumstances      Interval history and content of current session: Patient discussed relationship functioning.  Had a long conversation with Bahman this weekend about her relationship goals. He is more interested in slow development of the relationship. She is saddened by his comments, but understanding of his position.    Patient recognized distorted thoughts around appearance and recognized YouNight participation as a trigger. Changing thoughts around appearance discussed. Actively challenging  distortions and changing cognitive content re: attractiveness. [facility] recent [facility] experiences.    Prior  Still not taking Ambien; tried trazodone x 1 night (groggy the next day)- plans to try again to see if it persists. ALso did sleep study last night.    History of benefit from Elavil use for sleep.    Risk parameters:   Patient reports no suicidal ideation  Patient reports no homicidal ideation  Patient reports no self-injurious behavior  Patient reports no violent behavior   Safety needs:  None at this time      Verbal deficits: None     Patient's response to intervention:The patient's response to intervention is accepting, motivated.     Progress toward goals and other mental status changes:  The patient's progress toward goals is good.      Progress to date:Progress as Expected      Goals from last visit: Met      Patient reported outcomes:      Distress Thermometer:   Distress Score             Practical Problems Physical Problems                                                   Family Problems                                         Emotional Problems                                                         Spiritual/Religions Concerns               Other Problems              PHQ-9=3     ZULEIMA-7= 2      9/1/2023     9:01 AM 9/1/2023     8:59 AM 7/12/2023     9:57 AM   GAD7   1. Feeling nervous, anxious, or on edge? 1 1 1   2. Not being able to stop or control worrying? 0 0 0   3. Worrying too much about different things? 0 0 0   4. Trouble relaxing? 0 0 0   5. Being so restless that it is hard to sit still? 0 0 0   6. Becoming easily annoyed or irritable? 0 0 0   7. Feeling afraid as if something awful might happen? 0 0 0   ZULEIMA-7 Score 1 1 1      Client Strengths: verbal, intelligent, successful, good social support, good insight, commitment to wellness, strong cultural traditions      Treatment Plan:individual psychotherapy and medication management by  physician  Target symptoms: depression, adjustment  Why chosen therapy is appropriate versus another modality: relevant to diagnosis, patient responds to this modality, evidence based practice  Outcome monitoring methods: self-report, observation, checklist/rating scale  Therapeutic intervention type: behavior modifying psychotherapy, supportive psychotherapy  Prognosis: Good         Behavioral goals:    Exercise: as per PT              Stress management:                Social engagement:    YouNight              Nutrition:              Smoking Cessation:              Therapy:  Catch and challenge distortions about appearance; notice range of body shapes in daily life                                               Return to clinic: 2 weeks     Length of Service (minutes direct face-to-face contact): 60      ICD-10-CM ICD-9-CM   1. Major depressive disorder, recurrent episode, mild  F33.0 296.31   2. Anxiety  F41.9 300.00       Uriel Yuan, PhD  [place] License #[id number]                     DISPLAY PLAN FREE TEXT

## 2025-02-18 NOTE — H&P ADULT - HISTORY OF PRESENT ILLNESS
64 year old male with hx of BPH, YG on CKD, obstructive uropathy, urinary retention requiring silva and multiple admissions in the past presents with urinary retention and abnormal labs (elevated Cr and BUN) on outpatient labs today.  64 year old male with hx of BPH, YG on CKD, obstructive uropathy, urinary retention requiring silva and multiple admissions in the past presents with urinary retention and abnormal labs (elevated Cr and BUN) on outpatient labs today. Pt had been having difficulty urinating with increased lower abdominal pressure and pain. He also felt increased fluid building up in legs. He has had issues with urinary retention in the past and had silva inserted which was later removed. He was voiding independently but always felt incomplete void. Pt is hoping for a urological procedure that would resolve his frequent issues with urinary obstruction. He takes no medications currently. In the ED, silva was placed with drainage of 1.4L of urine. Labs notable for hyperkalemia, improved after insulin, D50, and lokelma. Pt admitted for further management.

## 2025-02-18 NOTE — CONSULT NOTE ADULT - ASSESSMENT
65yo M with BPH, CKD presents with YG on CKD and AUR s/p silva placement. Urology consulted.  Cr downtrended from 6.2->5.7 after catheter placement. hyperkalemia improving.     Recs:  - recommend Flomax 0.4mg qHS and Finasteride 5mg qDay  - Bowel regimen, senna, colace, miralax  - Hydration  - Trend Cr, K  - Follow up urine culture, treat UTI if indicated  - would check RBUS for evaluation of hydronephrosis (has had in the past)  - monitor for post obstructive diuresis  - consider renal eval  - needs outpatient follow up with silva catheter to discuss further management of BPH and retention. Would like to follow up with Dr. Sam Leon  - discussed with Dr. Belcher  - Recall  prn  - Outpatient follow up with Dr. Leon in clinic     [facility]  [address] [place] [address], [place] [address]  [phone]

## 2025-02-18 NOTE — H&P ADULT - NSHPLABSRESULTS_GEN_ALL_CORE
143  |  112[H]  |  68[H]  ----------------------------<  89  5.3   |  15[L]  |  6.10[H]      140  |  113[H]  |  66[H]  ----------------------------<  90  6.2[HH]   |  14[L]  |  6.17[H]    Ca    9.0      2025 22:44  Ca    9.1      2025 19:58    TPro  7.0  /  Alb  3.9  /  TBili  0.1[L]  /  DBili  x   /  AST  <5[L]  /  ALT  9[L]  /  AlkPhos  81                          Urinalysis Basic - ( 2025 23:53 )    Color: Orange / Appearance: Cloudy / S.016 / pH: x  Gluc: x / Ketone: Negative mg/dL  / Bili: Negative / Urobili: 0.2 mg/dL   Blood: x / Protein: >=1000 mg/dL / Nitrite: Negative   Leuk Esterase: Trace / RBC: 799 /HPF / WBC 7 /HPF   Sq Epi: x / Non Sq Epi: 5 /HPF / Bacteria: Negative /HPF                              10.2   6.61  )-----------( 177      ( 2025 19:58 )             31.8     CAPILLARY BLOOD GLUCOSE      POCT Blood Glucose.: 122 mg/dL (2025 01:01)  POCT Blood Glucose.: 136 mg/dL (2025 23:35)    Blood Gas Source Venous: Venous (25 @ 23:40)     143  |  112[H]  |  68[H]  ----------------------------<  89  5.3   |  15[L]  |  6.10[H]      140  |  113[H]  |  66[H]  ----------------------------<  90  6.2[HH]   |  14[L]  |  6.17[H]    Ca    9.0      2025 22:44  Ca    9.1      2025 19:58    TPro  7.0  /  Alb  3.9  /  TBili  0.1[L]  /  DBili  x   /  AST  <5[L]  /  ALT  9[L]  /  AlkPhos  81                    Urinalysis Basic - ( 2025 23:53 )    Color: Orange / Appearance: Cloudy / S.016 / pH: x  Gluc: x / Ketone: Negative mg/dL  / Bili: Negative / Urobili: 0.2 mg/dL   Blood: x / Protein: >=1000 mg/dL / Nitrite: Negative   Leuk Esterase: Trace / RBC: 799 /HPF / WBC 7 /HPF   Sq Epi: x / Non Sq Epi: 5 /HPF / Bacteria: Negative /HPF                          10.2   6.61  )-----------( 177      ( 2025 19:58 )             31.8     CAPILLARY BLOOD GLUCOSE    POCT Blood Glucose.: 122 mg/dL (2025 01:01)  POCT Blood Glucose.: 136 mg/dL (2025 23:35)    Blood Gas Source Venous: Venous (25 @ 23:40)

## 2025-02-18 NOTE — CONSULT NOTE ADULT - ASSESSMENT
64 year old male with hx of BPH, YG on CKD, obstructive uropathy, urinary retention requiring silva and multiple admissions in the past presents with urinary retention and abnormal labs (elevated Cr and BUN) on outpatient labs today. Pt had been having difficulty urinating with increased lower abdominal pressure and pain. He also felt increased fluid building up in legs. He has had issues with urinary retention in the past and had silva inserted which was later removed. He was voiding independently but always felt incomplete void. Pt is hoping for a urological procedure that would resolve his frequent issues with urinary obstruction. He takes no medications currently. In the ED, silva was placed with drainage of 1.4L of urine. Nephrology consulted for acute on CKD.     A/P  Acute on CKD 5  as per chart review scr baseline looks like 4.8-5.1  admitted with scr of 6.17  ?etiology likely obstructive uropathy   s/p silva catheter with >1L drained, continue silva catheter  urology f/u   scr improved to 5.69  check US renal/bladder   He does not wish to do dialysis, no indication of RRT at this time   avoid nephrotoxins   monitor UO and bmp     Acidosis   likely in setting of renal failure  non AG  start 1/2 NS with sodium bicarbonate 75 meq @75 cc/hr   monitor serum co2    Hyperkalemia   s/p hyperkalemia cocktail in ED and lokelma 10g  IVF as above  monitor K     Obstructive uropathy  urology following  continue silva catheter       64 year old male with hx of BPH, YG on CKD, obstructive uropathy, urinary retention requiring silva and multiple admissions in the past presents with urinary retention and abnormal labs (elevated Cr and BUN) on outpatient labs today. Pt had been having difficulty urinating with increased lower abdominal pressure and pain. He also felt increased fluid building up in legs. He has had issues with urinary retention in the past and had silva inserted which was later removed. He was voiding independently but always felt incomplete void. Pt is hoping for a urological procedure that would resolve his frequent issues with urinary obstruction. He takes no medications currently. In the ED, silva was placed with drainage of 1.4L of urine. Nephrology consulted for acute on CKD.     A/P  Acute on CKD 4/5  as per chart review scr baseline looks like 4.8-5.1  admitted with scr of 6.17  ?etiology likely obstructive uropathy   s/p silva catheter with >1L drained, continue silva catheter  urology f/u   scr improved to 5.69  check US renal/bladder   He does not wish to be prepared for dialysis, no indication of RRT at this time   avoid nephrotoxins   monitor UO and bmp     Acidosis   likely in setting of renal failure  non AG  start 1/2 NS with sodium bicarbonate 75 meq @75 cc/hr   monitor serum co2    Hyperkalemia   s/p hyperkalemia cocktail in ED and lokelma 10g  IVF as above  low K diet  monitor K     Obstructive uropathy  urology following  continue silva catheter

## 2025-02-18 NOTE — H&P ADULT - PROBLEM SELECTOR PLAN 3
Likely due to urinary obstruction and CKD  - S/p insulin, D50, lokelma in ED with improvement 6.2 --> 5.3  - Trend BMP, monitor K

## 2025-02-18 NOTE — CONSULT NOTE ADULT - SUBJECTIVE AND OBJECTIVE BOX
HPI: 64 year old male with hx of BPH, YG on CKD, obstructive uropathy, urinary retention requiring silva and multiple admissions in the past presents with urinary retention and abnormal labs (elevated Cr and BUN) on outpatient labs today. Pt had been having difficulty urinating with increased lower abdominal pressure and pain. He also felt increased fluid building up in legs. He has had issues with urinary retention in the past and had silva inserted which was later removed. He was voiding independently but always felt incomplete void. Pt is hoping for a urological procedure that would resolve his frequent issues with urinary obstruction. He takes no medications currently. In the ED, silva was placed with drainage of 1.4L of urine. Labs notable for hyperkalemia, improved after insulin, D50, and lokelma. Pt admitted for further management. Denies fever/chills, nausea/vomiting, flank pain, constipation, or other acute complaints. Reports had some gross hematuria after catheter placed but now more clear.    PAST MEDICAL & SURGICAL HISTORY:  Acute kidney injury superimposed on CKD  Obstructive uropathy  Lower extremity edema  COVID-19 vaccination refused  Neoplasm of unspecified behavior of bladder  Retention of urine, unspecified  Refusal of blood product  DVT, lower extremity    No significant past surgical history    MEDICATIONS  (STANDING):  polyethylene glycol 3350 17 Gram(s) Oral daily  tamsulosin 0.4 milliGRAM(s) Oral at bedtime    MEDICATIONS  (PRN):  acetaminophen     Tablet .. 650 milliGRAM(s) Oral every 6 hours PRN Temp greater or equal to 38C (100.4F), Mild Pain (1 - 3)      FAMILY HISTORY:      Allergies    No Known Allergies    Intolerances        SOCIAL HISTORY:    REVIEW OF SYSTEMS: Otherwise negative as stated in HPI    Physical Exam  Vital signs  T(C): 36.8 (02-18-25 @ 05:42), Max: 37.1 (02-17-25 @ 23:40)  HR: 76 (02-18-25 @ 05:42)  BP: 154/97 (02-18-25 @ 05:42)  SpO2: 97% (02-18-25 @ 05:42)    Output     Gen: NAD  Pulm: No respiratory distress  Abd: soft, nontender, nondistended. no CVAT bl  : silva in place with clear urine draining      LABS:                          10.2   6.61  )-----------( 177      ( 17 Feb 2025 19:58 )             31.8     02-18    141  |  112[H]  |  66[H]  ----------------------------<  104[H]  5.4[H]   |  15[L]  |  5.69[H]    Ca    9.4      18 Feb 2025 07:25  Phos  3.8     02-18  Mg     1.8     02-18    TPro  7.0  /  Alb  3.9  /  TBili  0.1[L]  /  DBili  x   /  AST  <5[L]  /  ALT  9[L]  /  AlkPhos  81  02-17      Urinalysis (02.17.25 @ 23:53)    pH Urine: 7.5   Glucose Qualitative, Urine: 100 mg/dL   Blood, Urine: Large   Color: Orange   Urine Appearance: Cloudy   Bilirubin: Negative   Ketone - Urine: Negative mg/dL   Specific Gravity: 1.016   Protein, Urine: >=1000 mg/dL   Urobilinogen: 0.2 mg/dL   Nitrite: Negative   Leukocyte Esterase Concentration: Trace    Urine Microscopic-Add On (NC) (02.17.25 @ 23:53)    White Blood Cell - Urine: 7 /HPF   Red Blood Cell - Urine: 799 /HPF   Bacteria: Negative /HPF   Cast: 0 /LPF   Epithelial Cells: 5 /HPF      Urine Cx: pending    
[facility]   MD LASHON GARCIA MD MARIA SANTIAGO, NP        TEL:  OFFICE: [phone]  From 5pm-7am answering service [phone]    --- INITIAL RENAL CONSULT NOTE ---date of service 02-18-25 @ 16:21    HPI:  64 year old male with hx of BPH, YG on CKD, obstructive uropathy, urinary retention requiring silva and multiple admissions in the past presents with urinary retention and abnormal labs (elevated Cr and BUN) on outpatient labs today. Pt had been having difficulty urinating with increased lower abdominal pressure and pain. He also felt increased fluid building up in legs. He has had issues with urinary retention in the past and had silva inserted which was later removed. He was voiding independently but always felt incomplete void. Pt is hoping for a urological procedure that would resolve his frequent issues with urinary obstruction. He takes no medications currently. In the ED, silva was placed with drainage of 1.4L of urine. Nephrology consulted for acute on CKD.         Allergies:  No Known Allergies      PAST MEDICAL & SURGICAL HISTORY:  Acute kidney injury superimposed on CKD      Obstructive uropathy      Lower extremity edema      COVID-19 vaccination refused      Neoplasm of unspecified behavior of bladder      Retention of urine, unspecified      Refusal of blood product      DVT, lower extremity      No significant past surgical history          Home Medications Reviewed    Hospital Medications:   MEDICATIONS  (STANDING):  polyethylene glycol 3350 17 Gram(s) Oral daily  tamsulosin 0.4 milliGRAM(s) Oral at bedtime      SOCIAL HISTORY:  Denies ETOh, Smoking,     FAMILY HISTORY:      REVIEW OF SYSTEMS:  CONSTITUTIONAL: No weakness, fevers or chills  EYES/ENT: No visual changes;  No vertigo or throat pain   NECK: No pain or stiffness  RESPIRATORY: No cough, wheezing, hemoptysis; No shortness of breath  CARDIOVASCULAR: No chest pain or palpitations.  GASTROINTESTINAL: No abdominal or epigastric pain. No nausea, vomiting, or hematemesis; No diarrhea or constipation. No melena or hematochezia.  GENITOURINARY: as per hpi   NEUROLOGICAL: No numbness or weakness  SKIN: No itching, burning, rashes, or lesions   VASCULAR: No bilateral lower extremity edema.   All other review of systems is negative unless indicated above.    VITALS:  T(F): 98.1 (02-18-25 @ 12:22), Max: 98.7 (02-17-25 @ 23:40)  HR: 95 (02-18-25 @ 12:22)  BP: 106/70 (02-18-25 @ 12:22)  RR: 18 (02-18-25 @ 12:22)  SpO2: 99% (02-18-25 @ 12:22)  Wt(kg): --    02-18 @ 07:01  -  02-18 @ 16:21  --------------------------------------------------------  IN: 240 mL / OUT: 0 mL / NET: 240 mL      Height (cm): 182.9 (02-17 @ 17:31)  Weight (kg): 68 (02-17 @ 17:31)  BMI (kg/m2): 20.3 (02-17 @ 17:31)  BSA (m2): 1.88 (02-17 @ 17:31)    PHYSICAL EXAM:  General: NAD  HEENT: anicteric sclera, oropharynx clear, MMM  Neck: No JVD  Respiratory: CTAB, no wheezes, rales or rhonchi  Cardiovascular: S1, S2, RRR  Gastrointestinal: BS+, soft, NT/ND  Extremities: No cyanosis or clubbing. No peripheral edema  Neurological: A/O x 3, no focal deficits  Psychiatric: Normal mood, normal affect  : +silva   Skin: No rashes  Vascular Access: none    LABS:  02-18    141  |  112[H]  |  66[H]  ----------------------------<  104[H]  5.4[H]   |  15[L]  |  5.69[H]    Ca    9.4      18 Feb 2025 07:25  Phos  3.8     02-18  Mg     1.8     02-18    TPro  7.0  /  Alb  3.9  /  TBili  0.1[L]  /  DBili      /  AST  <5[L]  /  ALT  9[L]  /  AlkPhos  81  02-17    Creatinine Trend: 5.69 <--, 6.10 <--, 6.17 <--                        10.2   6.61  )-----------( 177      ( 17 Feb 2025 19:58 )             31.8     Urine Studies:  Urinalysis Basic - ( 18 Feb 2025 07:25 )    Color:  / Appearance:  / SG:  / pH:   Gluc: 104 mg/dL / Ketone:   / Bili:  / Urobili:    Blood:  / Protein:  / Nitrite:    Leuk Esterase:  / RBC:  / WBC    Sq Epi:  / Non Sq Epi:  / Bacteria:       Sodium, Random Urine: 112 mmol/L (02-17 @ 23:53)  Creatinine, Random Urine: 30 mg/dL (02-17 @ 23:53)  Protein/Creatinine Ratio Calculation: 58.6 Ratio (02-17 @ 23:53)  Osmolality, Random Urine: 309 mos/kg (02-17 @ 23:53)  Potassium, Random Urine: 12 mmol/L (02-17 @ 23:53)      RADIOLOGY & ADDITIONAL STUDIES:

## 2025-02-18 NOTE — H&P ADULT - NSHPPHYSICALEXAM_GEN_ALL_CORE
Vital Signs Last 24 Hrs  T(C): 36.6 (18 Feb 2025 03:00), Max: 37.1 (17 Feb 2025 23:40)  T(F): 97.8 (18 Feb 2025 03:00), Max: 98.7 (17 Feb 2025 23:40)  HR: 87 (18 Feb 2025 03:00) (72 - 87)  BP: 140/87 (18 Feb 2025 03:00) (134/86 - 144/72)  BP(mean): --  RR: 18 (18 Feb 2025 03:00) (15 - 18)  SpO2: 97% (18 Feb 2025 03:00) (97% - 99%)    Parameters below as of 18 Feb 2025 03:00  Patient On (Oxygen Delivery Method): room air        CONSTITUTIONAL: Well-groomed, in no apparent distress  EYES: No conjunctival or scleral injection, non-icteric; PERRLA and symmetric  ENMT: No external nasal lesions; nasal mucosa not inflamed; oral mucosa with moist membranes  RESPIRATORY: Breathing comfortably; lungs CTA without wheeze/rhonchi/rales  CARDIOVASCULAR: +S1S2, RRR, no M/G/R; no lower extremity edema  GASTROINTESTINAL: No palpable masses or tenderness, +BS throughout, no rebound/guarding  GENITOURINARY: silva in place draining blood tinged urine  MUSCULOSKELETAL: No digital clubbing or cyanosis; no paraspinal tenderness; no malalignment of extremities  SKIN: No rashes or ulcers noted; no subcutaneous nodules or induration palpable  NEUROLOGIC: CN grossly intact; sensation intact in LEs b/l to light touch; normal strength and tone  PSYCHIATRIC: A+O x 3; mood and affect appropriate; appropriate insight and judgment

## 2025-02-18 NOTE — H&P ADULT - PROBLEM SELECTOR PLAN 1
Urinary retention likely due to BPH. Prior episodes of urinary retention requiring silva.  - Silva placed in ED, continue for now  - Monitor urine output  - Blood tinged urine likely from traumatic insertion of silva, no clots currently  - Urology consult in AM  - Start tamsulosin 0.4mg qd

## 2025-02-18 NOTE — H&P ADULT - NSHPREVIEWOFSYSTEMS_GEN_ALL_CORE
Review of Systems:   CONSTITUTIONAL: No fever, weight loss  EYES: No eye pain, visual disturbances, or discharge  ENMT:  No difficulty hearing, tinnitus, vertigo; No sinus or throat pain  RESPIRATORY: No SOB. No cough, wheezing, chills or hemoptysis  CARDIOVASCULAR: No chest pain, palpitations, dizziness, or leg swelling  GASTROINTESTINAL: No abdominal or epigastric pain. No nausea, vomiting, or hematemesis; No diarrhea or constipation. No melena or hematochezia.  GENITOURINARY: +urinary retention, No dysuria, frequency, hematuria, or incontinence  NEUROLOGICAL: No headaches, memory loss, loss of strength, numbness, or tremors  SKIN: No itching, burning, rashes, or lesions   MUSCULOSKELETAL: No joint pain or swelling; No muscle, back pain  HEME/LYMPH: No easy bruising, or bleeding gums

## 2025-02-19 ENCOUNTER — TRANSCRIPTION ENCOUNTER (OUTPATIENT)
Age: 65
End: 2025-02-19

## 2025-02-19 LAB
ANION GAP SERPL CALC-SCNC: 17 MMOL/L — SIGNIFICANT CHANGE UP (ref 5–17)
BUN SERPL-MCNC: 64 MG/DL — HIGH (ref 7–23)
CALCIUM SERPL-MCNC: 8.8 MG/DL — SIGNIFICANT CHANGE UP (ref 8.4–10.5)
CHLORIDE SERPL-SCNC: 112 MMOL/L — HIGH (ref 96–108)
CO2 SERPL-SCNC: 14 MMOL/L — LOW (ref 22–31)
CREAT SERPL-MCNC: 4.81 MG/DL — HIGH (ref 0.5–1.3)
EGFR: 13 ML/MIN/1.73M2 — LOW
GLUCOSE SERPL-MCNC: 89 MG/DL — SIGNIFICANT CHANGE UP (ref 70–99)
POTASSIUM SERPL-MCNC: 4.9 MMOL/L — SIGNIFICANT CHANGE UP (ref 3.5–5.3)
POTASSIUM SERPL-SCNC: 4.9 MMOL/L — SIGNIFICANT CHANGE UP (ref 3.5–5.3)
SODIUM SERPL-SCNC: 143 MMOL/L — SIGNIFICANT CHANGE UP (ref 135–145)

## 2025-02-19 RX ORDER — SODIUM BICARBONATE 1 MEQ/ML
0.21 SYRINGE (ML) INTRAVENOUS
Qty: 150 | Refills: 0 | Status: DISCONTINUED | OUTPATIENT
Start: 2025-02-19 | End: 2025-02-20

## 2025-02-19 RX ORDER — POLYETHYLENE GLYCOL 3350 17 G/17G
17 POWDER, FOR SOLUTION ORAL
Qty: 1 | Refills: 0
Start: 2025-02-19 | End: 2025-03-20

## 2025-02-19 RX ORDER — SENNA 187 MG
2 TABLET ORAL AT BEDTIME
Refills: 0 | Status: DISCONTINUED | OUTPATIENT
Start: 2025-02-19 | End: 2025-02-20

## 2025-02-19 RX ORDER — SODIUM BICARBONATE 1 MEQ/ML
1300 SYRINGE (ML) INTRAVENOUS THREE TIMES A DAY
Refills: 0 | Status: DISCONTINUED | OUTPATIENT
Start: 2025-02-19 | End: 2025-02-20

## 2025-02-19 RX ORDER — TAMSULOSIN HYDROCHLORIDE 0.4 MG/1
1 CAPSULE ORAL
Qty: 30 | Refills: 0
Start: 2025-02-19 | End: 2025-03-20

## 2025-02-19 RX ORDER — SODIUM BICARBONATE 1 MEQ/ML
2 SYRINGE (ML) INTRAVENOUS
Qty: 180 | Refills: 0
Start: 2025-02-19 | End: 2025-03-20

## 2025-02-19 RX ADMIN — Medication 1300 MILLIGRAM(S): at 14:11

## 2025-02-19 RX ADMIN — Medication 100 MEQ/KG/HR: at 13:08

## 2025-02-19 RX ADMIN — TAMSULOSIN HYDROCHLORIDE 0.4 MILLIGRAM(S): 0.4 CAPSULE ORAL at 14:14

## 2025-02-19 RX ADMIN — Medication 2 TABLET(S): at 21:17

## 2025-02-19 RX ADMIN — Medication 75 MEQ/KG/HR: at 06:19

## 2025-02-19 RX ADMIN — Medication 1300 MILLIGRAM(S): at 21:17

## 2025-02-19 NOTE — DISCHARGE NOTE PROVIDER - NSDCFUADDAPPT_GEN_ALL_CORE_FT
APPTS ARE READY TO BE MADE: [X] YES    Best Family or Patient Contact (if needed):    Additional Information about above appointments (if needed):    1: Renal in 1 week   2:   3:     Other comments or requests:    APPTS ARE READY TO BE MADE: [X] YES    Best Family or Patient Contact (if needed):    Additional Information about above appointments (if needed):    1: Renal in 1 week   2:   3:     Other comments or requests:         Prior to outreaching the patient, it was visible that the patient has secured a follow up appointment for Internal Medicine with Dr. Rosado on 3/3/2025 at 2pm, [id number] [facility]  [id number], which was not scheduled by our team.    Prior to outreaching the patient, it was visible that the patient has secured a follow up appointment for Urology with Dr. Hoenig on 3/5/2025 at 8am, [address] [facility]  [id number], which was not scheduled by our team.    Prior to outreaching the patient, it was visible that the patient has secured a follow up appointment for Nephrology with Dr. Michele on 3/18/2025 at 1:40pm, [address] [facility]  [id number], which was not scheduled by our team.

## 2025-02-19 NOTE — PROGRESS NOTE ADULT - ASSESSMENT
64 year old male with hx of BPH, YG on CKD, obstructive uropathy, urinary retention requiring silva and multiple admissions in the past presents with urinary retention and abnormal labs (elevated Cr and BUN) on outpatient labs today. Pt had been having difficulty urinating with increased lower abdominal pressure and pain. He also felt increased fluid building up in legs. He has had issues with urinary retention in the past and had silva inserted which was later removed. He was voiding independently but always felt incomplete void. Pt is hoping for a urological procedure that would resolve his frequent issues with urinary obstruction. He takes no medications currently. In the ED, silva was placed with drainage of 1.4L of urine. Nephrology consulted for acute on CKD.     A/P  Acute on CKD 4/5  as per chart review scr baseline looks like 4.8-5.1  admitted with scr of 6.17  ?etiology likely obstructive uropathy   s/p silva catheter with >1L drained, continue silva catheter  urology f/u   scr improving  check US renal/bladder   He does not wish to be prepared for dialysis, no indication of RRT at this time   avoid nephrotoxins   monitor UO and bmp   PT insisting on going home. recommend oral bicarb 1300mg TID , and needs to go home with silva   outpt fu in 1 week    Acidosis   likely in setting of renal failure  non AG  change to 150meq of bicarb if not discharged  monitor serum co2    Hyperkalemia   s/p hyperkalemia cocktail in ED and lokelma 10g  IVF as above  low K diet  monitor K     Obstructive uropathy  urology following  continue silva catheter

## 2025-02-19 NOTE — DISCHARGE NOTE PROVIDER - PROVIDER TOKENS
PROVIDER:[TOKEN:[[id number]],FOLLOWUP:[1 week]],PROVIDER:[TOKEN:[[id number]],FOLLOWUP:[2 weeks]],PROVIDER:[TOKEN:[[id number]],FOLLOWUP:[1 week]] PROVIDER:[TOKEN:[[id number]],FOLLOWUP:[1-3 days]],PROVIDER:[TOKEN:[[id number]],FOLLOWUP:[1 week]],FREE:[LAST:[Grotas],FIRST:[Duncan],PHONE:[(   )    -],FAX:[(   )    -],ESTABLISHEDPATIENT:[T]]

## 2025-02-19 NOTE — DISCHARGE NOTE PROVIDER - CARE PROVIDERS DIRECT ADDRESSES
,[email or web address],DirectAddress_Unknown

## 2025-02-19 NOTE — PROGRESS NOTE ADULT - ASSESSMENT
64 year old male with hx of BPH, YG on CKD, obstructive uropathy, urinary retention requiring silva and multiple admissions in the past presenting with urinary retention, YG on CKD, and hyperkalemia. Pt admitted for further management.         Problem/Plan - 1:  ·  Problem: Benign prostatic hyperplasia with urinary retention.   ·  Plan: Urinary retention likely due to BPH. Prior episodes of urinary retention requiring silva.  - Silva placed in ED, continue for now  - Monitor urine output  - Blood tinged urine likely from traumatic insertion of silva, no clots currently  - Urology consult in AM  - Start tamsulosin 0.4mg qd.    Problem/Plan - 2:  ·  Problem: Acute kidney injury superimposed on CKD.   ·  Plan: YG likely from obstruction. Scr appears to be improving with silva insertion. Baseline SCr 4-5, currently 6.1  - Strict I/Os, monitor urine output  - pt wants TOV today   Problem/Plan - 3:  ·  Problem: Hyperkalemia.   ·  Plan: Likely due to urinary obstruction and CKD  - S/p insulin, D50, lokelma in ED with improvement 6.2 --> 5.3  - Trend BMP, monitor K.    Problem/Plan - 4:  ·  Problem: Prophylactic measure.   ·  Plan: DVT ppx - low VTE risk  Diet - regular  Dispo - pending.    dc planning

## 2025-02-19 NOTE — PROGRESS NOTE ADULT - SUBJECTIVE AND OBJECTIVE BOX
[facility]   MD LASHON GARCIA MD RUORU WONG, PA    TEL:  FROM 9 AM to 5 PM ---OFFICE: [phone]  AVAILABLE ON TEAMS    FROM 5 PM - 9 AM PLEASE CALL ANSWERING SERVICE: [phone]    RENAL FOLLOW UP NOTE--Date of Service 02-19-25 @ 10:40  --------------------------------------------------------------------------------  HPI:      Pt seen and examined at bedside.   Srinath SOB, chest pain     PAST HISTORY  --------------------------------------------------------------------------------  No significant changes to PMH, PSH, FHx, SHx, unless otherwise noted    ALLERGIES & MEDICATIONS  --------------------------------------------------------------------------------  Allergies    No Known Allergies    Intolerances      Standing Inpatient Medications  influenza  Vaccine (HIGH DOSE) 0.5 milliLiter(s) IntraMuscular once  polyethylene glycol 3350 17 Gram(s) Oral daily  sodium bicarbonate 1300 milliGRAM(s) Oral three times a day  sodium bicarbonate  Infusion 0.083 mEq/kG/Hr IV Continuous <Continuous>  tamsulosin 0.4 milliGRAM(s) Oral at bedtime    PRN Inpatient Medications  acetaminophen     Tablet .. 650 milliGRAM(s) Oral every 6 hours PRN      REVIEW OF SYSTEMS  --------------------------------------------------------------------------------  General: no fever    MSK: no edema     VITALS/PHYSICAL EXAM  --------------------------------------------------------------------------------  T(C): 36.6 (02-19-25 @ 05:00), Max: 37.1 (02-18-25 @ 20:31)  HR: 83 (02-19-25 @ 05:00) (83 - 100)  BP: 136/82 (02-19-25 @ 05:00) (100/68 - 140/80)  RR: 18 (02-19-25 @ 05:00) (18 - 18)  SpO2: 100% (02-19-25 @ 05:00) (97% - 100%)  Wt(kg): --  Height (cm): 182.9 (02-18-25 @ 18:06)  Weight (kg): 70.307 (02-18-25 @ 18:06)  BMI (kg/m2): 21 (02-18-25 @ 18:06)  BSA (m2): 1.91 (02-18-25 @ 18:06)      02-18-25 @ 07:01  -  02-19-25 @ 07:00  --------------------------------------------------------  IN: 840 mL / OUT: 600 mL / NET: 240 mL      Physical Exam:  	Gen: NAD  	HEENT: MMM  	Pulm: CTA B/L  	CV: S1S2  	Abd: Soft, +BS  	Ext: No LE edema B/L                      Neuro: Awake   	Skin: Warm and Dry   	Vascular access: NO HD catheter           VANGIE silva  LABS/STUDIES  --------------------------------------------------------------------------------              10.2   6.61  >-----------<  177      [02-17-25 @ 19:58]              31.8     143  |  112  |  64  ----------------------------<  89      [02-19-25 @ 06:55]  4.9   |  14  |  4.81        Ca     8.8     [02-19-25 @ 06:55]      Mg     1.8     [02-18-25 @ 07:25]      Phos  3.8     [02-18-25 @ 07:25]    TPro  7.0  /  Alb  3.9  /  TBili  0.1  /  DBili  x   /  AST  <5  /  ALT  9   /  AlkPhos  81  [02-17-25 @ 19:58]          Creatinine Trend:  SCr 4.81 [02-19 @ 06:55]  SCr 5.05 [02-18 @ 20:36]  SCr 5.69 [02-18 @ 07:25]  SCr 6.10 [02-17 @ 22:44]  SCr 6.17 [02-17 @ 19:58]    Urinalysis - [02-19-25 @ 06:55]      Color  / Appearance  / SG  / pH       Gluc 89 / Ketone   / Bili  / Urobili        Blood  / Protein  / Leuk Est  / Nitrite       RBC  / WBC  / Hyaline  / Gran  / Sq Epi  / Non Sq Epi  / Bacteria     Urine Creatinine 30      [02-17-25 @ 23:53]  Urine Protein 1757      [02-17-25 @ 23:53]  Urine Sodium 112      [02-17-25 @ 23:53]  Urine Urea Nitrogen 186      [02-17-25 @ 23:53]  Urine Potassium 12      [02-17-25 @ 23:53]  Urine Osmolality 309      [02-17-25 @ 23:53]    Iron 53, TIBC 363, %sat 15      [10-04-24 @ 06:11]  Ferritin 319      [10-04-24 @ 06:11]

## 2025-02-19 NOTE — DISCHARGE NOTE PROVIDER - NSDCFUSCHEDAPPT_GEN_ALL_CORE_FT
Swapnil Michele  [facility] Physician [facility]  NEPHRO [address]  Scheduled Appointment: 03/18/2025

## 2025-02-19 NOTE — DISCHARGE NOTE PROVIDER - CARE PROVIDER_API CALL
Joel Zhong  Nephrology  [address] [place] [address]  Phone: [phone]  Fax: [phone]  Follow Up Time: 1 week    Jordin Barone  Internal Medicine   [address]  [place] [address]  Phone: [phone]  Fax: [phone]  Follow Up Time: 2 weeks    Christian Rosado (DO)  Family Medicine  [address], [address]  [place] [address]  Phone: [phone]  Fax: [phone]  Follow Up Time: 1 week   Joel Zhong  Nephrology  [address] [place] [address]  Phone: [phone]  Fax: [phone]  Follow Up Time: 1-3 days    Christian Rosado (DO)  Family Medicine  [address] [place] [address], [address]  [place] [address]  Phone: [phone]  Fax: [phone]  Follow Up Time: 1 week    Duncan Garcia  Phone: (   )    -  Fax: (   )    -  Established Patient  Follow Up Time:

## 2025-02-19 NOTE — DISCHARGE NOTE PROVIDER - NSDCMRMEDTOKEN_GEN_ALL_CORE_FT
polyethylene glycol 3350 oral powder for reconstitution: 17 gram(s) orally once a day  sodium bicarbonate 650 mg oral tablet: 2 tab(s) orally 3 times a day  tamsulosin 0.4 mg oral capsule: 1 cap(s) orally once a day (at bedtime)

## 2025-02-19 NOTE — DISCHARGE NOTE PROVIDER - HOSPITAL COURSE
HPI:  64 year old male with hx of BPH, YG on CKD, obstructive uropathy, urinary retention requiring silva and multiple admissions in the past presents with urinary retention and abnormal labs (elevated Cr and BUN) on outpatient labs today. Pt had been having difficulty urinating with increased lower abdominal pressure and pain. He also felt increased fluid building up in legs. He has had issues with urinary retention in the past and had silva inserted which was later removed. He was voiding independently but always felt incomplete void. Pt is hoping for a urological procedure that would resolve his frequent issues with urinary obstruction. He takes no medications currently. In the ED, silva was placed with drainage of 1.4L of urine. Labs notable for hyperkalemia, improved after insulin, D50, and lokelma. Pt admitted for further management.  (18 Feb 2025 02:40)    Hospital Course: A 64-year-old male with a history of BPH, CKD, obstructive uropathy, and recurrent urinary retention presented with urinary retention, elevated creatinine and BUN, lower abdominal pain and pressure, and leg edema. He received a silva catheter in the ED, draining 1.4L of urine, and nephrology was consulted for acute-on-chronic kidney injury, likely due to obstructive uropathy. He was treated for hyperkalemia and metabolic acidosis. While the patient desired a urological procedure and declined dialysis preparation, he insisted on discharge with a silva catheter. He was prescribed oral bicarbonate and instructed to follow up with urology and nephrology as an outpatient.      Important Medication Changes and Reason:    Active or Pending Issues Requiring Follow-up:    Advanced Directives:   [ ] Full code  [ ] DNR  [ ] Hospice    Discharge Diagnoses:               HPI:  64 year old male with hx of BPH, YG on CKD, obstructive uropathy, urinary retention requiring silva and multiple admissions in the past presents with urinary retention and abnormal labs (elevated Cr and BUN) on outpatient labs today. Pt had been having difficulty urinating with increased lower abdominal pressure and pain. He also felt increased fluid building up in legs. He has had issues with urinary retention in the past and had silva inserted which was later removed. He was voiding independently but always felt incomplete void. Pt is hoping for a urological procedure that would resolve his frequent issues with urinary obstruction. He takes no medications currently. In the ED, silva was placed with drainage of 1.4L of urine. Labs notable for hyperkalemia, improved after insulin, D50, and lokelma. Pt admitted for further management.      Hospital Course: A 64-year-old male with a history of BPH, CKD, obstructive uropathy, and recurrent urinary retention presented with urinary retention, elevated creatinine and BUN, lower abdominal pain and pressure, and leg edema. He received a Silva catheter in the ED, draining 1.4L of urine, and nephrology was consulted for acute-on-chronic kidney injury, likely due to obstructive uropathy. He was treated for hyperkalemia and metabolic acidosis. While the patient desired a urological procedure and declined dialysis preparation, he insisted on discharge with a Silva catheter. He was prescribed oral bicarbonate and instructed to follow up with urology and nephrology as an outpatient.      Important Medication Changes and Reason:     Active or Pending Issues Requiring Follow-up:    Advanced Directives:   [x] Full code  [ ] DNR  [ ] Hospice    Discharge Diagnoses:               HPI:  64 year old male with hx of BPH, YG on CKD, obstructive uropathy, urinary retention requiring silva and multiple admissions in the past presents with urinary retention and abnormal labs (elevated Cr and BUN) on outpatient labs today. Pt had been having difficulty urinating with increased lower abdominal pressure and pain. He also felt increased fluid building up in legs. He has had issues with urinary retention in the past and had silva inserted which was later removed. He was voiding independently but always felt incomplete void. Pt is hoping for a urological procedure that would resolve his frequent issues with urinary obstruction. He takes no medications currently. In the ED, silva was placed with drainage of 1.4L of urine. Labs notable for hyperkalemia, improved after insulin, D50, and lokelma. Pt admitted for further management.      Hospital Course: A 64-year-old male with a history of BPH, CKD, obstructive uropathy, and recurrent urinary retention presented with urinary retention, elevated creatinine and BUN, lower abdominal pain and pressure, and leg edema. He received a Silva catheter in the ED, draining 1.4L of urine, and nephrology was consulted for acute-on-chronic kidney injury, likely due to obstructive uropathy. He was treated for hyperkalemia and metabolic acidosis. While the patient desired a urological procedure and declined dialysis preparation, he insisted on discharge without  a Silva catheter. He was prescribed oral bicarbonate and instructed to follow up with urology and nephrology as an outpatient.      Important Medication Changes and Reason:     Active or Pending Issues Requiring Follow-up:    Advanced Directives:   [x] Full code  [ ] DNR  [ ] Hospice    Discharge Diagnoses:               HPI:  64 year old male with hx of BPH, YG on CKD, obstructive uropathy, urinary retention requiring silva and multiple admissions in the past presents with urinary retention and abnormal labs (elevated Cr and BUN) on outpatient labs today. Pt had been having difficulty urinating with increased lower abdominal pressure and pain. He also felt increased fluid building up in legs. He has had issues with urinary retention in the past and had silva inserted which was later removed. He was voiding independently but always felt incomplete void. Pt is hoping for a urological procedure that would resolve his frequent issues with urinary obstruction. He takes no medications currently. In the ED, silva was placed with drainage of 1.4L of urine. Labs notable for hyperkalemia, improved after insulin, D50, and lokelma. Pt admitted for further management.      Hospital Course: A 64-year-old male with a history of BPH, CKD, obstructive uropathy, and recurrent urinary retention presented with urinary retention, elevated creatinine and BUN, lower abdominal pain and pressure, and leg edema. He received a Silva catheter in the ED, draining 1.4L of urine, and nephrology was consulted for acute-on-chronic kidney injury, likely due to obstructive uropathy. He was treated for hyperkalemia and metabolic acidosis. While the patient desired a urological procedure and declined dialysis preparation, he insisted on discharge without  a Silva catheter. He was prescribed oral bicarbonate and instructed to follow up with urology and nephrology as an outpatient.  Notified Medical Attending Dr Barone of pt refusing to have Silva Catheter reinserted, and of discussion by Urology Team and myself that he needs to have Silva reinserted.   Dr Barone recommended that if pt leaves hospital without Silva Catheter it will be AMA. Pt verbalized understanding of AMA, and declines to sign AMA Form.     Important Medication Changes and Reason:     Active or Pending Issues Requiring Follow-up:    Advanced Directives:   [x] Full code  [ ] DNR  [ ] Hospice    Discharge Diagnoses:

## 2025-02-19 NOTE — DISCHARGE NOTE PROVIDER - NSDCCPCAREPLAN_GEN_ALL_CORE_FT
PRINCIPAL DISCHARGE DIAGNOSIS  Diagnosis: Obstructive uropathy  Assessment and Plan of Treatment: Likely related to BPH   Continue Flomax  Pratt dc per patient      SECONDARY DISCHARGE DIAGNOSES  Diagnosis: Acute kidney injury superimposed on CKD  Assessment and Plan of Treatment: Follow up with Renal in 1 week   Metabolic acidosis - Bicarb po     PRINCIPAL DISCHARGE DIAGNOSIS  Diagnosis: Obstructive uropathy  Assessment and Plan of Treatment: Likely related to BPH   Continue Flomax.  Pratt dc per patient's request.      SECONDARY DISCHARGE DIAGNOSES  Diagnosis: Benign prostatic hyperplasia with urinary retention  Assessment and Plan of Treatment: Patient had Pratt catheter discontinued. He is unable to void and has declined to have Pratt catheter replaced.   Urology Team called. Pt still declines Pratt catheter reinsertion.  Follow up with Urology in 1 day.    Diagnosis: Acute kidney injury superimposed on CKD  Assessment and Plan of Treatment:   Metabolic acidosis - Continue Sodium Bicarb oral.   Follow up with Nephrologist in 1 week.

## 2025-02-19 NOTE — PROGRESS NOTE ADULT - SUBJECTIVE AND OBJECTIVE BOX
Patient is a 64y old  Male who presents with a chief complaint of YG on CKD  (19 Feb 2025 10:53)    Date of servie : 02-20-25 @ 12:32  INTERVAL HPI/OVERNIGHT EVENTS:  T(C): 37.1 (02-20-25 @ 12:22), Max: 37.1 (02-20-25 @ 12:22)  HR: 95 (02-20-25 @ 12:22) (78 - 95)  BP: 97/66 (02-20-25 @ 12:22) (97/66 - 144/67)  RR: 18 (02-20-25 @ 12:22) (18 - 18)  SpO2: 99% (02-20-25 @ 12:22) (97% - 100%)  Wt(kg): --  I&O's Summary    19 Feb 2025 07:01  -  20 Feb 2025 07:00  --------------------------------------------------------  IN: 2300 mL / OUT: 700 mL / NET: 1600 mL        LABS:    02-20    140  |  104  |  66[H]  ----------------------------<  128[H]  4.1   |  19[L]  |  4.75[H]    Ca    8.6      20 Feb 2025 10:56        Urinalysis Basic - ( 20 Feb 2025 10:56 )    Color: x / Appearance: x / SG: x / pH: x  Gluc: 128 mg/dL / Ketone: x  / Bili: x / Urobili: x   Blood: x / Protein: x / Nitrite: x   Leuk Esterase: x / RBC: x / WBC x   Sq Epi: x / Non Sq Epi: x / Bacteria: x      CAPILLARY BLOOD GLUCOSE            Urinalysis Basic - ( 20 Feb 2025 10:56 )    Color: x / Appearance: x / SG: x / pH: x  Gluc: 128 mg/dL / Ketone: x  / Bili: x / Urobili: x   Blood: x / Protein: x / Nitrite: x   Leuk Esterase: x / RBC: x / WBC x   Sq Epi: x / Non Sq Epi: x / Bacteria: x        MEDICATIONS  (STANDING):  influenza  Vaccine (HIGH DOSE) 0.5 milliLiter(s) IntraMuscular once  polyethylene glycol 3350 17 Gram(s) Oral daily  senna 2 Tablet(s) Oral at bedtime  sodium bicarbonate 1300 milliGRAM(s) Oral three times a day  tamsulosin 0.4 milliGRAM(s) Oral at bedtime    MEDICATIONS  (PRN):  acetaminophen     Tablet .. 650 milliGRAM(s) Oral every 6 hours PRN Temp greater or equal to 38C (100.4F), Mild Pain (1 - 3)          PHYSICAL EXAM:  GENERAL: NAD, well-groomed, well-developed  HEAD:  Atraumatic, Normocephalic  CHEST/LUNG: Clear to percussion bilaterally; No rales, rhonchi, wheezing, or rubs  HEART: Regular rate and rhythm; No murmurs, rubs, or gallops  ABDOMEN: Soft, Nontender, Nondistended; Bowel sounds present  EXTREMITIES:  2+ Peripheral Pulses, No clubbing, cyanosis, or edema  LYMPH: No lymphadenopathy noted  SKIN: No rashes or lesions    Care Discussed with Consultants/Other Providers [ ] YES  [ ] NO

## 2025-02-20 ENCOUNTER — TRANSCRIPTION ENCOUNTER (OUTPATIENT)
Age: 65
End: 2025-02-20

## 2025-02-20 VITALS
SYSTOLIC BLOOD PRESSURE: 97 MMHG | HEART RATE: 95 BPM | TEMPERATURE: 99 F | RESPIRATION RATE: 18 BRPM | OXYGEN SATURATION: 99 % | DIASTOLIC BLOOD PRESSURE: 66 MMHG

## 2025-02-20 LAB
ANION GAP SERPL CALC-SCNC: 17 MMOL/L — SIGNIFICANT CHANGE UP (ref 5–17)
BUN SERPL-MCNC: 66 MG/DL — HIGH (ref 7–23)
CALCIUM SERPL-MCNC: 8.6 MG/DL — SIGNIFICANT CHANGE UP (ref 8.4–10.5)
CHLORIDE SERPL-SCNC: 104 MMOL/L — SIGNIFICANT CHANGE UP (ref 96–108)
CO2 SERPL-SCNC: 19 MMOL/L — LOW (ref 22–31)
CREAT SERPL-MCNC: 4.75 MG/DL — HIGH (ref 0.5–1.3)
EGFR: 13 ML/MIN/1.73M2 — LOW
GLUCOSE SERPL-MCNC: 128 MG/DL — HIGH (ref 70–99)
POTASSIUM SERPL-MCNC: 4.1 MMOL/L — SIGNIFICANT CHANGE UP (ref 3.5–5.3)
POTASSIUM SERPL-SCNC: 4.1 MMOL/L — SIGNIFICANT CHANGE UP (ref 3.5–5.3)
SODIUM SERPL-SCNC: 140 MMOL/L — SIGNIFICANT CHANGE UP (ref 135–145)

## 2025-02-20 PROCEDURE — 99285 EMERGENCY DEPT VISIT HI MDM: CPT

## 2025-02-20 PROCEDURE — 96374 THER/PROPH/DIAG INJ IV PUSH: CPT

## 2025-02-20 PROCEDURE — 83605 ASSAY OF LACTIC ACID: CPT

## 2025-02-20 PROCEDURE — 83935 ASSAY OF URINE OSMOLALITY: CPT

## 2025-02-20 PROCEDURE — 82570 ASSAY OF URINE CREATININE: CPT

## 2025-02-20 PROCEDURE — 84132 ASSAY OF SERUM POTASSIUM: CPT

## 2025-02-20 PROCEDURE — 84295 ASSAY OF SERUM SODIUM: CPT

## 2025-02-20 PROCEDURE — 85018 HEMOGLOBIN: CPT

## 2025-02-20 PROCEDURE — 82962 GLUCOSE BLOOD TEST: CPT

## 2025-02-20 PROCEDURE — 96375 TX/PRO/DX INJ NEW DRUG ADDON: CPT

## 2025-02-20 PROCEDURE — 84300 ASSAY OF URINE SODIUM: CPT

## 2025-02-20 PROCEDURE — 76770 US EXAM ABDO BACK WALL COMP: CPT

## 2025-02-20 PROCEDURE — 81001 URINALYSIS AUTO W/SCOPE: CPT

## 2025-02-20 PROCEDURE — 82947 ASSAY GLUCOSE BLOOD QUANT: CPT

## 2025-02-20 PROCEDURE — 84156 ASSAY OF PROTEIN URINE: CPT

## 2025-02-20 PROCEDURE — 82803 BLOOD GASES ANY COMBINATION: CPT

## 2025-02-20 PROCEDURE — 82330 ASSAY OF CALCIUM: CPT

## 2025-02-20 PROCEDURE — 80048 BASIC METABOLIC PNL TOTAL CA: CPT

## 2025-02-20 PROCEDURE — 36415 COLL VENOUS BLD VENIPUNCTURE: CPT

## 2025-02-20 PROCEDURE — 85025 COMPLETE CBC W/AUTO DIFF WBC: CPT

## 2025-02-20 PROCEDURE — 84540 ASSAY OF URINE/UREA-N: CPT

## 2025-02-20 PROCEDURE — 83735 ASSAY OF MAGNESIUM: CPT

## 2025-02-20 PROCEDURE — 80053 COMPREHEN METABOLIC PANEL: CPT

## 2025-02-20 PROCEDURE — 87086 URINE CULTURE/COLONY COUNT: CPT

## 2025-02-20 PROCEDURE — 85014 HEMATOCRIT: CPT

## 2025-02-20 PROCEDURE — 84133 ASSAY OF URINE POTASSIUM: CPT

## 2025-02-20 PROCEDURE — 84100 ASSAY OF PHOSPHORUS: CPT

## 2025-02-20 PROCEDURE — 82435 ASSAY OF BLOOD CHLORIDE: CPT

## 2025-02-20 RX ADMIN — POLYETHYLENE GLYCOL 3350 17 GRAM(S): 17 POWDER, FOR SOLUTION ORAL at 10:03

## 2025-02-20 RX ADMIN — Medication 1300 MILLIGRAM(S): at 05:52

## 2025-02-20 RX ADMIN — Medication 1300 MILLIGRAM(S): at 13:25

## 2025-02-20 NOTE — PROVIDER CONTACT NOTE (OTHER) - BACKGROUND
Patient admitted for urinary retention
Pt admitted for urinary retention w/ metabolic acidosis.
Pt admitted for urinary retention

## 2025-02-20 NOTE — PROGRESS NOTE ADULT - ASSESSMENT
64 year old male with hx of BPH, YG on CKD, obstructive uropathy, urinary retention requiring silva and multiple admissions in the past presents with urinary retention and abnormal labs (elevated Cr and BUN) on outpatient labs today. Pt had been having difficulty urinating with increased lower abdominal pressure and pain. He also felt increased fluid building up in legs. He has had issues with urinary retention in the past and had silva inserted which was later removed. He was voiding independently but always felt incomplete void. Pt is hoping for a urological procedure that would resolve his frequent issues with urinary obstruction. He takes no medications currently. In the ED, silva was placed with drainage of 1.4L of urine. Nephrology consulted for acute on CKD.     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  scr improving  Repeat Bladder scan w/ volume of 600mL.  urology f/u - pt still refusing silva insertion.  NP educated pt on risk for renal failure sec to urinary retention.  He reports he feels fine, is voiding and feels comfortable.  Noted + bladder distention on exam. He continues to refuse silva catheter even w/ urology PA.  Pt states he will f/u w/ his urologist OP.  He does not wish to be prepared for dialysis, no indication of RRT at this time   avoid nephrotoxins   monitor UO and bmp   Pt insisting on going home - d/c on oral bicarb 1300mg TID and needs to go home with silva   outpt fu in 1 week    Acidosis   likely in setting of renal failure  non AG  s/p 150meq of bicarb gtt on 2/19.  CO2 improved.  C/W PO NaHCO3 1300mg TID.  monitor serum co2    Hyperkalemia   s/p hyperkalemia cocktail in ED and lokelma 10g  IVF as above.  Recommended silva catheter for urinary retention.  Correct acidosis as above.  low K diet  monitor K     Obstructive uropathy  urology following  Recommend reinsertion of silva catheter.     64 year old male with hx of BPH, YG on CKD, obstructive uropathy, urinary retention requiring silva and multiple admissions in the past presents with urinary retention and abnormal labs (elevated Cr and BUN) on outpatient labs today. Pt had been having difficulty urinating with increased lower abdominal pressure and pain. He also felt increased fluid building up in legs. He has had issues with urinary retention in the past and had silva inserted which was later removed. He was voiding independently but always felt incomplete void. Pt is hoping for a urological procedure that would resolve his frequent issues with urinary obstruction. He takes no medications currently. In the ED, silva was placed with drainage of 1.4L of urine. Nephrology consulted for acute on CKD.     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  scr improving  Repeat Bladder scan w/ volume of 600mL.  urology f/u - pt still refusing silva insertion.  NP educated pt on risk for renal failure sec to urinary retention.  He reports he feels fine, is voiding and feels comfortable.  Noted + bladder distention on exam. He continues to refuse silva catheter even w/ urology PA.  Pt states he will f/u w/ his urologist OP.  He does not wish to be prepared for dialysis, no indication of RRT at this time   avoid nephrotoxins   monitor UO and bmp   Pt insisting on going home - d/c on oral bicarb 1300mg TID and needs to go home with silva   outpt fu in 1 week    Acidosis   likely in setting of renal failure  non AG  s/p 150meq of bicarb gtt on 2/19.  CO2 improved.  C/W PO NaHCO3 1300mg TID.  monitor serum co2    Hyperkalemia   s/p hyperkalemia cocktail in ED and lokelma 10g  IVF as above.  Recommended silva catheter for urinary retention.  Correct acidosis as above.  low K diet  monitor K     Anemia:  Hgb at goal.  Check iron studies.  Monitor Hgb.    CKD - MBD:  Check PTH.  Monitor PO4 and Ca daily.    Obstructive uropathy  urology following  Recommend reinsertion of silva catheter.

## 2025-02-20 NOTE — DISCHARGE NOTE NURSING/CASE MANAGEMENT/SOCIAL WORK - PATIENT PORTAL LINK FT
You can access the FollowMyHealth Patient Portal offered by Health system by registering at the following website: [email or web address]. By joining [facility]’s FollowMyHealth portal, you will also be able to view your health information using other applications (apps) compatible with our system.

## 2025-02-20 NOTE — PROVIDER CONTACT NOTE (OTHER) - REASON
IV not intact (removed). Patient refuses new placement at this time, therefore unable to administer sodium bicarb infusion.
Patient refused 6 am straight cath #1 with bladder scan of 684 mL urine.
Patient d/c silva - TOV. Bladder scan done 486 mL

## 2025-02-20 NOTE — DISCHARGE NOTE NURSING/CASE MANAGEMENT/SOCIAL WORK - FINANCIAL ASSISTANCE
[facility] provides services at a reduced cost to those who are determined to be eligible through [facility]’s financial assistance program. Information regarding [facility]’s financial assistance program can be found by going to [email or web address] or by calling [phone].

## 2025-02-20 NOTE — PROVIDER CONTACT NOTE (OTHER) - ACTION/TREATMENT ORDERED:
Patient refused. Will "let staff know".
Provider seen at bedside and spoke to patient. Patient will "let staff know" when hes "ready" for new iv placement/ start infusion.
Do a total of three bladder scans, if patient is still remaining on third - replace silva .

## 2025-02-20 NOTE — PROVIDER CONTACT NOTE (OTHER) - ASSESSMENT
Patient a&ox4, refused straight cath w/ bladder scan of 684mL. Patient educated on risks and importance of straight cath. Pt states "not at this time. I appreciate what you're doing, but I need to figure this out for right now."
Patient a&ox4. IV not intact/removed. Patient refuses new iv placement and argumentative w/staff. Patient educated on hospital policy/importance, verbalized understanding and refuses. Sodium bicarb drip unable to be infused at this time. Pt understands.

## 2025-02-20 NOTE — PROGRESS NOTE ADULT - SUBJECTIVE AND OBJECTIVE BOX
[facility]   MD LASHON GARCIA MD ANGELA WONG, THAO SMITH, NP    TEL:  OFFICE: [phone]  From 5pm-7am Answering Service [phone]    -- RENAL FOLLOW UP NOTE ---Date of Service 02-20-25 @ 15:27    Patient is a 64y old  Male who presents with a chief complaint of YG on CKD.    Patient seen and examined at bedside. No chest pain/SOB.    VITALS:  T(F): 98.8 (02-20-25 @ 12:22), Max: 98.8 (02-20-25 @ 12:22)  HR: 95 (02-20-25 @ 12:22)  BP: 97/66 (02-20-25 @ 12:22)  RR: 18 (02-20-25 @ 12:22)  SpO2: 99% (02-20-25 @ 12:22)  Wt(kg): --    02-19 @ 07:01  -  02-20 @ 07:00  --------------------------------------------------------  IN: 2300 mL / OUT: 700 mL / NET: 1600 mL    02-20 @ 07:01  -  02-20 @ 15:27  --------------------------------------------------------  IN: 480 mL / OUT: 1 mL / NET: 479 mL      PHYSICAL EXAM:  General: NAD  Neck: No JVD  Respiratory: CTAB, no wheezes, rales or rhonchi  Cardiovascular: S1, S2, RRR  Gastrointestinal: BS+, soft.  + bladder distention.  Extremities: No peripheral edema    Hospital Medications:   MEDICATIONS  (STANDING):  influenza  Vaccine (HIGH DOSE) 0.5 milliLiter(s) IntraMuscular once  polyethylene glycol 3350 17 Gram(s) Oral daily  senna 2 Tablet(s) Oral at bedtime  sodium bicarbonate 1300 milliGRAM(s) Oral three times a day  tamsulosin 0.4 milliGRAM(s) Oral at bedtime      LABS:  02-20    140  |  104  |  66[H]  ----------------------------<  128[H]  4.1   |  19[L]  |  4.75[H]    Ca    8.6      20 Feb 2025 10:56      Creatinine Trend: 4.75 <--, 4.81 <--, 5.05 <--, 5.69 <--, 6.10 <--, 6.17 <--        Urine Studies:  Urinalysis - [02-20-25 @ 10:56]      Color  / Appearance  / SG  / pH       Gluc 128 / Ketone   / Bili  / Urobili        Blood  / Protein  / Leuk Est  / Nitrite       RBC  / WBC  / Hyaline  / Gran  / Sq Epi  / Non Sq Epi  / Bacteria     Urine Creatinine 30      [02-17-25 @ 23:53]  Urine Protein 1757      [02-17-25 @ 23:53]  Urine Sodium 112      [02-17-25 @ 23:53]  Urine Urea Nitrogen 186      [02-17-25 @ 23:53]  Urine Potassium 12      [02-17-25 @ 23:53]  Urine Osmolality 309      [02-17-25 @ 23:53]    Iron 53, TIBC 363, %sat 15      [10-04-24 @ 06:11]  Ferritin 319      [10-04-24 @ 06:11]

## 2025-02-20 NOTE — CHART NOTE - NSCHARTNOTEFT_GEN_A_CORE
Called by primary team for silva catheter placement. Per primary team, patient refusing silva placement by primary staff/team and requesting urology.   Patient seen at bedside for silva catheter placement. Patient states that at this time, he is refusing silva catheter and would like to go home without it.  Educated patient on urinary retention and risks of urinary retention such as kidney failure and death.  Educated patient on urgent nature of his urinary retention in the setting of his YG.  Educated patient on BPH, different types of catheters preferred for men with BPH, and importance of placing a catheter in this setting as well as the expertise of urology team in placing these catheters in difficult situations.  Educated patient on emergent symptoms that are concerning for worsening retention and kidney failure such as severe abdominal pain/pressure with little to no urination, back pain, fever/chills, difficulty breathing, severe swelling, nausea/vomiting.  Patient still refusing catheter at this time but states he will return to ED or visit with his "local urologist" if he experiences concerning symptoms that he was educated on above.    Discussed with primary team. Called by primary team ~1:30PM for silva catheter placement due to urinary retention. Per primary team, patient refusing silva placement by primary staff/team and requesting urology.   Patient seen at bedside promptly for silva catheter placement. Patient states that at this time, he is refusing silva catheter and would like to go home without it.  Educated patient on urinary retention and risks of urinary retention such as kidney failure and death.  Educated patient on urgent nature of his urinary retention in the setting of his YG.  Educated patient on BPH, different types of catheters preferred for men with BPH, and importance of placing a catheter in this setting as well as the expertise of urology team in placing these catheters in difficult situations.  Educated patient on emergent symptoms that are concerning for worsening retention and kidney failure such as severe abdominal pain/pressure with little to no urination, back pain, fever/chills, difficulty breathing, severe swelling, nausea/vomiting.  Patient still refusing catheter at this time but states he will return to ED or visit with his "local urologist" if he experiences concerning symptoms that he was educated on above.    Discussed with primary team. Called by primary team ~1:30PM for silva catheter placement due to urinary retention. Per primary team, patient refusing silva placement by primary staff/team and requesting urology.   Patient seen at bedside promptly for silva catheter placement. Patient states that at this time, he is refusing silva catheter and would like to go home without it. Patient states he is voiding small amounts and occasionally voids a larger amount. Denies need to urinate at this time.  Educated patient on urinary retention and risks of urinary retention such as kidney failure and death.  Educated patient on urgent nature of his urinary retention in the setting of his YG.  Educated patient on BPH, different types of catheters preferred for men with BPH, and importance of placing a catheter in this setting as well as the expertise of urology team in placing these catheters in difficult situations.  Educated patient on emergent symptoms that are concerning for worsening retention and kidney failure such as severe abdominal pain/pressure with little to no urination, back pain, fever/chills, difficulty breathing, severe swelling, nausea/vomiting.  Patient still refusing catheter at this time but states he will return to ED or visit with his "local urologist" if he is unable to urinate and experiences concerning symptoms that he was educated on above.    Discussed with primary team.

## 2025-02-20 NOTE — DISCHARGE NOTE NURSING/CASE MANAGEMENT/SOCIAL WORK - NSDCFUADDAPPT_GEN_ALL_CORE_FT
APPTS ARE READY TO BE MADE: [X] YES    Best Family or Patient Contact (if needed):    Additional Information about above appointments (if needed):    1: Renal in 1 week   2:   3:     Other comments or requests:

## 2025-02-20 NOTE — DISCHARGE NOTE NURSING/CASE MANAGEMENT/SOCIAL WORK - NSDCPEFALRISK_GEN_ALL_CORE
For information on Fall & Injury Prevention, visit: [email or web address] OR  [email or web address] OR  https://www.cdc.gov/steadi/patient.html

## 2025-02-20 NOTE — PROVIDER CONTACT NOTE (OTHER) - SITUATION
New admit from ED here with urinary retention w/ metabolic acidosis. Patient ordered for continuous sodium bicarb infusion. IV not intact/removed. Pt refuses new placement, unable to infuse drip.
patient silva d/c today around 1/2 pm. Patient is TOV. Pt states hasn't urinated on own since removed. Bladder scan x1 done - 486 mL.
Pt TOV, bladder scan this AM showed 684 mL. Straight cath #1 ordered - pt refused.

## 2025-02-22 ENCOUNTER — EMERGENCY (EMERGENCY)
Facility: HOSPITAL | Age: 65
LOS: 1 days | Discharge: HOME | End: 2025-02-22
Attending: EMERGENCY MEDICINE
Payer: MEDICAID

## 2025-02-22 VITALS
DIASTOLIC BLOOD PRESSURE: 79 MMHG | OXYGEN SATURATION: 99 % | HEIGHT: 72 IN | TEMPERATURE: 98 F | WEIGHT: 154.98 LBS | HEART RATE: 74 BPM | RESPIRATION RATE: 18 BRPM | SYSTOLIC BLOOD PRESSURE: 157 MMHG

## 2025-02-22 LAB
ALBUMIN SERPL ELPH-MCNC: 3.6 G/DL — SIGNIFICANT CHANGE UP (ref 3.3–5)
ALP SERPL-CCNC: 69 U/L — SIGNIFICANT CHANGE UP (ref 40–120)
ALT FLD-CCNC: 15 U/L — SIGNIFICANT CHANGE UP (ref 10–45)
ANION GAP SERPL CALC-SCNC: 12 MMOL/L — SIGNIFICANT CHANGE UP (ref 5–17)
ANION GAP SERPL CALC-SCNC: 14 MMOL/L — SIGNIFICANT CHANGE UP (ref 5–17)
APPEARANCE UR: CLEAR — SIGNIFICANT CHANGE UP
AST SERPL-CCNC: <5 U/L — LOW (ref 10–40)
BACTERIA # UR AUTO: NEGATIVE /HPF — SIGNIFICANT CHANGE UP
BASOPHILS # BLD AUTO: 0.01 K/UL — SIGNIFICANT CHANGE UP (ref 0–0.2)
BASOPHILS NFR BLD AUTO: 0.2 % — SIGNIFICANT CHANGE UP (ref 0–2)
BILIRUB SERPL-MCNC: 0.2 MG/DL — SIGNIFICANT CHANGE UP (ref 0.2–1.2)
BILIRUB UR-MCNC: NEGATIVE — SIGNIFICANT CHANGE UP
BUN SERPL-MCNC: 72 MG/DL — HIGH (ref 7–23)
BUN SERPL-MCNC: 74 MG/DL — HIGH (ref 7–23)
CALCIUM SERPL-MCNC: 8.7 MG/DL — SIGNIFICANT CHANGE UP (ref 8.4–10.5)
CALCIUM SERPL-MCNC: 9.2 MG/DL — SIGNIFICANT CHANGE UP (ref 8.4–10.5)
CAST: 0 /LPF — SIGNIFICANT CHANGE UP (ref 0–4)
CHLORIDE SERPL-SCNC: 106 MMOL/L — SIGNIFICANT CHANGE UP (ref 96–108)
CHLORIDE SERPL-SCNC: 110 MMOL/L — HIGH (ref 96–108)
CO2 SERPL-SCNC: 19 MMOL/L — LOW (ref 22–31)
CO2 SERPL-SCNC: 20 MMOL/L — LOW (ref 22–31)
COLOR SPEC: ABNORMAL
CREAT SERPL-MCNC: 4.76 MG/DL — HIGH (ref 0.5–1.3)
CREAT SERPL-MCNC: 4.99 MG/DL — HIGH (ref 0.5–1.3)
DIFF PNL FLD: ABNORMAL
EGFR: 12 ML/MIN/1.73M2 — LOW
EGFR: 13 ML/MIN/1.73M2 — LOW
EOSINOPHIL # BLD AUTO: 0.14 K/UL — SIGNIFICANT CHANGE UP (ref 0–0.5)
EOSINOPHIL NFR BLD AUTO: 2.3 % — SIGNIFICANT CHANGE UP (ref 0–6)
GLUCOSE SERPL-MCNC: 86 MG/DL — SIGNIFICANT CHANGE UP (ref 70–99)
GLUCOSE SERPL-MCNC: 95 MG/DL — SIGNIFICANT CHANGE UP (ref 70–99)
GLUCOSE UR QL: NEGATIVE MG/DL — SIGNIFICANT CHANGE UP
HCT VFR BLD CALC: 29.6 % — LOW (ref 39–50)
HGB BLD-MCNC: 9.4 G/DL — LOW (ref 13–17)
IMM GRANULOCYTES NFR BLD AUTO: 0.5 % — SIGNIFICANT CHANGE UP (ref 0–0.9)
KETONES UR-MCNC: NEGATIVE MG/DL — SIGNIFICANT CHANGE UP
LEUKOCYTE ESTERASE UR-ACNC: ABNORMAL
LYMPHOCYTES # BLD AUTO: 1.03 K/UL — SIGNIFICANT CHANGE UP (ref 1–3.3)
LYMPHOCYTES # BLD AUTO: 17.2 % — SIGNIFICANT CHANGE UP (ref 13–44)
MCHC RBC-ENTMCNC: 29.1 PG — SIGNIFICANT CHANGE UP (ref 27–34)
MCHC RBC-ENTMCNC: 31.8 G/DL — LOW (ref 32–36)
MCV RBC AUTO: 91.6 FL — SIGNIFICANT CHANGE UP (ref 80–100)
MONOCYTES # BLD AUTO: 0.69 K/UL — SIGNIFICANT CHANGE UP (ref 0–0.9)
MONOCYTES NFR BLD AUTO: 11.5 % — SIGNIFICANT CHANGE UP (ref 2–14)
NEUTROPHILS # BLD AUTO: 4.1 K/UL — SIGNIFICANT CHANGE UP (ref 1.8–7.4)
NEUTROPHILS NFR BLD AUTO: 68.3 % — SIGNIFICANT CHANGE UP (ref 43–77)
NITRITE UR-MCNC: NEGATIVE — SIGNIFICANT CHANGE UP
NRBC BLD AUTO-RTO: 0 /100 WBCS — SIGNIFICANT CHANGE UP (ref 0–0)
PH UR: 7 — SIGNIFICANT CHANGE UP (ref 5–8)
PLATELET # BLD AUTO: 155 K/UL — SIGNIFICANT CHANGE UP (ref 150–400)
POTASSIUM SERPL-MCNC: 5.2 MMOL/L — SIGNIFICANT CHANGE UP (ref 3.5–5.3)
POTASSIUM SERPL-MCNC: 5.4 MMOL/L — HIGH (ref 3.5–5.3)
POTASSIUM SERPL-SCNC: 5.2 MMOL/L — SIGNIFICANT CHANGE UP (ref 3.5–5.3)
POTASSIUM SERPL-SCNC: 5.4 MMOL/L — HIGH (ref 3.5–5.3)
PROT SERPL-MCNC: 6.7 G/DL — SIGNIFICANT CHANGE UP (ref 6–8.3)
PROT UR-MCNC: 30 MG/DL
RBC # BLD: 3.23 M/UL — LOW (ref 4.2–5.8)
RBC # FLD: 15 % — HIGH (ref 10.3–14.5)
RBC CASTS # UR COMP ASSIST: 594 /HPF — HIGH (ref 0–4)
SODIUM SERPL-SCNC: 139 MMOL/L — SIGNIFICANT CHANGE UP (ref 135–145)
SODIUM SERPL-SCNC: 142 MMOL/L — SIGNIFICANT CHANGE UP (ref 135–145)
SP GR SPEC: 1.01 — SIGNIFICANT CHANGE UP (ref 1–1.03)
SQUAMOUS # UR AUTO: 0 /HPF — SIGNIFICANT CHANGE UP (ref 0–5)
UROBILINOGEN FLD QL: 0.2 MG/DL — SIGNIFICANT CHANGE UP (ref 0.2–1)
WBC # BLD: 6 K/UL — SIGNIFICANT CHANGE UP (ref 3.8–10.5)
WBC # FLD AUTO: 6 K/UL — SIGNIFICANT CHANGE UP (ref 3.8–10.5)
WBC UR QL: 10 /HPF — HIGH (ref 0–5)

## 2025-02-22 PROCEDURE — 99223 1ST HOSP IP/OBS HIGH 75: CPT

## 2025-02-22 RX ORDER — SODIUM ZIRCONIUM CYCLOSILICATE 5 G/5G
10 POWDER, FOR SUSPENSION ORAL ONCE
Refills: 0 | Status: DISCONTINUED | OUTPATIENT
Start: 2025-02-22 | End: 2025-02-22

## 2025-02-22 RX ORDER — POLYETHYLENE GLYCOL 3350 17 G/17G
17 POWDER, FOR SOLUTION ORAL DAILY
Refills: 0 | Status: DISCONTINUED | OUTPATIENT
Start: 2025-02-22 | End: 2025-02-26

## 2025-02-22 RX ORDER — SODIUM BICARBONATE 42 MG/ML
1300 INJECTION, SOLUTION INTRAVENOUS THREE TIMES A DAY
Refills: 0 | Status: DISCONTINUED | OUTPATIENT
Start: 2025-02-22 | End: 2025-02-26

## 2025-02-22 RX ORDER — BACTERIOSTATIC SODIUM CHLORIDE 0.9 %
1000 VIAL (ML) INJECTION ONCE
Refills: 0 | Status: COMPLETED | OUTPATIENT
Start: 2025-02-22 | End: 2025-02-22

## 2025-02-22 RX ORDER — TAMSULOSIN HYDROCHLORIDE 0.4 MG/1
0.4 CAPSULE ORAL AT BEDTIME
Refills: 0 | Status: DISCONTINUED | OUTPATIENT
Start: 2025-02-22 | End: 2025-02-26

## 2025-02-22 RX ADMIN — Medication 1000 MILLILITER(S): at 19:03

## 2025-02-22 RX ADMIN — SODIUM BICARBONATE 1300 MILLIGRAM(S): 42 INJECTION, SOLUTION INTRAVENOUS at 21:46

## 2025-02-22 NOTE — ED ADULT NURSE NOTE - OBJECTIVE STATEMENT
65 y/o M with PMH of BPH, presents to the ED with complaints of urinary retention and difficulty urinated. Pt states that he was advised to have a silva but had refused. Pt reports suprapubic pain and denies fevers, chest pain, shortness of breath, nausea, vomiting, diarrhea, constipation. pt A&Ox3 gross neuro intact, no difficulty speaking in complete sentences, pulses x 4, barrett x4, skin intact, s1s2 heart sounds heard, lungs cta bilaterally.

## 2025-02-22 NOTE — ED PROVIDER NOTE - CLINICAL SUMMARY MEDICAL DECISION MAKING FREE TEXT BOX
64-year-old male history of BPH, CKD, obstructive uropathy, multiple prior urinary retention episodes requiring Pratt, presents due to continued urinary retention.  Patient was recently admitted for urinary retention and new YG on CKD with creatinine of 6.17 that improved to 4.75 upon discharge.  Patient unsure of his baseline creatinine but per records has been in the 4-5 range.  Patient was advised to be discharged with a Pratt but refused.  Had minimal urination since and started to develop suprapubic pain as well as headaches.  Denies fevers, chest pain, shortness of breath, nausea, vomiting, diarrhea, constipation.     VSS.  Lungs clear, abdomen nontender, suprapubic fullness, no CVA tenderness.  Bladder scan with approximately 650 cc.  Will place Pratt, eval for UTI, eval creatinine.  Patient agrees to be discharged with Pratt and will follow-up with his urologist.

## 2025-02-22 NOTE — ED PROVIDER NOTE - NSICDXPASTMEDICALHX_GEN_ALL_CORE_FT
SW/CM Discharge Plan    Informed patient is ready for discharge. Patient’s discharge destination is [facility]. Patient to be picked up by [facility] ([phone]) at 1630. Patient/interested person has been counseled for post hospitalization care.  Patient agrees and understands goals and plan. Initial implementation of the patient’s discharge plan has been arranged, including any devices/equipment needed for discharge. Discharge plan communicated to MD, RN, Receiving Facility/Agency and patient. RN Report number provided to RN: [phone]. D/C summary and AVS faxed to [facility]. ALEKSEY prepared transfer packet per department protocol.    After Visit Summary - Transition Report Information  Receiving Agency/Facility: [facility]  Receiving Agency/Facility phone number: [phone]  Receiving Agency/Facility fax number: [phone]  Receiving Agency/Facility address: [address] [place] [address]  Receiving Agency/Facility city/state: [place], [place]  Receiving Agency/Facility Type: Skilled Nursing Facility - Subacute   PAST MEDICAL HISTORY:  Acute kidney injury superimposed on CKD     COVID-19 vaccination refused     DVT, lower extremity     Lower extremity edema     Neoplasm of unspecified behavior of bladder     Obstructive uropathy     Refusal of blood product     Retention of urine, unspecified

## 2025-02-22 NOTE — ED ADULT TRIAGE NOTE - CHIEF COMPLAINT QUOTE
dysuria, unable to fully empty bladder  pt states they were discharged 2 days ago for same complaint  h/x BPH requiring silva

## 2025-02-22 NOTE — ED CDU PROVIDER INITIAL DAY NOTE - ATTENDING APP SHARED VISIT CONTRIBUTION OF CARE
silva placed w/ 2L output, Cr 5 GFR 12 (both baseline), K 5.4, no overt infx on UA, uro consulted. CDU for POD, electrolyte monitoring, monitor urine output.  recs pending, ivf, monitor urine outflow.

## 2025-02-22 NOTE — ED ADULT NURSE REASSESSMENT NOTE - NS ED NURSE REASSESS COMMENT FT1
Pt received from NEYDA Benitez at 2115. Pt oriented to CDU and plan of care was discussed. Pt is observed for urinary retention, here for tele, BMP q6, PO hydration, monitor urinary output. Pink tinged urine draining from Pratt w/ small clot noted. A&Ox4, obeys commands, ambulatory, independent. Respirations spontaneous and unlabored. Denies SOB, dyspnea, cough, CP, palpitations. Pt refusing CM at this time, CDU PA Osmany Streeter aware, EKG done. IV site patent, no signs of infiltration noted. Denies N/V/D/C. Pt afebrile, denies chills. Pt resting in bed. Safety & comfort measures maintained. Call bell within reach.

## 2025-02-22 NOTE — ED ADULT NURSE REASSESSMENT NOTE - NS ED NURSE REASSESS COMMENT FT1
14F silva placed using sterile technique as per orders. 2 RN's at the bedside. Sterility maintained. Patient tolerated well. Urine collected and sent to lab. 2L of dark yellow urine noted

## 2025-02-22 NOTE — CONSULT NOTE ADULT - SUBJECTIVE AND OBJECTIVE BOX
HPI:  64y Male with PMHx BPH, YG on CKD, obstructive uropathy, urinary retention requiring silva and multiple admissions in the past, most admission from - due to urinary retention and YG, presents to ED with urinary retention. Silva placed in ED and urology consulted for possible post obstructive diuresis. Per ED provider the initial bladder scan showed 650cc urinary retention, and about 2.2L urine output within 3 hours after silva placed. Patient states he was discharge 2 days ago and no problem with urination until yesterday. Pt had been having difficulty urinating with increased lower abdominal pressure and pain. He said he was voiding independently but always felt incomplete void. Pt is hoping for a urological procedure that would resolve this urinary obstruction. Denies hematuria, flank pain, dysuria, or any other urinary symptoms. Denies fever, chills, sob, chest pain.      PAST MEDICAL & SURGICAL HISTORY:  Acute kidney injury superimposed on CKD      Obstructive uropathy      Lower extremity edema      COVID-19 vaccination refused      Neoplasm of unspecified behavior of bladder      Retention of urine, unspecified      Refusal of blood product      DVT, lower extremity      No significant past surgical history          FAMILY HISTORY:  No known  malignancy     Denies alcohol and drug abuse, nonsmoker     MEDICATIONS  (STANDING):    MEDICATIONS  (PRN):    Allergies    No Known Allergies    Intolerances      REVIEW OF SYSTEMS: Pertinent positives and negatives as stated in HPI, otherwise negative    Vital signs  T(C): 36.7 (25 @ 19:00), Max: 36.7 (25 @ 19:00)  HR: 78 (25 @ 19:00)  BP: 179/102 (25 @ 19:00)  SpO2: 98% (25 @ 19:00)  Wt(kg): --    Physical Exam  Gen: NAD  HEENT: normocephalic, atraumatic, no scleral icterus  Pulm: CTA b/l, No respiratory distress, no subcostal retractions, no accessory muscle use   CV: S1 S2, RRR,  no JVD  Abd: Soft, NT, ND, no rebound tenderness or guarding  : Silva in placed with clear light yellow urine    LABS:  CBC                       9.4    6.00  )-----------( 155      ( 2025 16:28 )             29.6     BMP       139  |  106  |  74[H]  ----------------------------<  86  5.4[H]   |  19[L]  |  4.99[H]    Ca    9.2      2025 16:28    TPro  6.7  /  Alb  3.6  /  TBili  0.2  /  DBili  x   /  AST  <5[L]  /  ALT  15  /  AlkPhos  69          Urinalysis Basic - ( 2025 18:16 )    Color: Orange / Appearance: Clear / S.010 / pH: x  Gluc: x / Ketone: Negative mg/dL  / Bili: Negative / Urobili: 0.2 mg/dL   Blood: x / Protein: 30 mg/dL / Nitrite: Negative   Leuk Esterase: Trace / RBC: 594 /HPF / WBC 10 /HPF   Sq Epi: x / Non Sq Epi: 0 /HPF / Bacteria: Negative /HPF      Urine Cx:   Blood Cx:    Radiology: HPI:  64y Male with PMHx BPH, YG on CKD, obstructive uropathy, urinary retention requiring silva and multiple admissions in the past, most admission from - due to urinary retention and YG, presents to ED with urinary retention. Silva placed in ED and urology consulted for possible post obstructive diuresis. Per ED provider the initial bladder scan showed 650cc urinary retention, and about 2.2L urine output within 3 hours after silva placed. Patient states he was discharge 2 days ago and no problem with urination until yesterday. Pt had been having difficulty urinating with increased lower abdominal pressure and pain. He said he was voiding independently but always felt incomplete void. Pt is hoping for a urological procedure that would resolve this urinary obstruction. Denies hematuria, flank pain, dysuria, or any other urinary symptoms. Denies fever, chills, sob, chest pain.      PAST MEDICAL & SURGICAL HISTORY:  Acute kidney injury superimposed on CKD      Obstructive uropathy      Lower extremity edema      COVID-19 vaccination refused      Neoplasm of unspecified behavior of bladder      Retention of urine, unspecified      Refusal of blood product      DVT, lower extremity      No significant past surgical history          FAMILY HISTORY:  No known  malignancy     Denies alcohol and drug abuse, nonsmoker     MEDICATIONS  (STANDING):    MEDICATIONS  (PRN):    Allergies    No Known Allergies    Intolerances      REVIEW OF SYSTEMS: Pertinent positives and negatives as stated in HPI, otherwise negative    Vital signs  T(C): 36.7 (25 @ 19:00), Max: 36.7 (25 @ 19:00)  HR: 78 (25 @ 19:00)  BP: 179/102 (25 @ 19:00)  SpO2: 98% (25 @ 19:00)  Wt(kg): --    Physical Exam  Gen: NAD  HEENT: normocephalic, atraumatic, no scleral icterus  Pulm: CTA b/l, No respiratory distress, no subcostal retractions, no accessory muscle use   CV: S1 S2, RRR,  no JVD  Abd: Soft, NT, ND, no rebound tenderness or guarding  : Silva in placed with clear light yellow urine    LABS:  CBC                       9.4    6.00  )-----------( 155      ( 2025 16:28 )             29.6     BMP       139  |  106  |  74[H]  ----------------------------<  86  5.4[H]   |  19[L]  |  4.99[H]    Ca    9.2      2025 16:28    TPro  6.7  /  Alb  3.6  /  TBili  0.2  /  DBili  x   /  AST  <5[L]  /  ALT  15  /  AlkPhos  69          Urinalysis Basic - ( 2025 18:16 )    Color: Orange / Appearance: Clear / S.010 / pH: x  Gluc: x / Ketone: Negative mg/dL  / Bili: Negative / Urobili: 0.2 mg/dL   Blood: x / Protein: 30 mg/dL / Nitrite: Negative   Leuk Esterase: Trace / RBC: 594 /HPF / WBC 10 /HPF   Sq Epi: x / Non Sq Epi: 0 /HPF / Bacteria: Negative /HPF     HPI:  64y Male with PMHx BPH, YG on CKD, obstructive uropathy, urinary retention requiring silva and multiple admissions in the past, most admission from - due to urinary retention and YG, rec'ed dc with silva but he refused, presents to ED with urinary retention. Silva placed in ED and urology consulted for possible post obstructive diuresis. Per ED provider, the initial bladder scan showed 650cc urinary retention, and about 2.2L urine output over 3 hours after silva placed. Patient states he was discharged 2 days ago and no problem with urination until yesterday. Pt had been having difficulty urinating with increased lower abdominal pressure and pain. He said he was voiding independently but always felt incomplete void. Pt is hoping for a urological procedure that would resolve this urinary obstruction. Denies hematuria, flank pain, dysuria, or any other urinary symptoms. Denies fever, chills, sob, chest pain.      PAST MEDICAL & SURGICAL HISTORY:  Acute kidney injury superimposed on CKD      Obstructive uropathy      Lower extremity edema      COVID-19 vaccination refused      Neoplasm of unspecified behavior of bladder      Retention of urine, unspecified      Refusal of blood product      DVT, lower extremity      No significant past surgical history          FAMILY HISTORY:  No known  malignancy     Denies alcohol and drug abuse, nonsmoker     MEDICATIONS  (STANDING):    MEDICATIONS  (PRN):    Allergies    No Known Allergies    Intolerances      REVIEW OF SYSTEMS: Pertinent positives and negatives as stated in HPI, otherwise negative    Vital signs  T(C): 36.7 (25 @ 19:00), Max: 36.7 (25 @ 19:00)  HR: 78 (25 @ 19:00)  BP: 179/102 (25 @ 19:00)  SpO2: 98% (25 @ 19:00)  Wt(kg): --    Physical Exam  Gen: NAD  HEENT: normocephalic, atraumatic, no scleral icterus  Pulm: CTA b/l, No respiratory distress, no subcostal retractions, no accessory muscle use   CV: S1 S2, RRR,  no JVD  Abd: Soft, NT, ND, no rebound tenderness or guarding  : Silva in placed with clear light yellow urine    LABS:  CBC                       9.4    6.00  )-----------( 155      ( 2025 16:28 )             29.6     BMP   02-22    139  |  106  |  74[H]  ----------------------------<  86  5.4[H]   |  19[L]  |  4.99[H]    Ca    9.2      2025 16:28    TPro  6.7  /  Alb  3.6  /  TBili  0.2  /  DBili  x   /  AST  <5[L]  /  ALT  15  /  AlkPhos  69          Urinalysis Basic - ( 2025 18:16 )    Color: Orange / Appearance: Clear / S.010 / pH: x  Gluc: x / Ketone: Negative mg/dL  / Bili: Negative / Urobili: 0.2 mg/dL   Blood: x / Protein: 30 mg/dL / Nitrite: Negative   Leuk Esterase: Trace / RBC: 594 /HPF / WBC 10 /HPF   Sq Epi: x / Non Sq Epi: 0 /HPF / Bacteria: Negative /HPF

## 2025-02-22 NOTE — CONSULT NOTE ADULT - ASSESSMENT
65yo M with BPH, CKD presents with YG on CKD and AUR s/p silva placement. Urology consulted for POD.   In ED 65yo M with BPH, CKD presents with YG on CKD and AUR s/p silva placement. Urology consulted for POD.   In ED, Patient AVSS, silva in place, drain properly with clear light yellow urine. Initial bladder scan 650cc. UOP after silva 2.2L per ED provider. WBC wnl, H/H 9/29, Cr 4.99 (baseline 4-5). Na 139, K 5.4. UA trace LE, 10WBC, 594RBC.     Plan:  - No acute  intervention at this time  - Continue monitor UOP, Labs, vitals at CDU  - Based on UOP after silva placement and elevated Cr level, pt at high risk of going into Post Obstructive Diuresis.  - Please obtain q6hrs BMP to monitor electrolytes.   - Replete electrolytes as necessary based on labs  - Kelsey have two pitchers of water at the bedside at all times and encourage the patient to drink to thirst. If pt unable to drink to thirst, may replete fluids at a rate of half the hourly output with 1/2NS.  - If you have any further questions or concerns, please feel free to page the Urology Department at [phone]     65yo M with BPH, CKD presents with YG on CKD and AUR s/p silva placement. Urology consulted for POD.   In ED, Patient AVSS, silva in place, drain properly with clear light yellow urine. Initial bladder scan 650cc. UOP after silva 2.2L per ED provider. WBC wnl, H/H 9/29, Cr 4.99 (baseline 4-5). Na 139, K 5.4. UA trace LE, 10WBC, 594RBC.     Plan:  - No acute  intervention at this time, Continue silva  - Continue monitor UOP, Labs, vitals at CDU  - Based on UOP after silva placement and elevated Cr level, pt at high risk of going into Post Obstructive Diuresis.  - Please obtain q6hrs BMP to monitor electrolytes.   - Replete electrolytes as necessary based on labs  - Kelsey have two pitchers of water at the bedside at all times and encourage the patient to drink to thirst. If pt unable to drink to thirst, may replete fluids at a rate of half the hourly output with 1/2NS.  - Needs outpatient follow up with silva catheter to discuss further management of BPH and retention.  - If you have any further questions or concerns, please feel free to page the Urology Department at [phone]     65yo M with BPH, CKD presents with YG on CKD and AUR s/p silva placement. Urology consulted for POD.   In ED, Patient AVSS, but hypertensive to 179/102, sliva in place, drain properly with clear light yellow urine. Initial bladder scan 650cc. UOP after silva 2.2L per ED provider. WBC wnl, H/H 9/29, Cr 4.99 (baseline 3-6). Na 139, K 5.4. UA trace LE, 10WBC, 594RBC. CTAP 7/2024 shows 51g prostate, b/l severe hydro, RBUS on 2/2025 shows b/l severe hydro.    Plan:  - No acute  intervention at this time, Continue silva  - Monitor in CDU overnight  - Will speak to patient in AM to see if he's amenable to going home with silva this time and f/u outpatient for bladder outlet procedure   - Based on UOP after silva placement and elevated Cr level, pt at high risk of going into Post Obstructive Diuresis.  - Please obtain q6hrs BMP to monitor electrolytes.   - Replete electrolytes as necessary based on labs  - Kelsey have two pitchers of water at the bedside at all times and encourage the patient to drink to thirst. If pt unable to drink to thirst, may replete fluids at a rate of half the hourly output with 1/2NS.  - If you have any further questions or concerns, please feel free to page the Urology Department at [phone]  - Case discussed with attending

## 2025-02-22 NOTE — ED PROVIDER NOTE - PHYSICAL EXAMINATION
GEN: NAD. A&Ox3. Non-toxic appearing.  HEENT: normocephalic, atraumatic, EOMI, PERRL, moist MM  CARDIAC: RRR, S1, S2, no murmur. Radial pulses present and symmetric b/l  PULM: CTA B/L no wheeze, rhonchi, rales.   ABD: soft NT, ND, no rebound no guarding, suprapubic fullness with ttp, no CVA ttp  MSK: Moving all extremities, no edema  NEURO: gait normal, no focal neurological deficits, CN 2-12 grossly intact  SKIN: warm, dry, no rash.

## 2025-02-22 NOTE — ED CDU PROVIDER INITIAL DAY NOTE - OBJECTIVE STATEMENT
64-year-old male history of BPH, CKD, obstructive uropathy, multiple prior urinary retention episodes requiring Silva, presents due to continued urinary retention.  Patient was recently admitted for urinary retention and new YG on CKD with creatinine of 6.17 that improved to 4.75 upon discharge.  Patient unsure of his baseline creatinine but per records has been in the 4-5 range.  Patient was advised to be discharged with a Silva but refused.  Had minimal urination since and started to develop suprapubic pain as well as headaches.  Denies fevers, chest pain, shortness of breath, nausea, vomiting, diarrhea, constipation.  In ED, silva placed w/ 2L output, Cr 5 GFR 12 (both baseline), K 5.4, no overt infx on UA, uro consulted. CDU for POD, electrolyte monitoring, monitor urine output.

## 2025-02-22 NOTE — ED CDU PROVIDER INITIAL DAY NOTE - PHYSICAL EXAMINATION
GEN: Pt non-toxic in NAD, alert.  PSYCH: Affect and mood appropriate.  EYES: Sclera white w/o injection.   ENT: No JVD. Airway patent.  RESP: CTA b/l.  CARDIAC: RRR, S1, S2, no M/G/R.  ABD: Soft, NT. Pratt w/ pink tinged urine, no clots.  MSK: DUMONT spontaneously.  NEURO: Nonfocal.  VASC: Strong distal pulses. Trace b/l LE pitting edema, no calf swelling/ttp.  SKIN: No rashes or lesions. GEN: Pt non-toxic in NAD, alert.  PSYCH: Affect and mood appropriate.  EYES: Sclera white w/o injection.   ENT: No JVD. Airway patent.  RESP: CTA b/l.  CARDIAC: RRR, S1, S2, no M/G/R.  ABD: Soft, NT. Pratt w/ pink tinged urine, no clots.  MSK: DUMONT spontaneously.  NEURO: Nonfocal.  VASC: Strong distal pulses. Trace b/l pedal pitting edema, no calf swelling/ttp.  SKIN: No rashes or lesions.

## 2025-02-22 NOTE — ED ADULT NURSE NOTE - NS_NURSE_DISC_ED_ALL_ED_PROVIDEDBY
She has a yeast infection and wants script for diflucan called in to [facility] on [address]    ACP

## 2025-02-23 VITALS
HEART RATE: 83 BPM | RESPIRATION RATE: 16 BRPM | TEMPERATURE: 98 F | OXYGEN SATURATION: 99 % | DIASTOLIC BLOOD PRESSURE: 70 MMHG | SYSTOLIC BLOOD PRESSURE: 122 MMHG

## 2025-02-23 LAB
ANION GAP SERPL CALC-SCNC: 13 MMOL/L — SIGNIFICANT CHANGE UP (ref 5–17)
BUN SERPL-MCNC: 67 MG/DL — HIGH (ref 7–23)
CALCIUM SERPL-MCNC: 8.9 MG/DL — SIGNIFICANT CHANGE UP (ref 8.4–10.5)
CHLORIDE SERPL-SCNC: 109 MMOL/L — HIGH (ref 96–108)
CO2 SERPL-SCNC: 19 MMOL/L — LOW (ref 22–31)
CREAT SERPL-MCNC: 4.4 MG/DL — HIGH (ref 0.5–1.3)
EGFR: 14 ML/MIN/1.73M2 — LOW
GLUCOSE SERPL-MCNC: 146 MG/DL — HIGH (ref 70–99)
POTASSIUM SERPL-MCNC: 4.6 MMOL/L — SIGNIFICANT CHANGE UP (ref 3.5–5.3)
POTASSIUM SERPL-SCNC: 4.6 MMOL/L — SIGNIFICANT CHANGE UP (ref 3.5–5.3)
SODIUM SERPL-SCNC: 141 MMOL/L — SIGNIFICANT CHANGE UP (ref 135–145)

## 2025-02-23 PROCEDURE — 85025 COMPLETE CBC W/AUTO DIFF WBC: CPT

## 2025-02-23 PROCEDURE — 93005 ELECTROCARDIOGRAM TRACING: CPT | Mod: XU

## 2025-02-23 PROCEDURE — 80048 BASIC METABOLIC PNL TOTAL CA: CPT

## 2025-02-23 PROCEDURE — G0378: CPT

## 2025-02-23 PROCEDURE — 99238 HOSP IP/OBS DSCHRG MGMT 30/<: CPT

## 2025-02-23 PROCEDURE — 80053 COMPREHEN METABOLIC PANEL: CPT

## 2025-02-23 PROCEDURE — 81001 URINALYSIS AUTO W/SCOPE: CPT

## 2025-02-23 PROCEDURE — 87086 URINE CULTURE/COLONY COUNT: CPT

## 2025-02-23 PROCEDURE — 99284 EMERGENCY DEPT VISIT MOD MDM: CPT | Mod: 25

## 2025-02-23 PROCEDURE — 99284 EMERGENCY DEPT VISIT MOD MDM: CPT

## 2025-02-23 PROCEDURE — 51702 INSERT TEMP BLADDER CATH: CPT

## 2025-02-23 RX ADMIN — SODIUM BICARBONATE 1300 MILLIGRAM(S): 42 INJECTION, SOLUTION INTRAVENOUS at 05:42

## 2025-02-23 NOTE — PROGRESS NOTE ADULT - SUBJECTIVE AND OBJECTIVE BOX
Subjective:  Resting comfortably    Objective:    Vital signs  T(C): , Max: 36.8 (02-23-25 @ 00:26)  HR: 62 (02-23-25 @ 07:43)  BP: 152/79 (02-23-25 @ 07:43)  SpO2: 99% (02-23-25 @ 07:43)  Wt(kg): --    Output     02-22 @ 07:01  -  02-23 @ 07:00  --------------------------------------------------------  IN: 0 mL / OUT: 1700 mL / NET: -1700 mL    02-23 @ 07:01  -  02-23 @ 08:09  --------------------------------------------------------  IN: 0 mL / OUT: 1100 mL / NET: -1100 mL        Gen: NAD  Abd: soft, nontender, nondistended  : shira secured in place, draining CYU    Labs                        9.4    6.00  )-----------( 155      ( 22 Feb 2025 16:28 )             29.6     23 Feb 2025 06:26    141    |  109    |  67     ----------------------------<  146    4.6     |  19     |  4.40     Ca    8.9        23 Feb 2025 06:26        Urine Cx: ?  Blood Cx: ?      Imaging Subjective:  Resting comfortably, no longer complains of headache or abdominal pain    Objective:    Vital signs  T(C): , Max: 36.8 (02-23-25 @ 00:26)  HR: 62 (02-23-25 @ 07:43)  BP: 152/79 (02-23-25 @ 07:43)  SpO2: 99% (02-23-25 @ 07:43)  Wt(kg): --    Output     02-22 @ 07:01  -  02-23 @ 07:00  --------------------------------------------------------  IN: 0 mL / OUT: 1700 mL / NET: -1700 mL    02-23 @ 07:01  -  02-23 @ 08:09  --------------------------------------------------------  IN: 0 mL / OUT: 1100 mL / NET: -1100 mL        Gen: NAD  Abd: soft, nontender, nondistended  : shira secured in place, draining CYU    Labs                        9.4    6.00  )-----------( 155      ( 22 Feb 2025 16:28 )             29.6     23 Feb 2025 06:26    141    |  109    |  67     ----------------------------<  146    4.6     |  19     |  4.40     Ca    8.9        23 Feb 2025 06:26        Urine Cx: ?  Blood Cx: ?      Imaging Subjective:  Resting comfortably, no longer complains of headache or abdominal pain    Objective:    Vital signs  T(C): , Max: 36.8 (02-23-25 @ 00:26)  HR: 62 (02-23-25 @ 07:43)  BP: 152/79 (02-23-25 @ 07:43)  SpO2: 99% (02-23-25 @ 07:43)  Wt(kg): --    Output     02-22 @ 07:01  -  02-23 @ 07:00  --------------------------------------------------------  IN: 0 mL / OUT: 1700 mL / NET: -1700 mL    02-23 @ 07:01  -  02-23 @ 08:09  --------------------------------------------------------  IN: 0 mL / OUT: 1100 mL / NET: -1100 mL        Gen: NAD  Abd: soft, nontender, nondistended  : silva secured in place, draining CYU  resp: wnl, no tachypnea  neuro: non-focal    Labs                        9.4    6.00  )-----------( 155      ( 22 Feb 2025 16:28 )             29.6     23 Feb 2025 06:26    141    |  109    |  67     ----------------------------<  146    4.6     |  19     |  4.40     Ca    8.9        23 Feb 2025 06:26        Urine Cx: ?  Blood Cx: ?      Imaging

## 2025-02-23 NOTE — ED CDU PROVIDER DISPOSITION NOTE - CARE PROVIDERS DIRECT ADDRESSES
[email or web address]

## 2025-02-23 NOTE — PROGRESS NOTE ADULT - ATTENDING COMMENTS
pt seen and examined  films reviewed  labs reviewed- sig retention, perhaps now permanent CKD, though will need to assess creatinine jose raul after drainage  d/w pt who appears phobic about surgical intervention; we reviewed risks/benefits of silva, clean intermittent cath, SP tube, all as alternatives to definitive surgery  we also spoke about benefits of short term cath now to decompress bladder  can recheck BMP in office in approx 10-14 days  will also arrange appt with urodynamics and cysto to eval bladder functionality, anatomy, and   provide direction on options- pt amenable to CIC, which we can teach as well

## 2025-02-23 NOTE — ED CDU PROVIDER DISPOSITION NOTE - ATTENDING CONTRIBUTION TO CARE
I , Dr Enrique Garibay has seen and evaluated the patient.  The patient reports improvement in symptoms.  The patient is stable for discharge home and will follow up with their primary physician, urology and nephrology.

## 2025-02-23 NOTE — PROGRESS NOTE ADULT - ASSESSMENT
63yo M with BPH, CKD presents with YG on CKD and AUR s/p silva placement. Urology consulted for POD.   In ED, Patient AVSS, but hypertensive to 179/102, silva in place, drain properly with clear light yellow urine. Initial bladder scan 650cc. UOP after silva 2.2L per ED provider. WBC wnl, H/H 9/29, Cr 4.99 (baseline 3-6). Na 139, K 5.4. UA trace LE, 10WBC, 594RBC. CTAP 7/2024 shows 51g prostate, b/l severe hydro, RBUS on 2/2025 shows b/l severe hydro.    Plan:  - No acute  intervention at this time, Continue silva  - Will speak to patient in AM to see if he's amenable to going home with silva this time and f/u outpatient for bladder outlet procedure   - Labs reviewed > downtrending Cr, electrolytes wnl  - Kelsey have two pitchers of water at the bedside at all times and encourage the patient to drink to thirst. If pt unable to drink to thirst, may replete fluids at a rate of half the hourly output with 1/2NS.    ***INCOMPLETE***     63yo M with BPH, CKD presents with YG on CKD and AUR s/p silva placement. Urology consulted for POD.   In ED, Patient AVSS, but hypertensive to 179/102, silva in place, drain properly with clear light yellow urine. Initial bladder scan 650cc. UOP after silva 2.2L per ED provider. WBC wnl, H/H 9/29, Cr 4.99 (baseline 3-6). Na 139, K 5.4. UA trace LE, 10WBC, 594RBC. CTAP 7/2024 shows 51g prostate, b/l severe hydro, RBUS on 2/2025 shows b/l severe hydro.    Patient still with hesitation about bladder outlet procedure, but very keen on idea to perform clean intermittent catheterization (CIC) and will consider outlet procedure down the road if CIC is not working for him. Amenable to going home with catheter to allow for bladder decompression prior to voiding dysfunction workup in the office.    Plan:  - No acute  intervention at this time, Continue silva  - Labs reviewed > downtrending Cr, electrolytes wnl  - Patient amenable to going home with catheter for 1-2 weeks to allow bladder to decompress and recover and f/u with Dr. Hoenig outpatient (tentatively 3/5/25) for a cystoscopy and urodynamics study (UDS).  - Ok to d/c home w catheter from urologic perspective    Seen and d/w Dr. Hoenig  [facility]  [address] [place] [phone]

## 2025-02-23 NOTE — ED CDU PROVIDER SUBSEQUENT DAY NOTE - CLINICAL SUMMARY MEDICAL DECISION MAKING FREE TEXT BOX
amira - al on ckd complicated with post obstructive uropathy from urinary rentrion, monitor cr, iv repletion for diuresis and freq reevla

## 2025-02-23 NOTE — ED CDU PROVIDER SUBSEQUENT DAY NOTE - PROGRESS NOTE DETAILS
Per overnight CDU RN, pt's UOP at 4am total 1700cc, and this AM CDU RN reports pt with 1100cc UOP since then. Urology called to make aware. Plan to also consult nephro. D/w Dr. Garibay. amira pt seen and evaluated - wants to go home - pt educated regarding his dz, that his issues are not just 2/2 to prostate - inforced that pt has CKD and needs to see a nephrologist - pt noncompliant w bicarb as prev reccommended - last bmp cr 4.4 ( pt has cr in 2-3 on several occasions within 6 months - renal and uro to see this am Per overnight CDU RN, pt's UOP at 4am total 1700cc, and this AM CDU RN reports pt with 1100cc UOP since then. Urology called to make aware. Plan to also consult nephro. D/w Dr. Garibay.  Pt ambulatory in CDU, NAD. S. Patient seen by urology Attg, cleared for discharge from uro standpoint, to be d/c with silva and f/u in office for further evaluation and management. Pt pending nephrology eval. Patient seen by nephrology NP Lois Morales, recommending pt to continue bicarb as previously prescribed and follow up in office. JEAN MARIE Morales reports she gave patient f/u information. Pt reports he has bicarb at home to take. D/c d/w Dr. Garibay.

## 2025-02-23 NOTE — ED CDU PROVIDER DISPOSITION NOTE - NSFOLLOWUPINSTRUCTIONS_ED_ALL_ED_FT
Follow-up with your primary care provider within 1 to 3 days.      Additionally follow-up with your nephrologist and urologist within 1 week.    Continue to take all medications as prescribed.  Read attached discharge information on leg bag.    Rest and drink plenty of fluids. Pain can be managed with Acetaminophen (aka Tylenol) and Ibuprofen (aka Motrin or Advil) over the counter as directed.    Return to the ER for any new or worsening symptoms, difficulty passing urine through the catheter, bloody urine, fever, chills, abdominal pain, or if any other concerns. Please keep silva catheter in place until follow up with Dr. Hoenig.   Follow up with Urologist in office on 3/5/25 as discussed.     Nephro---------    Follow-up with your primary care provider within 1 to 3 days.      Continue to take all medications as prescribed.    Read attached discharge information on leg bag.    Rest and drink plenty of fluids.     Return to the ER for any new or worsening symptoms, difficulty passing urine through the catheter, bloody urine, fever, chills, abdominal pain, or if any other concerns. Please keep silva catheter in place until follow up with Dr. Hoenig.   Follow up with Urologist in office on 3/5/25 as discussed.     Follow up with Nephrologist in office this week for further management.     Follow-up with your primary care provider within 1 to 3 days.      Continue to take all medications as prescribed.      Rest and drink plenty of fluids.     Return to the ER for any new or worsening symptoms, difficulty passing urine through the catheter, bloody urine, fever, chills, abdominal pain, vomiting, or if any other concerns.    Hoenig, David Mayer  Urology  [address] [facility] [address]  Phone: [phone]    Lois Morales   in [facility]  [id number] [place] [address]  Phone: [phone]

## 2025-02-23 NOTE — ED CDU PROVIDER DISPOSITION NOTE - PATIENT PORTAL LINK FT
You can access the FollowMyHealth Patient Portal offered by [facility] by registering at the following website: [email or web address]. By joining [employer]’s FollowMyHealth portal, you will also be able to view your health information using other applications (apps) compatible with our system.

## 2025-02-23 NOTE — ED CDU PROVIDER DISPOSITION NOTE - CLINICAL COURSE
64-year-old male history of BPH, CKD, obstructive uropathy, multiple prior urinary retention episodes requiring Silva, presents due to continued urinary retention.  Patient was recently admitted for urinary retention and new YG on CKD with creatinine of 6.17 that improved to 4.75 upon discharge.  Patient unsure of his baseline creatinine but per records has been in the 4-5 range.  Patient was advised to be discharged with a Silva but refused.  Had minimal urination since and started to develop suprapubic pain as well as headaches.  Denies fevers, chest pain, shortness of breath, nausea, vomiting, diarrhea, constipation.  In ED, silva placed w/ 2L output, Cr 5 GFR 12 (both baseline), K 5.4, no overt infx on UA, uro consulted. CDU for POD, electrolyte monitoring, monitor urine output.  In CDU, 64-year-old male history of BPH, CKD, obstructive uropathy, multiple prior urinary retention episodes requiring Silva, presents due to continued urinary retention.  Patient was recently admitted for urinary retention and new YG on CKD with creatinine of 6.17 that improved to 4.75 upon discharge.  Patient unsure of his baseline creatinine but per records has been in the 4-5 range.  Patient was advised to be discharged with a Silva but refused.  Had minimal urination since and started to develop suprapubic pain as well as headaches.  Denies fevers, chest pain, shortness of breath, nausea, vomiting, diarrhea, constipation.  In ED, silva placed w/ 2L output, Cr 5 GFR 12 (both baseline), K 5.4, no overt infx on UA, uro consulted. CDU for POD, electrolyte monitoring, monitor urine output.  In CDU, Cr downtrending. patient seen by urology and cleared for d/c with silva to f/u in office.   Patient seen by nephrology NP Lois Morales, recommending pt to continue bicarb as previously prescribed and follow up in office. JEAN MARIE Morales reports she gave patient f/u information. Pt reports he has bicarb at home to take.

## 2025-02-23 NOTE — ED CDU PROVIDER SUBSEQUENT DAY NOTE - HISTORY
Patient reassessed resting comfortably without any new or evolving complaints.  Gravity bag with pink-tinged urine.  Repeat BMP with normal potassium 5.2, creatinine stable.  Will continue to monitor urine output.  Repeat BMP this morning.

## 2025-02-23 NOTE — ED CDU PROVIDER SUBSEQUENT DAY NOTE - PHYSICAL EXAMINATION
GEN: Pt non-toxic in NAD, alert.  PSYCH: Affect and mood appropriate.  EYES: Sclera white w/o injection.   ENT: No JVD. Airway patent.  RESP: CTA b/l.  CARDIAC: RRR, S1, S2, no M/G/R.  ABD: Soft, NT. Pratt w/ pink tinged urine, no clots.  MSK: DUMONT spontaneously.  NEURO: Nonfocal.  VASC: Strong distal pulses. Trace b/l pedal edema, no calf swelling/ttp.  SKIN: No rashes or lesions.

## 2025-02-23 NOTE — ED CDU PROVIDER DISPOSITION NOTE - CARE PROVIDER_API CALL
Hoenig, David Mayer  Urology  [address] [facility]- [place] [address]  Phone: [phone]  Fax: [phone]  Follow Up Time: 1-3 Days    Lois Morales  NP in Adult Health  [address] [place] [id number]  Phone: [phone]  Fax: [phone]  Follow Up Time: 1-3 Days

## 2025-02-23 NOTE — CONSULT NOTE ADULT - SUBJECTIVE AND OBJECTIVE BOX
[facility]   MD LASHON GARCIA MD ANGELA WONG, [place] QIAN CHEN, NP      TEL:  OFFICE: [phone]  From 5pm-7am answering service [phone]    --- INITIAL RENAL CONSULT NOTE ---date of service 02-23-25 @ 14:06    HPI:  64-year-old male history of BPH, CKD, obstructive uropathy, multiple prior urinary retention episodes requiring Silva, presents due to continued urinary retention.  Patient was recently admitted for urinary retention and new YG on CKD with creatinine of 6.17 that improved to 4.75 upon discharge.  Patient unsure of his baseline creatinine but per records has been in the 4-5 range.  Patient was advised to be discharged with a Silva but refused.  Had minimal urination since and started to develop suprapubic pain as well as headaches.  Denies fevers, chest pain, shortness of breath, nausea, vomiting, diarrhea, constipation.       Allergies:  No Known Allergies      PAST MEDICAL & SURGICAL HISTORY:  Acute kidney injury superimposed on CKD    Obstructive uropathy    Lower extremity edema    COVID-19 vaccination refused    Neoplasm of unspecified behavior of bladder    Retention of urine, unspecified    Refusal of blood product    DVT, lower extremity    No significant past surgical history        Home Medications Reviewed    Hospital Medications:   MEDICATIONS  (STANDING):  polyethylene glycol 3350 17 Gram(s) Oral daily  sodium bicarbonate 1300 milliGRAM(s) Oral three times a day  tamsulosin 0.4 milliGRAM(s) Oral at bedtime      SOCIAL HISTORY:  Denies ETOh, Smoking,     FAMILY HISTORY:      REVIEW OF SYSTEMS:  CONSTITUTIONAL: No weakness, fevers or chills  EYES/ENT: No visual changes;  No vertigo or throat pain   NECK: No pain or stiffness  RESPIRATORY: No cough, wheezing, hemoptysis; No shortness of breath  CARDIOVASCULAR: No chest pain or palpitations.  GASTROINTESTINAL: No abdominal or epigastric pain. No nausea, vomiting, or hematemesis; No diarrhea or constipation. No melena or hematochezia.  GENITOURINARY: Per HPI  NEUROLOGICAL: No numbness or weakness  SKIN: No itching, burning, rashes, or lesions   VASCULAR: No bilateral lower extremity edema.   All other review of systems is negative unless indicated above.    VITALS:  T(F): 97.5 (02-23-25 @ 12:30), Max: 98.3 (02-23-25 @ 00:26)  HR: 83 (02-23-25 @ 12:30)  BP: 122/70 (02-23-25 @ 12:30)  RR: 16 (02-23-25 @ 12:30)  SpO2: 99% (02-23-25 @ 12:30)  Wt(kg): --    02-22 @ 07:01  -  02-23 @ 07:00  --------------------------------------------------------  IN: 0 mL / OUT: 1700 mL / NET: -1700 mL    02-23 @ 07:01  -  02-23 @ 14:06  --------------------------------------------------------  IN: 0 mL / OUT: 1100 mL / NET: -1100 mL      Height (cm): 182.9 (02-22 @ 15:16)  Weight (kg): 70.3 (02-22 @ 15:16)  BMI (kg/m2): 21 (02-22 @ 15:16)  BSA (m2): 1.91 (02-22 @ 15:16)    PHYSICAL EXAM:  General: NAD  HEENT: anicteric sclera, oropharynx clear, MMM  Neck: No JVD  Respiratory: CTAB, no wheezes, rales or rhonchi  Cardiovascular: S1, S2, RRR  Gastrointestinal: BS+, soft, NT/ND  Extremities: No cyanosis or clubbing. No peripheral edema  Neurological: A/O x 3, no focal deficits  Psychiatric: Normal mood, normal affect  : No CVA tenderness. +silva.   Skin: No rashes      LABS:  02-23    141  |  109[H]  |  67[H]  ----------------------------<  146[H]  4.6   |  19[L]  |  4.40[H]    Ca    8.9      23 Feb 2025 06:26    TPro  6.7  /  Alb  3.6  /  TBili  0.2  /  DBili      /  AST  <5[L]  /  ALT  15  /  AlkPhos  69  02-22    Creatinine Trend: 4.40 <--, 4.76 <--, 4.99 <--, 4.75 <--, 4.81 <--, 5.05 <--, 5.69 <--, 6.10 <--, 6.17 <--                        9.4    6.00  )-----------( 155      ( 22 Feb 2025 16:28 )             29.6     Urine Studies:  Urinalysis Basic - ( 23 Feb 2025 06:26 )    Color:  / Appearance:  / SG:  / pH:   Gluc: 146 mg/dL / Ketone:   / Bili:  / Urobili:    Blood:  / Protein:  / Nitrite:    Leuk Esterase:  / RBC:  / WBC    Sq Epi:  / Non Sq Epi:  / Bacteria:       Sodium, Random Urine: 112 mmol/L (02-17 @ 23:53)  Creatinine, Random Urine: 30 mg/dL (02-17 @ 23:53)  Protein/Creatinine Ratio Calculation: 58.6 Ratio (02-17 @ 23:53)  Osmolality, Random Urine: 309 mos/kg (02-17 @ 23:53)  Potassium, Random Urine: 12 mmol/L (02-17 @ 23:53)      RADIOLOGY & ADDITIONAL STUDIES:

## 2025-02-23 NOTE — CONSULT NOTE ADULT - ASSESSMENT
64-year-old male history of BPH, CKD, obstructive uropathy, multiple prior urinary retention episodes requiring Silva, presents due to continued urinary retention.  Patient was recently admitted for urinary retention and new YG on CKD with creatinine of 6.17 that improved to 4.75 upon discharge.  Patient unsure of his baseline creatinine but per records has been in the 4-5 range.  Patient was advised to be discharged with a Silva but refused.  Had minimal urination since and started to develop suprapubic pain as well as headaches.  Denies fevers, chest pain, shortness of breath, nausea, vomiting, diarrhea, constipation.  Nephrology consulted for CKD.      A/P  CKD 5:  Baseline S.Cr. unknown; possibly fluctuating between 4-5.  Prev. admissions w/ S.Cr. 4.5-5.25 but also improving to 2.41 in 07/2024.  Pt admitted w/ S.Cr. 6.17 and d/c'd w/ S.Cr. 4.75.  He was advised by team to be d/c'd w/ indwelling silva catheter; however he refused.  Upon return, S.Cr. worsened to 4.99.  Silva again placed for urinary retention.  S.Cr. improving today.  He f/u w/ PCP for monitoring of renal function.  Advised pt to f/u w/ us upon d/c.  He does not wish to be prepared for dialysis, no indication of RRT at this time.  D/C on oral bicarb 1300mg TID   Monitor BMP and UO.    Acidosis:  In setting of retention and RF.  C/w oral bicarb 1300mg TID.  + silva.  Monitor CO2.    Anemia:  Check iron studies.  Monitor Hgb.    CKD - MBD:  Check PTH.  Monitor PO4 and Ca daily.    Obstructive uropathy  Urology following.  + silva.  Outpatient f/u for cystoscopy and urodynamics study (UDS).     Low Dose Naltrexone Counseling- I discussed with the patient the potential risks and side effects of low dose naltrexone including but not limited to: more vivid dreams, headaches, nausea, vomiting, abdominal pain, fatigue, dizziness, and anxiety.

## 2025-02-23 NOTE — ED CDU PROVIDER SUBSEQUENT DAY NOTE - ATTENDING APP SHARED VISIT CONTRIBUTION OF CARE
This was a shared visit with UZMA.  I have reviewed and discussed the case with the UZMA and agree with verified documentation unless otherwise documented.  I have independently spoken with and examined the patient and my documentation of history/pe and MDM are above.

## 2025-02-23 NOTE — ED CDU PROVIDER DISPOSITION NOTE - PROVIDER TOKENS
PROVIDER:[TOKEN:[[id number]],FOLLOWUP:[1-3 Days]],PROVIDER:[TOKEN:[[id number]],FOLLOWUP:[1-3 Days]]

## 2025-02-23 NOTE — ED ADULT NURSE REASSESSMENT NOTE - NS ED NURSE REASSESS COMMENT FT1
Pt received from NEYDA Menchaca. Pt oriented to CDU & plan of care was discussed. Pt A&O x 4. Pt in CDU for BMP q 6, PO hydration, monitor urinary output. Pink tinged urine draining from Partt. Pt denies any chest pain, SOB, dizziness or palpitations. V/S stable, pt afebrile,  IV in place, patent and free of signs of infiltration. Pt resting in bed. Safety & comfort measures maintained. Call bell in reach. Will continue to monitor.

## 2025-02-24 LAB
CULTURE RESULTS: NO GROWTH — SIGNIFICANT CHANGE UP
SPECIMEN SOURCE: SIGNIFICANT CHANGE UP

## 2025-03-03 ENCOUNTER — APPOINTMENT (OUTPATIENT)
Dept: INTERNAL MEDICINE | Facility: CLINIC | Age: 65
End: 2025-03-03
Payer: MEDICAID

## 2025-03-03 VITALS
OXYGEN SATURATION: 97 % | HEIGHT: 72 IN | BODY MASS INDEX: 21.4 KG/M2 | HEART RATE: 84 BPM | TEMPERATURE: 98.2 F | SYSTOLIC BLOOD PRESSURE: 117 MMHG | DIASTOLIC BLOOD PRESSURE: 74 MMHG | WEIGHT: 158 LBS

## 2025-03-03 DIAGNOSIS — N17.9 ACUTE KIDNEY FAILURE, UNSPECIFIED: ICD-10-CM

## 2025-03-03 DIAGNOSIS — L29.9 PRURITUS, UNSPECIFIED: ICD-10-CM

## 2025-03-03 PROCEDURE — 99496 TRANSJ CARE MGMT HIGH F2F 7D: CPT

## 2025-03-05 ENCOUNTER — APPOINTMENT (OUTPATIENT)
Dept: UROLOGY | Facility: CLINIC | Age: 65
End: 2025-03-05
Payer: MEDICAID

## 2025-03-05 ENCOUNTER — OUTPATIENT (OUTPATIENT)
Dept: OUTPATIENT SERVICES | Facility: HOSPITAL | Age: 65
LOS: 1 days | End: 2025-03-05
Payer: MEDICAID

## 2025-03-05 VITALS
SYSTOLIC BLOOD PRESSURE: 131 MMHG | DIASTOLIC BLOOD PRESSURE: 77 MMHG | HEART RATE: 78 BPM | TEMPERATURE: 98 F | RESPIRATION RATE: 16 BRPM | OXYGEN SATURATION: 100 %

## 2025-03-05 DIAGNOSIS — Z09 ENCOUNTER FOR FOLLOW-UP EXAMINATION AFTER COMPLETED TREATMENT FOR CONDITIONS OTHER THAN MALIGNANT NEOPLASM: ICD-10-CM

## 2025-03-05 DIAGNOSIS — N40.1 BENIGN PROSTATIC HYPERPLASIA WITH LOWER URINARY TRACT SYMPMS: ICD-10-CM

## 2025-03-05 DIAGNOSIS — R35.0 FREQUENCY OF MICTURITION: ICD-10-CM

## 2025-03-05 DIAGNOSIS — N13.8 BENIGN PROSTATIC HYPERPLASIA WITH LOWER URINARY TRACT SYMPMS: ICD-10-CM

## 2025-03-05 DIAGNOSIS — N18.9 CHRONIC KIDNEY DISEASE, UNSPECIFIED: ICD-10-CM

## 2025-03-05 PROCEDURE — 51728 CYSTOMETROGRAM W/VP: CPT | Mod: 26

## 2025-03-05 PROCEDURE — 52000 CYSTOURETHROSCOPY: CPT

## 2025-03-05 PROCEDURE — 99213 OFFICE O/P EST LOW 20 MIN: CPT | Mod: 25

## 2025-03-05 PROCEDURE — 51728 CYSTOMETROGRAM W/VP: CPT

## 2025-03-08 PROBLEM — Z09 HOSPITAL DISCHARGE FOLLOW-UP: Status: ACTIVE | Noted: 2025-03-03

## 2025-03-10 DIAGNOSIS — Z09 ENCOUNTER FOR FOLLOW-UP EXAMINATION AFTER COMPLETED TREATMENT FOR CONDITIONS OTHER THAN MALIGNANT NEOPLASM: ICD-10-CM

## 2025-03-10 DIAGNOSIS — R33.9 RETENTION OF URINE, UNSPECIFIED: ICD-10-CM

## 2025-03-10 DIAGNOSIS — N40.1 BENIGN PROSTATIC HYPERPLASIA WITH LOWER URINARY TRACT SYMPTOMS: ICD-10-CM

## 2025-03-12 ENCOUNTER — APPOINTMENT (OUTPATIENT)
Dept: INTERNAL MEDICINE | Facility: CLINIC | Age: 65
End: 2025-03-12
Payer: MEDICAID

## 2025-03-12 VITALS
HEART RATE: 93 BPM | TEMPERATURE: 99.2 F | OXYGEN SATURATION: 100 % | DIASTOLIC BLOOD PRESSURE: 74 MMHG | HEIGHT: 72 IN | SYSTOLIC BLOOD PRESSURE: 108 MMHG

## 2025-03-12 DIAGNOSIS — R33.9 RETENTION OF URINE, UNSPECIFIED: ICD-10-CM

## 2025-03-12 PROCEDURE — 99213 OFFICE O/P EST LOW 20 MIN: CPT

## 2025-03-12 PROCEDURE — G2211 COMPLEX E/M VISIT ADD ON: CPT | Mod: NC

## 2025-03-18 ENCOUNTER — APPOINTMENT (OUTPATIENT)
Dept: NEPHROLOGY | Facility: CLINIC | Age: 65
End: 2025-03-18
